# Patient Record
Sex: FEMALE | Race: WHITE | Employment: OTHER | URBAN - METROPOLITAN AREA
[De-identification: names, ages, dates, MRNs, and addresses within clinical notes are randomized per-mention and may not be internally consistent; named-entity substitution may affect disease eponyms.]

---

## 2018-03-02 ENCOUNTER — HOSPITAL ENCOUNTER (OUTPATIENT)
Dept: CT IMAGING | Age: 73
Discharge: HOME | End: 2018-03-02
Attending: INTERNAL MEDICINE
Payer: MEDICARE

## 2018-03-02 DIAGNOSIS — R19.05 PERIUMBILICAL MASS: ICD-10-CM

## 2018-03-02 PROCEDURE — 74177 CT ABD & PELVIS W/CONTRAST: CPT

## 2018-03-02 PROCEDURE — 74011636320 HC RX REV CODE- 636/320

## 2018-03-02 PROCEDURE — 74011000258 HC RX REV CODE- 258

## 2018-03-02 RX ORDER — SODIUM CHLORIDE 0.9 % (FLUSH) 0.9 %
10 SYRINGE (ML) INJECTION
Status: COMPLETED | OUTPATIENT
Start: 2018-03-02 | End: 2018-03-02

## 2018-03-02 RX ADMIN — Medication 10 ML: at 11:25

## 2018-03-02 RX ADMIN — IOPAMIDOL 100 ML: 755 INJECTION, SOLUTION INTRAVENOUS at 11:25

## 2018-03-02 RX ADMIN — DIATRIZOATE MEGLUMINE AND DIATRIZOATE SODIUM 15 ML: 660; 100 LIQUID ORAL; RECTAL at 11:25

## 2018-03-02 RX ADMIN — SODIUM CHLORIDE 100 ML: 900 INJECTION, SOLUTION INTRAVENOUS at 11:25

## 2018-03-08 PROBLEM — E66.01 OBESITY, MORBID (HCC): Status: ACTIVE | Noted: 2018-03-08

## 2018-03-09 ENCOUNTER — HOSPITAL ENCOUNTER (EMERGENCY)
Age: 73
Discharge: HOME | End: 2018-03-09
Attending: EMERGENCY MEDICINE
Payer: MEDICARE

## 2018-03-09 VITALS
RESPIRATION RATE: 18 BRPM | DIASTOLIC BLOOD PRESSURE: 82 MMHG | OXYGEN SATURATION: 98 % | BODY MASS INDEX: 45.64 KG/M2 | SYSTOLIC BLOOD PRESSURE: 148 MMHG | HEART RATE: 91 BPM | HEIGHT: 66 IN | TEMPERATURE: 98.9 F | WEIGHT: 284 LBS

## 2018-03-09 DIAGNOSIS — R10.9 ACUTE ABDOMINAL PAIN: Primary | ICD-10-CM

## 2018-03-09 DIAGNOSIS — K43.2 INCISIONAL HERNIA, WITHOUT OBSTRUCTION OR GANGRENE: ICD-10-CM

## 2018-03-09 DIAGNOSIS — K59.00 CONSTIPATION, UNSPECIFIED CONSTIPATION TYPE: ICD-10-CM

## 2018-03-09 LAB
BACTERIA URNS QL MICRO: NORMAL /HPF
CASTS URNS QL MICRO: 0 /LPF
EPI CELLS #/AREA URNS HPF: NORMAL /HPF
RBC #/AREA URNS HPF: 0 /HPF
WBC URNS QL MICRO: NORMAL /HPF

## 2018-03-09 PROCEDURE — 87086 URINE CULTURE/COLONY COUNT: CPT | Performed by: EMERGENCY MEDICINE

## 2018-03-09 PROCEDURE — 81015 MICROSCOPIC EXAM OF URINE: CPT | Performed by: EMERGENCY MEDICINE

## 2018-03-09 PROCEDURE — 87186 SC STD MICRODIL/AGAR DIL: CPT | Performed by: EMERGENCY MEDICINE

## 2018-03-09 PROCEDURE — 99282 EMERGENCY DEPT VISIT SF MDM: CPT | Performed by: EMERGENCY MEDICINE

## 2018-03-09 PROCEDURE — 87088 URINE BACTERIA CULTURE: CPT | Performed by: EMERGENCY MEDICINE

## 2018-03-09 NOTE — ED TRIAGE NOTES
Pt presents to ER for known ventral hernia that is having mild constipation and poss hemorrhoid stating that she feels like \"something feels like it's gonna fall out back there\".

## 2018-03-09 NOTE — ED NOTES
I have reviewed discharge instructions with the patient. The patient verbalized understanding. Patient left ED via Discharge Method: ambulatory to Home with (daughter). Opportunity for questions and clarification provided. Patient given 0 scripts. To continue your aftercare when you leave the hospital, you may receive an automated call from our care team to check in on how you are doing. This is a free service and part of our promise to provide the best care and service to meet your aftercare needs.  If you have questions, or wish to unsubscribe from this service please call [phone]. Thank you for Choosing our [facility] Emergency Department.

## 2018-03-09 NOTE — ED PROVIDER NOTES
HPI Comments: 43-year-old females has troubles with upper umbilical pain for a while. She had a CT scan showing a umbilical hernia. The series remains sore. Tonight about 9 PM she noticed nausea along with some slight increased soreness in her abdomen. She also has some low back pain  And a feeling of fullness in her rectum. No vomiting or diarrhea. No bleeding no change in urine. Patient is a 71 y.o. female presenting with rectal pain and nausea. The history is provided by the patient. Anal Pain    The current episode started today. The problem occurs rarely. The problem has been unchanged. The pain is mild. The stool is described as hard. Associated symptoms include abdominal pain, nausea and rectal pain. Pertinent negatives include no fever, no diarrhea, no hemorrhoids, no vomiting, no chest pain, no headaches, no coughing and no rash. There were no sick contacts. Nausea    Associated symptoms include abdominal pain. Pertinent negatives include no chills, no fever, no diarrhea, no headaches, no cough and no headaches.         Past Medical History:   Diagnosis Date    Arthritis     osteo    Cataracts, bilateral     Chronic pain     right shoulder x 2 years    Edema 2/26/2013    GERD (gastroesophageal reflux disease)     controlled with medication    Gout 2/26/2013    Hyperglycemia 8/31/2015    Hyperlipidemia 2/26/2013    Hypertension     controlled with meds    IBS (irritable bowel syndrome)     Ill-defined condition     hgba1c was 5.4 on 03/2016    Morbid obesity ([address]     bmi=47.2    Other ill-defined conditions(799.89)     back problems    Prediabetes 6/22/2016    Sciatica     right worse than left    Ulcer of ankle ([address] 2/26/2013    Vitamin D deficiency 2/19/2015       Past Surgical History:   Procedure Laterality Date    HX APPENDECTOMY  1954    HX CATARACT REMOVAL Bilateral     2014 - Dr. Nila Coy Bilateral 2016    Dr. Frank Candelario 1964    right inguinal hernia    HX HERNIA REPAIR  1993, 8183, 7679    umbilical hernia repair    HX HYSTERECTOMY  1986    HX LAP CHOLECYSTECTOMY  1994    HX MOHS PROCEDURES  2011    left    HX ORTHOPAEDIC  1982  2002    right elbow surgery    HX SHOULDER REPLACEMENT Right     HX TONSILLECTOMY      TOTAL KNEE ARTHROPLASTY  2005    right    TOTAL KNEE ARTHROPLASTY  2012    left         Family History:   Problem Relation Age of Onset   [facility] Cancer Mother      colon    Hypertension Mother     Stroke Mother     Heart Disease Mother      CHF and a Pacemaker    Hypertension Father     Cancer Father      prostate and colon    Cancer Maternal Grandmother      colon    Breast Cancer Neg Hx        Social History     Social History    Marital status:      Spouse name: N/A    Number of children: N/A    Years of education: N/A     Occupational History    Not on file. Social History Main Topics    Smoking status: Never Smoker    Smokeless tobacco: Never Used    Alcohol use No    Drug use: No    Sexual activity: Not Currently     Other Topics Concern    Not on file     Social History Narrative         ALLERGIES: Adhesive; Clindamycin; and Keflex [cephalexin]    Review of Systems   Constitutional: Negative for chills and fever. Respiratory: Negative for cough and shortness of breath. Cardiovascular: Negative for chest pain and palpitations. Gastrointestinal: Positive for abdominal pain, nausea and rectal pain. Negative for diarrhea, hemorrhoids and vomiting. Genitourinary: Negative for dysuria and flank pain. Musculoskeletal: Negative for back pain and neck pain. Skin: Negative for color change and rash. Neurological: Negative for syncope and headaches. All other systems reviewed and are negative. There were no vitals filed for this visit. Physical Exam   Constitutional: She is oriented to person, place, and time. She appears well-developed and well-nourished.  No distress. HENT:   Head: Normocephalic and atraumatic. Mouth/Throat: Oropharynx is clear and moist. No oropharyngeal exudate. Eyes: Conjunctivae and EOM are normal. Pupils are equal, round, and reactive to light. Neck: Normal range of motion. Neck supple. Cardiovascular: Normal rate, regular rhythm and intact distal pulses. No murmur heard. Pulmonary/Chest: Breath sounds normal. No respiratory distress. Abdominal: Soft. Bowel sounds are normal. She exhibits no mass. There is no tenderness. There is no rebound and no guarding. A hernia is present. Genitourinary: Rectal exam shows no external hemorrhoid, no fissure, no mass, no tenderness and guaiac negative stool. Genitourinary Comments: No impaction or blood or prolapse   Neurological: She is alert and oriented to person, place, and time. Gait normal.   Nl speech   Skin: Skin is warm and dry. Psychiatric: She has a normal mood and affect. Her speech is normal.   Nursing note and vitals reviewed. MDM  Number of Diagnoses or Management Options        ED Course       Procedures    Results Include:    Recent Results (from the past 24 hour(s))   URINE MICROSCOPIC    Collection Time: 03/09/18  5:08 AM   Result Value Ref Range    WBC 5-10 0 /hpf    RBC 0 0 /hpf    Epithelial cells 0-3 0 /hpf    Bacteria TRACE 0 /hpf    Casts 0 0 /lpf        No obvious UTI. We'll culture urine. Do not think ventral hernia is causing any new symptoms. No evidence for incarceration or strangulation. No obvious rectal prolapse or bleeding hemorrhoids. Patient had 2 small hard bowel movements earlier today.

## 2018-03-09 NOTE — DISCHARGE INSTRUCTIONS
Milk of magnesia to get your bowels to move. Consider MiraLAX. Keep appointment with his surgeon next week. Recheck sooner for worse pain fever or vomiting. Abdominal Pain: Care Instructions  Your Care Instructions    Abdominal pain has many possible causes. Some aren't serious and get better on their own in a few days. Others need more testing and treatment. If your pain continues or gets worse, you need to be rechecked and may need more tests to find out what is wrong. You may need surgery to correct the problem. Don't ignore new symptoms, such as fever, nausea and vomiting, urination problems, pain that gets worse, and dizziness. These may be signs of a more serious problem. Your doctor may have recommended a follow-up visit in the next 8 to 12 hours. If you are not getting better, you may need more tests or treatment. The doctor has checked you carefully, but problems can develop later. If you notice any problems or new symptoms, get medical treatment right away. Follow-up care is a key part of your treatment and safety. Be sure to make and go to all appointments, and call your doctor if you are having problems. It's also a good idea to know your test results and keep a list of the medicines you take. How can you care for yourself at home? · Rest until you feel better. · To prevent dehydration, drink plenty of fluids, enough so that your urine is light yellow or clear like water. Choose water and other caffeine-free clear liquids until you feel better. If you have kidney, heart, or liver disease and have to limit fluids, talk with your doctor before you increase the amount of fluids you drink. · If your stomach is upset, eat mild foods, such as rice, dry toast or crackers, bananas, and applesauce. Try eating several small meals instead of two or three large ones.   · Wait until 48 hours after all symptoms have gone away before you have spicy foods, alcohol, and drinks that contain caffeine. · Do not eat foods that are high in fat. · Avoid anti-inflammatory medicines such as aspirin, ibuprofen (Advil, Motrin), and naproxen (Aleve). These can cause stomach upset. Talk to your doctor if you take daily aspirin for another health problem. When should you call for help? Call 911 anytime you think you may need emergency care. For example, call if:  ? · You passed out (lost consciousness). ? · You pass maroon or very bloody stools. ? · You vomit blood or what looks like coffee grounds. ? · You have new, severe belly pain. ?Call your doctor now or seek immediate medical care if:  ? · Your pain gets worse, especially if it becomes focused in one area of your belly. ? · You have a new or higher fever. ? · Your stools are black and look like tar, or they have streaks of blood. ? · You have unexpected vaginal bleeding. ? · You have symptoms of a urinary tract infection. These may include:  ¨ Pain when you urinate. ¨ Urinating more often than usual.  ¨ Blood in your urine. ? · You are dizzy or lightheaded, or you feel like you may faint. ? Watch closely for changes in your health, and be sure to contact your doctor if:  ? · You are not getting better after 1 day (24 hours). Where can you learn more? Go to http://dana-justo.info/. Enter F664 in the search box to learn more about \"Abdominal Pain: Care Instructions. \"  Current as of: March 20, 2017  Content Version: 11.4  © 8526-0301 [employer]. Care instructions adapted under license by [employer] (which disclaims liability or warranty for this information). If you have questions about a medical condition or this instruction, always ask your healthcare professional. [address] any warranty or liability for your use of this information.

## 2018-03-10 RX ORDER — NITROFURANTOIN 25; 75 MG/1; MG/1
100 CAPSULE ORAL 2 TIMES DAILY
Qty: 20 CAP | Refills: 0 | Status: SHIPPED | OUTPATIENT
Start: 2018-03-10 | End: 2018-03-20

## 2018-03-10 RX ORDER — FLUCONAZOLE 150 MG/1
150 TABLET ORAL
Qty: 2 TAB | Refills: 0 | Status: SHIPPED | OUTPATIENT
Start: 2018-03-10 | End: 2018-03-10

## 2018-03-10 NOTE — PROGRESS NOTES
Patient returned call, she provided  and last four digits of SS# for ID. She uses [facility] at [facility], I will e-scribe macrobid and diflucan as patient states \"I usually get a yeast infection on antibiotics. \" Patient is not having any new symptoms.

## 2018-03-10 NOTE — PROGRESS NOTES
Patient not placed on antibiotics. Called and left a VM for patient to return call. She will need some antibiotics.

## 2018-03-12 LAB
BACTERIA SPEC CULT: ABNORMAL
SERVICE CMNT-IMP: ABNORMAL

## 2018-03-22 ENCOUNTER — HOSPITAL ENCOUNTER (OUTPATIENT)
Dept: SURGERY | Age: 73
Discharge: HOME | End: 2018-03-22

## 2018-03-22 VITALS
HEIGHT: 65 IN | OXYGEN SATURATION: 99 % | DIASTOLIC BLOOD PRESSURE: 73 MMHG | SYSTOLIC BLOOD PRESSURE: 166 MMHG | RESPIRATION RATE: 20 BRPM | TEMPERATURE: 98.1 F | HEART RATE: 66 BPM | WEIGHT: 287 LBS | BODY MASS INDEX: 47.82 KG/M2

## 2018-03-22 RX ORDER — NAPROXEN SODIUM 220 MG
220 TABLET ORAL
COMMUNITY
End: 2018-10-16 | Stop reason: ALTCHOICE

## 2018-03-22 NOTE — PERIOP NOTES
Type 2 surgery,  assessment complete. Labs per surgeon: none  Labs per anesthesia protocol: hgb, potassium and creatinine- results 3/1/18 reviewed and approved  EKG: not required  ECHO- Pt states echo preformed at [facility] yesterday 3/21/18 for feeling SOB while lying flat only. Pt denies SOB on exertion. S1S2, no audible murmur. Spoke with Raysa at [facility] and she states that results should be available tomorrow. Chart marked for charge nurse to obtain and review results. Hibiclens and instructions given per hospital policy. Patient provided with and instructed on educational handouts including Guide to Surgery, Pain Management, Hand Hygiene, Blood Transfusion Education, and [facility] Brochure. Patient answered medical/surgical history questions at their best of ability. All prior to admission medications documented in [facility]. Patient instructed to hold all vitamins 7 days prior to surgery and NSAIDS 5 days prior to surgery, patient verbalized understanding. Medications to be held: none, however Dr Karyle Belton has advised pt to hold aspirin 81 mg for surgery. Pt denies hx of CAD, stents, stroke,arrhythmia, or DVT. Patient instructed to continue previous medications as prescribed prior to surgery and to take the following medications the day of surgery according to anesthesia guidelines with a small sip of water: allopurinol and omeprazole. Patient instructed on the following:  Arrive at MAIN Entrance, time of arrival to be called the day before by 1700  NPO after midnight including gum, mints, and ice chips. Responsible adult must drive patient to the hospital, stay during surgery, and patient will require supervision 24 hours after anesthesia. Use hibiclens in shower the night before surgery and on the morning of surgery. Leave all valuables (money and jewelry) at home but bring insurance card and ID on DOS.   Do not wear make-up, nail polish, lotions, cologne, perfumes, powders, or oil on skin. Patient teach back successful and patient demonstrates knowledge of instruction.

## 2018-03-23 NOTE — PERIOP NOTES
Spoke with Teddy López at [address] [place] [address] [address] [facility] regarding echo results from yesterday. Echo results will be read tonight. Will call Monday to follow-up.

## 2018-03-27 NOTE — PERIOP NOTES
Notes Recorded by Albania Engle MD on 3/23/2018 at 3:15 PM  Your echo looks good!  No sign of reduced heart function or abnormal lung pressures to cause the shortness of breath.  I'm wondering whether weight contributes and would like you to try sleeping propped up routinely, for ease of breathing    Reviewed

## 2018-03-29 ENCOUNTER — ANESTHESIA (OUTPATIENT)
Dept: SURGERY | Age: 73
DRG: 354 | End: 2018-03-29
Payer: MEDICARE

## 2018-03-29 ENCOUNTER — ANESTHESIA EVENT (OUTPATIENT)
Dept: SURGERY | Age: 73
DRG: 354 | End: 2018-03-29
Payer: MEDICARE

## 2018-03-29 ENCOUNTER — HOSPITAL ENCOUNTER (INPATIENT)
Age: 73
LOS: 2 days | Discharge: HOME | DRG: 354 | End: 2018-03-31
Attending: SURGERY | Admitting: SURGERY
Payer: MEDICARE

## 2018-03-29 DIAGNOSIS — K43.2 INCISIONAL HERNIA WITHOUT OBSTRUCTION OR GANGRENE: Primary | ICD-10-CM

## 2018-03-29 PROBLEM — K43.0 RECURRENT INCISIONAL HERNIA WITH INCARCERATION: Status: ACTIVE | Noted: 2018-03-29

## 2018-03-29 LAB
GLUCOSE BLD STRIP.AUTO-MCNC: 127 MG/DL (ref 65–100)
POTASSIUM BLD-SCNC: 4 MMOL/L (ref 3.5–5.1)

## 2018-03-29 PROCEDURE — 0WPF0JZ REMOVAL OF SYNTHETIC SUBSTITUTE FROM ABDOMINAL WALL, OPEN APPROACH: ICD-10-PCS | Performed by: SURGERY

## 2018-03-29 PROCEDURE — 84132 ASSAY OF SERUM POTASSIUM: CPT

## 2018-03-29 PROCEDURE — 77030008703 HC TU ET UNCUF COVD -A: Performed by: ANESTHESIOLOGY

## 2018-03-29 PROCEDURE — 77030012411 HC DRN WND CARD -A: Performed by: SURGERY

## 2018-03-29 PROCEDURE — 74011250636 HC RX REV CODE- 250/636

## 2018-03-29 PROCEDURE — 65270000029 HC RM PRIVATE

## 2018-03-29 PROCEDURE — 76010000162 HC OR TIME 1.5 TO 2 HR INTENSV-TIER 1: Performed by: SURGERY

## 2018-03-29 PROCEDURE — 77030018836 HC SOL IRR NACL ICUM -A: Performed by: SURGERY

## 2018-03-29 PROCEDURE — 77030032490 HC SLV COMPR SCD KNE COVD -B: Performed by: SURGERY

## 2018-03-29 PROCEDURE — 77030008477 HC STYL SATN SLP COVD -A: Performed by: ANESTHESIOLOGY

## 2018-03-29 PROCEDURE — 74011000250 HC RX REV CODE- 250

## 2018-03-29 PROCEDURE — 77030013567 HC DRN WND RESERV BARD -A: Performed by: SURGERY

## 2018-03-29 PROCEDURE — C1713 ANCHOR/SCREW BN/BN,TIS/BN: HCPCS | Performed by: SURGERY

## 2018-03-29 PROCEDURE — 74011250636 HC RX REV CODE- 250/636: Performed by: SURGERY

## 2018-03-29 PROCEDURE — 77030012406 HC DRN WND PENRS BARD -A: Performed by: SURGERY

## 2018-03-29 PROCEDURE — 74011250637 HC RX REV CODE- 250/637: Performed by: SURGERY

## 2018-03-29 PROCEDURE — 77030005326 HC CATH PAIN PMP/Q AVNM -C: Performed by: SURGERY

## 2018-03-29 PROCEDURE — 77030020782 HC GWN BAIR PAWS FLX 3M -B: Performed by: ANESTHESIOLOGY

## 2018-03-29 PROCEDURE — 82962 GLUCOSE BLOOD TEST: CPT

## 2018-03-29 PROCEDURE — 74011250636 HC RX REV CODE- 250/636: Performed by: ANESTHESIOLOGY

## 2018-03-29 PROCEDURE — 76210000006 HC OR PH I REC 0.5 TO 1 HR: Performed by: SURGERY

## 2018-03-29 PROCEDURE — 74011000250 HC RX REV CODE- 250: Performed by: SURGERY

## 2018-03-29 PROCEDURE — 0JH80VZ INSERTION OF INFUSION PUMP INTO ABDOMEN SUBCUTANEOUS TISSUE AND FASCIA, OPEN APPROACH: ICD-10-PCS | Performed by: SURGERY

## 2018-03-29 PROCEDURE — 77030011640 HC PAD GRND REM COVD -A: Performed by: SURGERY

## 2018-03-29 PROCEDURE — 77030008467 HC STPLR SKN COVD -B: Performed by: SURGERY

## 2018-03-29 PROCEDURE — 76060000034 HC ANESTHESIA 1.5 TO 2 HR: Performed by: SURGERY

## 2018-03-29 PROCEDURE — 77030002986 HC SUT PROL J&J -A: Performed by: SURGERY

## 2018-03-29 PROCEDURE — 0WUF0JZ SUPPLEMENT ABDOMINAL WALL WITH SYNTHETIC SUBSTITUTE, OPEN APPROACH: ICD-10-PCS | Performed by: SURGERY

## 2018-03-29 PROCEDURE — C1781 MESH (IMPLANTABLE): HCPCS | Performed by: SURGERY

## 2018-03-29 PROCEDURE — 77030002996 HC SUT SLK J&J -A: Performed by: SURGERY

## 2018-03-29 PROCEDURE — 77030002916 HC SUT ETHLN J&J -A: Performed by: SURGERY

## 2018-03-29 DEVICE — MESH HERN L W5.4XL7IN POLYPR EPTFE OVL SELF EXP PTCH FOR: Type: IMPLANTABLE DEVICE | Site: ABDOMEN | Status: FUNCTIONAL

## 2018-03-29 RX ORDER — DEXAMETHASONE SODIUM PHOSPHATE 4 MG/ML
INJECTION, SOLUTION INTRA-ARTICULAR; INTRALESIONAL; INTRAMUSCULAR; INTRAVENOUS; SOFT TISSUE AS NEEDED
Status: DISCONTINUED | OUTPATIENT
Start: 2018-03-29 | End: 2018-03-29 | Stop reason: HOSPADM

## 2018-03-29 RX ORDER — NALOXONE HYDROCHLORIDE 0.4 MG/ML
0.1 INJECTION, SOLUTION INTRAMUSCULAR; INTRAVENOUS; SUBCUTANEOUS AS NEEDED
Status: DISCONTINUED | OUTPATIENT
Start: 2018-03-29 | End: 2018-03-29 | Stop reason: HOSPADM

## 2018-03-29 RX ORDER — FENTANYL CITRATE 50 UG/ML
100 INJECTION, SOLUTION INTRAMUSCULAR; INTRAVENOUS AS NEEDED
Status: DISCONTINUED | OUTPATIENT
Start: 2018-03-29 | End: 2018-03-29 | Stop reason: HOSPADM

## 2018-03-29 RX ORDER — ENOXAPARIN SODIUM 100 MG/ML
40 INJECTION SUBCUTANEOUS EVERY 24 HOURS
Status: DISCONTINUED | OUTPATIENT
Start: 2018-03-29 | End: 2018-03-31 | Stop reason: HOSPADM

## 2018-03-29 RX ORDER — ACETAMINOPHEN 500 MG
500 TABLET ORAL ONCE
Status: DISCONTINUED | OUTPATIENT
Start: 2018-03-29 | End: 2018-03-29 | Stop reason: HOSPADM

## 2018-03-29 RX ORDER — SODIUM CHLORIDE 0.9 % (FLUSH) 0.9 %
5-10 SYRINGE (ML) INJECTION EVERY 8 HOURS
Status: DISCONTINUED | OUTPATIENT
Start: 2018-03-29 | End: 2018-03-29 | Stop reason: HOSPADM

## 2018-03-29 RX ORDER — ONDANSETRON 2 MG/ML
4 INJECTION INTRAMUSCULAR; INTRAVENOUS
Status: DISCONTINUED | OUTPATIENT
Start: 2018-03-29 | End: 2018-03-31 | Stop reason: HOSPADM

## 2018-03-29 RX ORDER — SODIUM CHLORIDE, SODIUM LACTATE, POTASSIUM CHLORIDE, CALCIUM CHLORIDE 600; 310; 30; 20 MG/100ML; MG/100ML; MG/100ML; MG/100ML
1000 INJECTION, SOLUTION INTRAVENOUS CONTINUOUS
Status: DISCONTINUED | OUTPATIENT
Start: 2018-03-29 | End: 2018-03-29 | Stop reason: HOSPADM

## 2018-03-29 RX ORDER — HYDROMORPHONE HYDROCHLORIDE 2 MG/ML
.5-2 INJECTION, SOLUTION INTRAMUSCULAR; INTRAVENOUS; SUBCUTANEOUS
Status: DISCONTINUED | OUTPATIENT
Start: 2018-03-29 | End: 2018-03-31 | Stop reason: HOSPADM

## 2018-03-29 RX ORDER — DIPHENHYDRAMINE HYDROCHLORIDE 50 MG/ML
12.5 INJECTION, SOLUTION INTRAMUSCULAR; INTRAVENOUS ONCE
Status: DISCONTINUED | OUTPATIENT
Start: 2018-03-29 | End: 2018-03-29 | Stop reason: HOSPADM

## 2018-03-29 RX ORDER — SODIUM CHLORIDE 0.9 % (FLUSH) 0.9 %
5-10 SYRINGE (ML) INJECTION AS NEEDED
Status: DISCONTINUED | OUTPATIENT
Start: 2018-03-29 | End: 2018-03-29 | Stop reason: HOSPADM

## 2018-03-29 RX ORDER — LIDOCAINE HYDROCHLORIDE 20 MG/ML
INJECTION, SOLUTION EPIDURAL; INFILTRATION; INTRACAUDAL; PERINEURAL AS NEEDED
Status: DISCONTINUED | OUTPATIENT
Start: 2018-03-29 | End: 2018-03-29 | Stop reason: HOSPADM

## 2018-03-29 RX ORDER — OXYCODONE HYDROCHLORIDE 5 MG/1
5-15 TABLET ORAL
Status: DISCONTINUED | OUTPATIENT
Start: 2018-03-29 | End: 2018-03-31 | Stop reason: HOSPADM

## 2018-03-29 RX ORDER — LIDOCAINE HYDROCHLORIDE 10 MG/ML
0.1 INJECTION INFILTRATION; PERINEURAL AS NEEDED
Status: DISCONTINUED | OUTPATIENT
Start: 2018-03-29 | End: 2018-03-29 | Stop reason: HOSPADM

## 2018-03-29 RX ORDER — HYDROCHLOROTHIAZIDE 12.5 MG/1
12.5 CAPSULE ORAL DAILY
Status: DISCONTINUED | OUTPATIENT
Start: 2018-03-30 | End: 2018-03-31 | Stop reason: HOSPADM

## 2018-03-29 RX ORDER — NALOXONE HYDROCHLORIDE 0.4 MG/ML
0.1 INJECTION, SOLUTION INTRAMUSCULAR; INTRAVENOUS; SUBCUTANEOUS AS NEEDED
Status: DISCONTINUED | OUTPATIENT
Start: 2018-03-29 | End: 2018-03-31 | Stop reason: HOSPADM

## 2018-03-29 RX ORDER — OXYCODONE HYDROCHLORIDE 5 MG/1
10 TABLET ORAL
Status: DISCONTINUED | OUTPATIENT
Start: 2018-03-29 | End: 2018-03-29 | Stop reason: HOSPADM

## 2018-03-29 RX ORDER — ROCURONIUM BROMIDE 10 MG/ML
INJECTION, SOLUTION INTRAVENOUS AS NEEDED
Status: DISCONTINUED | OUTPATIENT
Start: 2018-03-29 | End: 2018-03-29 | Stop reason: HOSPADM

## 2018-03-29 RX ORDER — ONDANSETRON 2 MG/ML
INJECTION INTRAMUSCULAR; INTRAVENOUS AS NEEDED
Status: DISCONTINUED | OUTPATIENT
Start: 2018-03-29 | End: 2018-03-29 | Stop reason: HOSPADM

## 2018-03-29 RX ORDER — BUPIVACAINE HYDROCHLORIDE 2.5 MG/ML
INJECTION, SOLUTION EPIDURAL; INFILTRATION; INTRACAUDAL AS NEEDED
Status: DISCONTINUED | OUTPATIENT
Start: 2018-03-29 | End: 2018-03-29 | Stop reason: HOSPADM

## 2018-03-29 RX ORDER — LISINOPRIL 20 MG/1
40 TABLET ORAL DAILY
Status: DISCONTINUED | OUTPATIENT
Start: 2018-03-30 | End: 2018-03-31 | Stop reason: HOSPADM

## 2018-03-29 RX ORDER — MIDAZOLAM HYDROCHLORIDE 1 MG/ML
2 INJECTION, SOLUTION INTRAMUSCULAR; INTRAVENOUS
Status: DISCONTINUED | OUTPATIENT
Start: 2018-03-29 | End: 2018-03-29 | Stop reason: HOSPADM

## 2018-03-29 RX ORDER — SODIUM CHLORIDE 0.9 % (FLUSH) 0.9 %
5-10 SYRINGE (ML) INJECTION AS NEEDED
Status: DISCONTINUED | OUTPATIENT
Start: 2018-03-29 | End: 2018-03-31 | Stop reason: HOSPADM

## 2018-03-29 RX ORDER — ONDANSETRON 2 MG/ML
4 INJECTION INTRAMUSCULAR; INTRAVENOUS ONCE
Status: DISCONTINUED | OUTPATIENT
Start: 2018-03-29 | End: 2018-03-29 | Stop reason: HOSPADM

## 2018-03-29 RX ORDER — ASPIRIN 81 MG/1
81 TABLET ORAL DAILY
Status: DISCONTINUED | OUTPATIENT
Start: 2018-03-30 | End: 2018-03-31 | Stop reason: HOSPADM

## 2018-03-29 RX ORDER — NEOSTIGMINE METHYLSULFATE 1 MG/ML
INJECTION INTRAVENOUS AS NEEDED
Status: DISCONTINUED | OUTPATIENT
Start: 2018-03-29 | End: 2018-03-29 | Stop reason: HOSPADM

## 2018-03-29 RX ORDER — HYDROMORPHONE HYDROCHLORIDE 2 MG/ML
0.5 INJECTION, SOLUTION INTRAMUSCULAR; INTRAVENOUS; SUBCUTANEOUS
Status: DISCONTINUED | OUTPATIENT
Start: 2018-03-29 | End: 2018-03-29 | Stop reason: HOSPADM

## 2018-03-29 RX ORDER — METFORMIN HYDROCHLORIDE 500 MG/1
500 TABLET ORAL 2 TIMES DAILY WITH MEALS
Status: DISCONTINUED | OUTPATIENT
Start: 2018-03-29 | End: 2018-03-30

## 2018-03-29 RX ORDER — FENTANYL CITRATE 50 UG/ML
INJECTION, SOLUTION INTRAMUSCULAR; INTRAVENOUS AS NEEDED
Status: DISCONTINUED | OUTPATIENT
Start: 2018-03-29 | End: 2018-03-29 | Stop reason: HOSPADM

## 2018-03-29 RX ORDER — ALLOPURINOL 300 MG/1
300 TABLET ORAL DAILY
Status: DISCONTINUED | OUTPATIENT
Start: 2018-03-30 | End: 2018-03-31 | Stop reason: HOSPADM

## 2018-03-29 RX ORDER — CIPROFLOXACIN 2 MG/ML
400 INJECTION, SOLUTION INTRAVENOUS
Status: COMPLETED | OUTPATIENT
Start: 2018-03-29 | End: 2018-03-29

## 2018-03-29 RX ORDER — PROPOFOL 10 MG/ML
INJECTION, EMULSION INTRAVENOUS AS NEEDED
Status: DISCONTINUED | OUTPATIENT
Start: 2018-03-29 | End: 2018-03-29 | Stop reason: HOSPADM

## 2018-03-29 RX ORDER — SODIUM CHLORIDE 0.9 % (FLUSH) 0.9 %
5-10 SYRINGE (ML) INJECTION EVERY 8 HOURS
Status: DISCONTINUED | OUTPATIENT
Start: 2018-03-29 | End: 2018-03-31 | Stop reason: HOSPADM

## 2018-03-29 RX ORDER — SODIUM CHLORIDE AND POTASSIUM CHLORIDE .9; .15 G/100ML; G/100ML
SOLUTION INTRAVENOUS CONTINUOUS
Status: DISCONTINUED | OUTPATIENT
Start: 2018-03-29 | End: 2018-03-31 | Stop reason: HOSPADM

## 2018-03-29 RX ORDER — OXYCODONE HYDROCHLORIDE 5 MG/1
5 TABLET ORAL
Status: DISCONTINUED | OUTPATIENT
Start: 2018-03-29 | End: 2018-03-29 | Stop reason: HOSPADM

## 2018-03-29 RX ORDER — SODIUM CHLORIDE, SODIUM LACTATE, POTASSIUM CHLORIDE, CALCIUM CHLORIDE 600; 310; 30; 20 MG/100ML; MG/100ML; MG/100ML; MG/100ML
125 INJECTION, SOLUTION INTRAVENOUS CONTINUOUS
Status: DISCONTINUED | OUTPATIENT
Start: 2018-03-29 | End: 2018-03-29

## 2018-03-29 RX ORDER — SODIUM CHLORIDE, SODIUM LACTATE, POTASSIUM CHLORIDE, CALCIUM CHLORIDE 600; 310; 30; 20 MG/100ML; MG/100ML; MG/100ML; MG/100ML
75 INJECTION, SOLUTION INTRAVENOUS CONTINUOUS
Status: DISCONTINUED | OUTPATIENT
Start: 2018-03-29 | End: 2018-03-29 | Stop reason: HOSPADM

## 2018-03-29 RX ORDER — DIPHENHYDRAMINE HYDROCHLORIDE 50 MG/ML
12.5 INJECTION, SOLUTION INTRAMUSCULAR; INTRAVENOUS
Status: DISCONTINUED | OUTPATIENT
Start: 2018-03-29 | End: 2018-03-31 | Stop reason: HOSPADM

## 2018-03-29 RX ORDER — GLYCOPYRROLATE 0.2 MG/ML
INJECTION INTRAMUSCULAR; INTRAVENOUS AS NEEDED
Status: DISCONTINUED | OUTPATIENT
Start: 2018-03-29 | End: 2018-03-29 | Stop reason: HOSPADM

## 2018-03-29 RX ADMIN — ROCURONIUM BROMIDE 10 MG: 10 INJECTION, SOLUTION INTRAVENOUS at 14:31

## 2018-03-29 RX ADMIN — PROPOFOL 200 MG: 10 INJECTION, EMULSION INTRAVENOUS at 13:50

## 2018-03-29 RX ADMIN — ONDANSETRON 4 MG: 2 INJECTION INTRAMUSCULAR; INTRAVENOUS at 14:47

## 2018-03-29 RX ADMIN — FENTANYL CITRATE 25 MCG: 50 INJECTION, SOLUTION INTRAMUSCULAR; INTRAVENOUS at 14:08

## 2018-03-29 RX ADMIN — SODIUM CHLORIDE AND POTASSIUM CHLORIDE: 9; 1.49 INJECTION, SOLUTION INTRAVENOUS at 18:26

## 2018-03-29 RX ADMIN — GLYCOPYRROLATE 0.3 MG: 0.2 INJECTION INTRAMUSCULAR; INTRAVENOUS at 15:05

## 2018-03-29 RX ADMIN — HYDROMORPHONE HYDROCHLORIDE 0.5 MG: 2 INJECTION, SOLUTION INTRAMUSCULAR; INTRAVENOUS; SUBCUTANEOUS at 15:50

## 2018-03-29 RX ADMIN — ENOXAPARIN SODIUM 40 MG: 40 INJECTION SUBCUTANEOUS at 20:34

## 2018-03-29 RX ADMIN — OXYCODONE HYDROCHLORIDE 5 MG: 5 TABLET ORAL at 20:09

## 2018-03-29 RX ADMIN — LIDOCAINE HYDROCHLORIDE 40 MG: 20 INJECTION, SOLUTION EPIDURAL; INFILTRATION; INTRACAUDAL; PERINEURAL at 13:50

## 2018-03-29 RX ADMIN — SODIUM CHLORIDE, SODIUM LACTATE, POTASSIUM CHLORIDE, AND CALCIUM CHLORIDE 1000 ML: 600; 310; 30; 20 INJECTION, SOLUTION INTRAVENOUS at 12:07

## 2018-03-29 RX ADMIN — FENTANYL CITRATE 50 MCG: 50 INJECTION, SOLUTION INTRAMUSCULAR; INTRAVENOUS at 13:50

## 2018-03-29 RX ADMIN — DEXAMETHASONE SODIUM PHOSPHATE 4 MG: 4 INJECTION, SOLUTION INTRA-ARTICULAR; INTRALESIONAL; INTRAMUSCULAR; INTRAVENOUS; SOFT TISSUE at 14:00

## 2018-03-29 RX ADMIN — FENTANYL CITRATE 25 MCG: 50 INJECTION, SOLUTION INTRAMUSCULAR; INTRAVENOUS at 15:07

## 2018-03-29 RX ADMIN — ROCURONIUM BROMIDE 10 MG: 10 INJECTION, SOLUTION INTRAVENOUS at 14:19

## 2018-03-29 RX ADMIN — ROCURONIUM BROMIDE 5 MG: 10 INJECTION, SOLUTION INTRAVENOUS at 14:46

## 2018-03-29 RX ADMIN — ROCURONIUM BROMIDE 40 MG: 10 INJECTION, SOLUTION INTRAVENOUS at 13:50

## 2018-03-29 RX ADMIN — SODIUM CHLORIDE, SODIUM LACTATE, POTASSIUM CHLORIDE, AND CALCIUM CHLORIDE: 600; 310; 30; 20 INJECTION, SOLUTION INTRAVENOUS at 15:16

## 2018-03-29 RX ADMIN — CIPROFLOXACIN 400 MG: 2 INJECTION, SOLUTION INTRAVENOUS at 13:45

## 2018-03-29 RX ADMIN — NEOSTIGMINE METHYLSULFATE 3 MG: 1 INJECTION INTRAVENOUS at 15:05

## 2018-03-29 NOTE — ANESTHESIA PREPROCEDURE EVALUATION
Anesthetic History   No history of anesthetic complications            Review of Systems / Medical History  Patient summary reviewed and pertinent labs reviewed    Pulmonary  Within defined limits              Comments: Denies GAVIN symptoms, admits to snoring   Neuro/Psych   Within defined limits           Cardiovascular    Hypertension              Exercise tolerance: <4 METS     GI/Hepatic/Renal     GERD: well controlled           Endo/Other    Diabetes: well controlled    Morbid obesity     Other Findings              Physical Exam    Airway  Mallampati: I  TM Distance: 4 - 6 cm  Neck ROM: normal range of motion   Mouth opening: Normal     Cardiovascular    Rhythm: regular  Rate: normal         Dental         Pulmonary  Breath sounds clear to auscultation               Abdominal         Other Findings            Anesthetic Plan    ASA: 3  Anesthesia type: general          Induction: Intravenous  Anesthetic plan and risks discussed with: Patient

## 2018-03-29 NOTE — INTERVAL H&P NOTE
H&P Update:  Sarai Kimball was seen and examined. History and physical has been reviewed. The patient has been examined.  There have been no significant clinical changes since the completion of the originally dated History and Physical.    Signed By: Sujatha Flor MD     March 29, 2018 1:34 PM

## 2018-03-29 NOTE — PROGRESS NOTES
- Visited as requested, to offer spiritual support and prayer prior to surgery.     Zena Murry MDiv, [address] [place] [address]

## 2018-03-29 NOTE — ANESTHESIA POSTPROCEDURE EVALUATION
Post-Anesthesia Evaluation and Assessment    Patient: Jessica Coronado MRN: [id number]  SSN: [id number]    YOB: 1945  Age: 71 y.o. Sex: female       Cardiovascular Function/Vital Signs  Visit Vitals    /66    Pulse 74    Temp 36.7 °C (98.1 °F)    Resp 16    Ht 5' 4.5\" (1.638 m)    Wt 129.7 kg (285 lb 14.4 oz)    SpO2 97%    BMI 48.32 kg/m2       Patient is status post general anesthesia for Procedure(s):   REPAIR OF INCARCERATED AND RECURRENT INCISION HERNIA  WITH MESH AND PLACEMENT OF ON-Q  PUMP. Nausea/Vomiting: None    Postoperative hydration reviewed and adequate. Pain:  Pain Scale 1: Numeric (0 - 10) (03/29/18 1550)  Pain Intensity 1: 7 (03/29/18 1550)   Managed    Neurological Status:   Neuro (WDL): Within Defined Limits (03/29/18 1530)   At baseline    Mental Status and Level of Consciousness: Arousable    Pulmonary Status:   O2 Device: Nasal cannula (03/29/18 1548)   Adequate oxygenation and airway patent    Complications related to anesthesia: None    Post-anesthesia assessment completed.  No concerns    Signed By: Samra Odom MD     March 29, 2018

## 2018-03-29 NOTE — OP NOTES
Operative Report    Patient: Lamar Rushing MRN: [id number]     YOB: 1945  Age: 71 y.o. Sex: female       Date of Surgery: 3/29/2018     Preoperative Diagnosis: INCARCERATED AND RECURRENT INCISIONAL HERNIA     Postoperative Diagnosis: INCARCERATED AND RECURRENT INCISIONAL HERNIA     Procedure:  Procedure(s):   REPAIR OF INCARCERATED AND RECURRENT INCISION HERNIA  WITH MESH AND PLACEMENT OF ON-Q  PUMP; removal of mesh    Anesthesia: General     Complications: none    Indications:  As outlined in History and Physical.    Procedure Details   Informed consent was obtained and the patient was brought to the operating room and placed on the table in a supine position. After successful induction of anesthesia, the abdomen was prepped and draped in the standard fashion. A vertical midline incision was made  and was carried down through the subcutaneous tissues with cautery until the hernia sac was identified. Herniated fat was dissected from the surrounding subcutaneous structures with cautery and it was traced to the fascia. The defect was incised to enter the peritoneum. Several additional defects were identified, containing mostly fat- this was both preperitoneal fat as well as omental and epiploic fat. The defects were positioned consistent with the periphery of previously placed onlay mesh, which was identified at the lower aspect of the hernia \"field. \"  A loop of small bowel was herniated at the cephalad margin of the mesh and was densely adherent to the mesh. This was carefully dissected free with scissors. Once free, and additional abdominal wall adhesions were freed, the small bowel was examined and there was a definite area of caliber change at the level of the adhesions consistent with a chronic, low-grade obstruction. Interloop adhesions in this region were cut. Eventually, 7 small fascial defects were identified, and all the fascial bridges were incised to create a single defect. The previous mesh was removed to avoid mesh overlap. This was done with cautery around the periphery of the mesh and all visible prolene sutures were removed. At the inferior aspect of this mesh, ethibond suture was encountered suggesting an even earlier primary hernia repair. The final defect measured 8 by 6 cm. A  12 x 16cm Ventrio mesh was brought to the field and inked along its major axes. It was inserted into the abdomen and unfurled, taking care to assure no entrapped omentum or bowel, and was noted to lay nicely without folding. Several fires of the secure strap were placed at the 12 and 6:00 positions to maintain orientation and centered position. A series of #1 interrupted prolene sutures were then placed in the midline, in a figure-of-eight fashion, after debriding inadequate fascia and scar tissue. After all were placed, the fascial edges were approximated by pulling on the sutures while the secure strap was used to fix the positioning pouches  to the anterior abdominal wall to anchor the mesh. The midline sutures were then tied to close the fascia over the mesh. The area was irrigated and confirmed to be hemostatic. A drain was placed over the fascia in the subQ space. To aid with postoperative pain management, increase mobility, and minimize narcotic usage an on-Q painbuster was placed. The 2cc/hr catheter was tunneled from the drain site into the wound and then the introducer was passed in a retrorectus plane and the catheter inserted. The catheter was primed with 0.25% marcaine, which was also used to fill the reservoir to a volume of approximately 270cc. The catheter was affixed to the drain at the entrance site with steri-strips. The wound was then closed with  3-0 vicryl in the deep space and the skin with subcuticular 4-0 Vicryl. Steri-Strips, telfa, and a tegaderm dressing was applied.   The patient tolerated the procedure well and was awakened from anesthesia and taken to recovery in satisfactory condition. Sponge, needle, and instrument counts were correct as reported to me.       * No specimens in log *    Ca Britt MD  March 29, 2018

## 2018-03-29 NOTE — PERIOP NOTES
TRANSFER - OUT REPORT:    Verbal report given to [facility] RN on Kimberlyjasmeet Fonseca  being transferred to [facility] for routine post - op       Report consisted of patients Situation, Background, Assessment and   Recommendations(SBAR). Information from the following report(s) SBAR, OR Summary, Procedure Summary, Intake/Output and MAR was reviewed with the receiving nurse. Lines:   Peripheral IV 03/29/18 Left Hand (Active)   Site Assessment Clean, dry, & intact 3/29/2018  3:30 PM   Phlebitis Assessment 0 3/29/2018  3:30 PM   Infiltration Assessment 0 3/29/2018  3:30 PM   Dressing Status Clean, dry, & intact; Occlusive 3/29/2018  3:30 PM   Dressing Type Transparent;Tape 3/29/2018  3:30 PM   Hub Color/Line Status Pink; Infusing 3/29/2018  3:30 PM   Alcohol Cap Used No 3/29/2018  3:30 PM        Opportunity for questions and clarification was provided. Patient transported with:   O2 @ 4 liters    VTE prophylaxis orders have been written for Kimberly Fonseca. Patient and family given floor number and nurses name. Family updated re: pt status after security code verified.

## 2018-03-29 NOTE — IP AVS SNAPSHOT
[facility] 
 
 
 [facility] [address] [facility] 
[phone] Patient: Alexandria Del Castillo MRN: [id number] :1945 About your hospitalization You were admitted on:  2018 You last received care in the:  [facility] 2 SURGICAL You were discharged on:  2018 Why you were hospitalized Your primary diagnosis was:  Not on File Your diagnoses also included:  Recurrent Incisional Hernia With Incarceration Follow-up Information Follow up With Details Comments Contact Info Annie Grier MD   [address] [facility] [address] 
[phone] Your Scheduled Appointments Tuesday April 10, 2018  8:30 AM EDT Global Post Op with ALYSE Aguirre  
[place] [facility] ([address] [place] [id number] [phone] Discharge Orders None A check mahendra indicates which time of day the medication should be taken. My Medications START taking these medications Instructions Each Dose to Equal  
 Morning Noon Evening Bedtime  
 oxyCODONE IR 5 mg immediate release tablet Commonly known as:  Nobie Rojelio Your last dose was: Your next dose is: Take 1-3 Tabs by mouth every four (4) hours as needed. Max Daily Amount: 90 mg.  
 5-15 mg CONTINUE taking these medications Instructions Each Dose to Equal  
 Morning Noon Evening Bedtime  
 acetaminophen 325 mg tablet Commonly known as:  TYLENOL Your last dose was: Your next dose is: Take one tab by mouth with breakfast, lunch, dinner, and bedtime ALEVE 220 mg tablet Generic drug:  naproxen sodium Your last dose was: Your next dose is: Take 220 mg by mouth daily as needed (hold for surgery- instructed 3/22/18).   
 220 mg  
 allopurinol 300 mg tablet Commonly known as:  Alfie Montanez Your last dose was: Your next dose is: Take 1 Tab by mouth daily. 300 mg  
    
   
   
   
  
 aspirin 81 mg tablet Your last dose was: Your next dose is: Take 81 mg by mouth daily. Indications: prevention of transient ischemic attack 81 mg  
    
   
   
   
  
 cholecalciferol 1,000 unit Cap Commonly known as:  VITAMIN D3 Your last dose was: Your next dose is: Take 2,000 Units by mouth daily. Indications: hold for 7 days prior to surgery per anesthesia protocol - instructed 3/22/18  
 2000 Units CULTURELLE 10 billion cell capsule Generic drug:  lactobacillus rhamnosus gg 10 billion cell Your last dose was: Your next dose is: Take 1 Cap by mouth two (2) times a day. 1 Cap  
    
   
   
   
  
 fish oil-dha-epa 1,200-144-216 mg Cap Your last dose was: Your next dose is: Take 1 Tab by mouth daily. Indications: hold for 7 days prior to surgery per anesthesia protocol - instructed 3/22/18  
 1 Tab  
    
   
   
   
  
 hydroCHLOROthiazide 12.5 mg tablet Commonly known as:  HYDRODIURIL Your last dose was: Your next dose is: Take 1 Tab by mouth daily. 12.5 mg  
    
   
   
   
  
 lisinopril 40 mg tablet Commonly known as:  Jose Moreno Your last dose was: Your next dose is: Take 1 Tab by mouth daily. 40 mg  
    
   
   
   
  
 multivitamin tablet Commonly known as:  ONE A DAY Your last dose was: Your next dose is: Take 1 Tab by mouth every morning. 1 Tab  
    
   
   
   
  
 omeprazole 20 mg capsule Commonly known as:  PriLOSEC Your last dose was: Your next dose is: Take 1 Cap by mouth daily.   
 20 mg  
    
   
   
   
  
 OSTEO BI-FLEX PO Your last dose was: Your next dose is: Take  by mouth daily. Indications: hold for 7 days prior to surgery per anesthesia protocol - instructed 3/22/18  
     
   
   
   
  
 pravastatin 20 mg tablet Commonly known as:  PRAVACHOL Your last dose was: Your next dose is: Take 1 Tab by mouth nightly. 20 mg  
    
   
   
   
  
 VITAMIN C 500 mg tablet Generic drug:  ascorbic acid (vitamin C) Your last dose was: Your next dose is: Take one tablet daily to increase iron absorption ZEASORB AF 2 % topical powder Generic drug:  miconazole Your last dose was: Your next dose is:    
   
   
 Apply  to affected area two (2) times a day. ASK your doctor about these medications Instructions Each Dose to Equal  
 Morning Noon Evening Bedtime  
 metFORMIN 500 mg tablet Commonly known as:  GLUCOPHAGE Your last dose was: Your next dose is: Take one tablet twice daily with breakfast and dinner Where to Get Your Medications Information on where to get these meds will be given to you by the nurse or doctor. ! Ask your nurse or doctor about these medications  
  oxyCODONE IR 5 mg immediate release tablet Opioid Education Prescription Opioids: What You Need to Know: 
 
 
ACTIVITY: 
? Try to take a few short walks with help around the house later today. It is very important to take short walks to avoid blood clots and pneumonia.  
? You may be light-headed or sleepy from anesthesia, so be careful going up and down stairs. Avoid any activity that involves lifting/pushing more than 30 pounds until your followup appointment DIET: 
? Stay on soft foods, as in the hospital, for another day or two until your GI tract has normalized. ? Later  you may resume a more normal diet, depending on how you feel PAIN: 
? You will be given a prescription for pain medication. ? Try to take the pain medication with food, even a few crackers. ? You may also use Tylenol, Motrin, Advil, or Aleve instead of the prescription pain medication. Do no take Tylenol and the prescription pain medication within 6 hours of each other. ? URINARY RETENTION: If you are unable to empty your bladder within 6 hours after returning home, please go to your nearest Emergency Department or Urgent Care for urinary catheterization. WOUND CARE: 
? You may when you get home, unless instructed otherwise. ? It is not uncommon for the incisions to ooze or drain blood-tinged fluid. You may remove the clear dressing on the fifth postoperative day. ? Incisions will sometimes develop redness around them, up to the size of a quarter, as well as a hard lumpy feel. If this redness continues to get larger, please call the office. FOLLOW UP: 
? Your follow-up appointment is usually made when your surgery is arranged. Please call the office if you are not sure of this appointment. Keep DAQUAN until onQ reservoir is empty then contact Lizzette Benson in office next week to discuss removal; Dr. Jose Angel Nielson will be out of town next week. CALL THE DOCTOR IF: 
? You have a temperature higher than 101.5° Fahrenheit for more than 6 hours. ? You have severe nausea or vomiting. ? You develop increasing redness or infection at the incision. Continue home medications as previously prescribed. Abdominal Hernia Repair: What to Expect at Home Your Recovery After surgery to repair your hernia, you are likely to have pain for a few days. You may also feel like you have the flu, and you may have a low fever and feel tired and nauseated. This is common. You should feel better after a few days and will probably feel much better in 7 days. For several weeks you may feel twinges or pulling in the hernia repair when you move. You may have some bruising around the area of your hernia repair. This is normal. 
This care sheet gives you a general idea about how long it will take for you to recover. But each person recovers at a different pace. Follow the steps below to get better as quickly as possible. How can you care for yourself at home? Activity ? · Rest when you feel tired. Getting enough sleep will help you recover. ? · Try to walk each day. Start by walking a little more than you did the day before. Bit by bit, increase the amount you walk. Walking boosts blood flow and helps prevent pneumonia and constipation. ? · If your doctor gives you an abdominal binder to wear, use it as directed. This is an elastic bandage that wraps around your belly and upper hips. It helps support your belly muscles after surgery. ? · Avoid strenuous activities, such as biking, jogging, weight lifting, or aerobic exercise, until your doctor says it is okay. ? · Avoid lifting anything that would make you strain. This may include heavy grocery bags and milk containers, a heavy briefcase or backpack, cat litter or dog food bags, a vacuum , or a child. ? · Ask your doctor when you can drive again. ? · Most people are able to return to work within 1 to 2 weeks after surgery. But if your job requires that you to do heavy lifting or strenuous activity, you may need to take 4 to 6 weeks off from work. ? · You may shower 24 to 48 hours after surgery, if your doctor okays it. Pat the cut (incision) dry. Do not take a bath for the first 2 weeks, or until your doctor tells you it is okay. ? · Ask your doctor when it is okay for you to have sex. Diet ? · You can eat your normal diet. If your stomach is upset, try bland, low-fat foods like plain rice, broiled chicken, toast, and yogurt. ? · Drink plenty of fluids (unless your doctor tells you not to). ? · You may notice that your bowel movements are not regular right after your surgery. This is common. Avoid constipation and straining with bowel movements. You may want to take a fiber supplement every day. If you have not had a bowel movement after a couple of days, ask your doctor about taking a mild laxative. Medicines ? · Your doctor will tell you if and when you can restart your medicines. He or she will also give you instructions about taking any new medicines. ? · If you take blood thinners, such as warfarin (Coumadin), clopidogrel (Plavix), or aspirin, be sure to talk to your doctor. He or she will tell you if and when to start taking those medicines again. Make sure that you understand exactly what your doctor wants you to do. ? · Be safe with medicines. Take pain medicines exactly as directed. ¨ If the doctor gave you a prescription medicine for pain, take it as prescribed. ¨ If you are not taking a prescription pain medicine, ask your doctor if you can take an over-the-counter medicine. ? · If your doctor prescribed antibiotics, take them as directed. Do not stop taking them just because you feel better. You need to take the full course of antibiotics. ? · If you think your pain medicine is making you sick to your stomach: 
¨ Take your medicine after meals (unless your doctor has told you not to). ¨ Ask your doctor for a different pain medicine. Incision care ? · If you have strips of tape on the cut (incision) the doctor made, leave the tape on for a week or until it falls off. Or follow your doctor's instructions for removing the tape. ? · If you have staples closing the cut, you will need to visit your doctor in 1 to 2 weeks to have them removed. ? · Wash the area daily with warm, soapy water, and pat it dry. Don't use hydrogen peroxide or alcohol, which can slow healing. You may cover the area with a gauze bandage if it weeps or rubs against clothing. Change the bandage every day. Other instructions ? · Hold a pillow over your incision when you cough or take deep breaths. This will support your belly and decrease your pain. ? · Do breathing exercises at home as instructed by your doctor. This will help prevent pneumonia. ? · If you had laparoscopic surgery, you may also have pain in your left shoulder. The pain usually lasts about a day or two. Follow-up care is a key part of your treatment and safety. Be sure to make and go to all appointments, and call your doctor if you are having problems. It's also a good idea to know your test results and keep a list of the medicines you take. When should you call for help? Call 911 anytime you think you may need emergency care. For example, call if: 
? · You passed out (lost consciousness). ? · You are short of breath. ?Call your doctor now or seek immediate medical care if: 
? · You are sick to your stomach and cannot drink fluids. ? · You have signs of a blood clot in your leg (called a deep vein thrombosis), such as: 
¨ Pain in your calf, back of the knee, thigh, or groin. ¨ Redness and swelling in your leg or groin. ? · You have signs of infection, such as: 
¨ Increased pain, swelling, warmth, or redness. ¨ Red streaks leading from the incision. ¨ Pus draining from the incision. ¨ A fever. ? · You cannot pass stools or gas. ? · You have pain that does not get better after you take pain medicine. ? · You have loose stitches, or your incision comes open. ? · Bright red blood has soaked through the bandage over your incision. ? Watch closely for changes in your health, and be sure to contact your doctor if you have any problems. Where can you learn more? Go to http://dana-justo.info/. Enter B577 in the search box to learn more about \"Abdominal Hernia Repair: What to Expect at Home. \" Current as of: May 12, 2017 Content Version: 11.4 © 8892-3163 [employer]. Care instructions adapted under license by [employer] (which disclaims liability or warranty for this information). If you have questions about a medical condition or this instruction, always ask your healthcare professional. [place] [address] any warranty or liability for your use of this information. DISCHARGE SUMMARY from Nurse PATIENT INSTRUCTIONS: 
 
After general anesthesia or intravenous sedation, for 24 hours or while taking prescription Narcotics: · Limit your activities · Do not drive and operate hazardous machinery · Do not make important personal or business decisions · Do  not drink alcoholic beverages · If you have not urinated within 8 hours after discharge, please contact your surgeon on call. Report the following to your surgeon: 
· Excessive pain, swelling, redness or odor of or around the surgical area · Temperature over 100.5 · Nausea and vomiting lasting longer than 4 hours or if unable to take medications · Any signs of decreased circulation or nerve impairment to extremity: change in color, persistent  numbness, tingling, coldness or increase pain · Any questions What to do at Home: 
Recommended activity: Activity as tolerated and No driving while on analgesics. If you experience any of the following symptoms ? · You are sick to your stomach and cannot drink fluids. ? · You have signs of a blood clot in your leg (called a deep vein thrombosis), such as: 
¨ Pain in your calf, back of the knee, thigh, or groin. ¨ Redness and swelling in your leg or groin. ? · You have signs of infection, such as: 
¨ Increased pain, swelling, warmth, or redness. ¨ Red streaks leading from the incision. ¨ Pus draining from the incision. ¨ A fever. ? · You cannot pass stools or gas. ? · You have pain that does not get better after you take pain medicine. ? · You have loose stitches, or your incision comes open. ? · Bright red blood has soaked through the bandage over your incision. ? 
, please follow up with Dr. Jose Bhagat. *  Please give a list of your current medications to your Primary Care Provider. *  Please update this list whenever your medications are discontinued, doses are 
    changed, or new medications (including over-the-counter products) are added. *  Please carry medication information at all times in case of emergency situations. These are general instructions for a healthy lifestyle: No smoking/ No tobacco products/ Avoid exposure to second hand smoke Surgeon General's Warning:  Quitting smoking now greatly reduces serious risk to your health. Obesity, smoking, and sedentary lifestyle greatly increases your risk for illness A healthy diet, regular physical exercise & weight monitoring are important for maintaining a healthy lifestyle You may be retaining fluid if you have a history of heart failure or if you experience any of the following symptoms:  Weight gain of 3 pounds or more overnight or 5 pounds in a week, increased swelling in our hands or feet or shortness of breath while lying flat in bed. Please call your doctor as soon as you notice any of these symptoms; do not wait until your next office visit. Recognize signs and symptoms of STROKE: 
 
F-face looks uneven A-arms unable to move or move unevenly S-speech slurred or non-existent T-time-call 911 as soon as signs and symptoms begin-DO NOT go Back to bed or wait to see if you get better-TIME IS BRAIN. Warning Signs of HEART ATTACK Call 911 if you have these symptoms: 
? Chest discomfort.  Most heart attacks involve discomfort in the center of the chest that lasts more than a few minutes, or that goes away and comes back. It can feel like uncomfortable pressure, squeezing, fullness, or pain. ? Discomfort in other areas of the upper body. Symptoms can include pain or discomfort in one or both arms, the back, neck, jaw, or stomach. ? Shortness of breath with or without chest discomfort. ? Other signs may include breaking out in a cold sweat, nausea, or lightheadedness. Don't wait more than five minutes to call [address]! Fast action can save your life. Calling 911 is almost always the fastest way to get lifesaving treatment. Emergency Medical Services staff can begin treatment when they arrive  up to an hour sooner than if someone gets to the hospital by car. The discharge information has been reviewed with the patient. The patient verbalized understanding. Discharge medications reviewed with the patient and appropriate educational materials and side effects teaching were provided. ___________________________________________________________________________________________________________________________________ ACO Transitions of Care Introducing [facility] offers a voluntary care coordination program to provide high quality service and care to [facility] fee-for-service beneficiaries. [facility] was designed to help you enhance your health and well-being through the following services: ? Transitions of Care  support for individuals who are transitioning from one care setting to another (example: Hospital to home). ? Chronic and Complex Care Coordination  support for individuals and caregivers of those with serious or chronic illnesses or with more than one chronic (ongoing) condition and those who take a number of different medications.   
 
 
If you meet specific medical criteria, a [facility] [employer] Coordinator may call you directly to coordinate your care with your primary care physician and your other care providers. For questions about the [facility] programs, please, contact your physicians office. For general questions or additional information about Accountable Care Organizations: 
Please visit www.medicare.gov/acos. html or call 1-800-MEDICARE (0-936.148.6565) TTY users should call 0-282.656.2649. Mompery Announcement We are excited to announce that we are making your provider's discharge notes available to you in Mompery. You will see these notes when they are completed and signed by the physician that discharged you from your recent hospital stay. If you have any questions or concerns about any information you see in Mompery, please call the Health Information Department where you were seen or reach out to your Primary Care Provider for more information about your plan of care. Introducing [address]! Dear Jim Mess: Thank you for requesting a Mompery account. Our records indicate that you already have an active Mompery account. You can access your account anytime at [email or web address] Did you know that you can access your hospital and ER discharge instructions at any time in Mompery? You can also review all of your test results from your hospital stay or ER visit. Additional Information If you have questions, please visit the Frequently Asked Questions section of the Mompery website at [email or web address] Remember, Mompery is NOT to be used for urgent needs. For medical emergencies, dial 911. Now available from your iPhone and Android! Introducing Az Duke As a [employer] patient, I wanted to make you aware of our electronic visit tool called Az Duke. [employer] 24/7 allows you to connect within minutes with a medical provider 24 hours a day, seven days a week via a mobile device or tablet or logging into a secure website from your computer. You can access [employer] from anywhere in the [place]. A virtual visit might be right for you when you have a simple condition and feel like you just dont want to get out of bed, or cant get away from work for an appointment, when your regular [facility] provider is not available (evenings, weekends or holidays), or when youre out of town and need minor care. Electronic visits cost only $49 and if the [facility] weeSPIN 24/7 provider determines a prescription is needed to treat your condition, one can be electronically transmitted to a nearby pharmacy*. Please take a moment to enroll today if you have not already done so. The enrollment process is free and takes just a few minutes. To enroll, please download the [employer] jonny to your tablet or phone, or visit [email or web address] to enroll on your computer. And, as an [address] [place] [address] patient with a [employer] account, the results of your visits will be scanned into your electronic medical record and your primary care provider will be able to view the scanned results. We urge you to continue to see your regular [facility] provider for your ongoing medical care. And while your primary care provider may not be the one available when you seek a [employer] virtual visit, the peace of mind you get from getting a real diagnosis real time can be priceless. For more information on [employer], view our Frequently Asked Questions (FAQs) at [email or web address]. Sincerely, 
 
Khanh Navarrete MD 
Chief Medical Officer [employer] *:  certain medications cannot be prescribed via [employer] Providers Seen During Your Hospitalization Provider Specialty Primary office phone Daniela Hurtado MD General Surgery [phone] Your Primary Care Physician (PCP) Primary Care Physician Office Phone Office Fax Anushka Jenkins [address] [phone] You are allergic to the following Allergen Reactions Adhesive Rash Clindamycin Rash Keflex (Cephalexin) Rash Recent Documentation Height Weight BMI OB Status Smoking Status 1.638 m 129.7 kg 48.32 kg/m2 Hysterectomy Never Smoker Emergency Contacts Name Discharge Info Relation Home Work Mobile Yoselyn Redman DISCHARGE CAREGIVER [3] Daughter [21] [phone] Patient Belongings The following personal items are in your possession at time of discharge: 
  Dental Appliances: None  Visual Aid: Glasses      Home Medications: None   Jewelry: None  Clothing: Footwear, Pants, Shirt, Undergarments    Other Valuables: Eyeglasses Please provide this summary of care documentation to your next provider. Signatures-by signing, you are acknowledging that this After Visit Summary has been reviewed with you and you have received a copy. Patient Signature:  ____________________________________________________________ Date:  ____________________________________________________________  
  
Geardarek Españaulds Provider Signature:  ____________________________________________________________ Date:  ____________________________________________________________

## 2018-03-29 NOTE — H&P (VIEW-ONLY)
Samm Reddy MD, [facility], [facility]  Hao Burrows  Phone [phone]   Fax [phone]      Date of visit: 3/13/2018     Primary/Requesting provider: Cynthia Nova MD    Chief Complaint   Patient presents with    Ventral Hernia            HISTORY OF PRESENT ILLNESS  Tiffany Beltran is a 71 y.o. female. HPI  Patient is a 71 y.o. female who presents for discussion of a ventral hernia. She has known of the hernia for at least 10 years, but this has only recently become symptomatic. She notes mild discomfort- the area has just \"not felt good\" but she denies tomasa pain. She does note that it has become sore if the area is bumped, which happens fairly frequently. She has constipation-predominant IBS, which has been stable for years. Otherwise, she denies nausea, vomiting, fever, chills. The hernia itself has not dramatically enlarged recently. Her surgical history is significant for umbilical herniaplasty by Dr C[facility] >10 years ago. She reports this was complicated by post-op infection, which was drained in the office, with no plan or attempt at mesh removal and the area subsequently healed spontaneously. She began to notice recurrence not too long after. Medications:   Current Outpatient Prescriptions   Medication Sig    nitrofurantoin, macrocrystal-monohydrate, (MACROBID) 100 mg capsule Take 1 Cap by mouth two (2) times a day for 10 days.  allopurinol (ZYLOPRIM) 300 mg tablet Take 1 Tab by mouth daily.  pravastatin (PRAVACHOL) 20 mg tablet Take 1 Tab by mouth nightly.  lisinopril (PRINIVIL, ZESTRIL) 40 mg tablet Take 1 Tab by mouth daily.  hydroCHLOROthiazide (HYDRODIURIL) 12.5 mg tablet Take 1 Tab by mouth daily.  lactobacillus rhamnosus gg 10 billion cell (CULTURELLE) 10 billion cell capsule Take 1 Cap by mouth two (2) times a day.     acetaminophen (TYLENOL) 325 mg tablet Take one tab by mouth with breakfast, lunch, dinner, and bedtime    omeprazole (PRILOSEC) 20 mg capsule Take 1 Cap by mouth daily.  metFORMIN (GLUCOPHAGE) 500 mg tablet Take one tablet twice daily with breakfast and dinner    miconazole (ZEASORB AF) 2 % topical powder Apply  to affected area two (2) times a day.  ascorbic acid (VITAMIN C) 500 mg tablet Take one tablet daily to increase iron absorption    Cholecalciferol, Vitamin D3, 1,000 unit cap Take 2,000 Units by mouth daily.  fish oil-dha-epa 1,200-144-216 mg cap Take  by mouth daily.  GLUCOSAMINE HCL/CHONDR GRANADOS A NA (OSTEO BI-FLEX PO) Take  by mouth daily.  aspirin 81 mg tablet Take 81 mg by mouth daily. Take DOS per anesthesia protocol. Indications: PREVENTION OF TRANSIENT ISCHEMIC ATTACKS    multivitamin (ONE A DAY) tablet Take 1 Tab by mouth every morning. No current facility-administered medications for this visit. Allergies:    Allergies   Allergen Reactions    Adhesive Rash    Clindamycin Rash    Keflex [Cephalexin] Rash        Past History:  Past Medical History:   Diagnosis Date    Arthritis     osteo    Cataracts, bilateral     Chronic pain     right shoulder x 2 years    Edema 2/26/2013    GERD (gastroesophageal reflux disease)     controlled with medication    Gout 2/26/2013    Hyperglycemia 8/31/2015    Hyperlipidemia 2/26/2013    Hypertension     controlled with meds    IBS (irritable bowel syndrome)     Ill-defined condition     hgba1c was 5.4 on 03/2016    Morbid obesity ([address]     bmi=47.2    Other ill-defined conditions(799.89)     back problems    Prediabetes 6/22/2016    Sciatica     right worse than left    Ulcer of ankle ([address] 2/26/2013    Vitamin D deficiency 2/19/2015      Past Surgical History:   Procedure Laterality Date    HX APPENDECTOMY  1954    HX CATARACT REMOVAL Bilateral     2014 - Dr. Kennedi Hyatt Bilateral 2016    Dr. Keila Lemons    right inguinal hernia   Fran umbilical hernia repair    HX HYSTERECTOMY  1986    HX LAP CHOLECYSTECTOMY  1994    HX MOHS PROCEDURES  2011    left    HX ORTHOPAEDIC  1982  2002    right elbow surgery    HX SHOULDER REPLACEMENT Right     HX TONSILLECTOMY      TOTAL KNEE ARTHROPLASTY  2005    right    TOTAL KNEE ARTHROPLASTY  2012    left        Family and Social History:  Family History   Problem Relation Age of Onset    Cancer Mother      colon    Hypertension Mother     Stroke Mother     Heart Disease Mother      CHF and a Pacemaker    Hypertension Father     Cancer Father      prostate and colon    Cancer Maternal Grandmother      colon    Breast Cancer Neg Hx      Social History     Social History    Marital status:      Spouse name: N/A    Number of children: N/A    Years of education: N/A     Occupational History    Not on file. Social History Main Topics    Smoking status: Never Smoker    Smokeless tobacco: Never Used    Alcohol use No    Drug use: No    Sexual activity: Not Currently     Other Topics Concern    Not on file     Social History Narrative       Review of Systems   Constitutional: Positive for chills and fever. HENT: Negative. Eyes: Negative. Respiratory: Positive for shortness of breath. Negative for cough, hemoptysis and sputum production. Cardiovascular: Negative. Negative for chest pain, palpitations, orthopnea, claudication, leg swelling and PND. Gastrointestinal: Positive for abdominal pain, constipation and diarrhea. Negative for blood in stool, melena, nausea and vomiting. Genitourinary: Positive for urgency. Musculoskeletal: Negative. Skin: Negative. Neurological: Negative. Negative for headaches. Endo/Heme/Allergies: Negative. Psychiatric/Behavioral: Negative. Negative for depression, hallucinations, substance abuse and suicidal ideas. The patient is not nervous/anxious.         Physical Exam   Constitutional: She is oriented to person, place, and time. She appears well-developed and well-nourished. She is cooperative. Non-toxic appearance. Generalized super-morbid obesity   HENT:   Head: Normocephalic and atraumatic. Mouth/Throat: Oropharynx is clear and moist.   Eyes: Conjunctivae and EOM are normal. No scleral icterus. Neck: Normal range of motion. No JVD present. No tracheal deviation present. No thyromegaly present. Cardiovascular: Normal rate and regular rhythm. Exam reveals no gallop and no friction rub. No murmur heard. Pulmonary/Chest: Effort normal and breath sounds normal. No respiratory distress. She has no wheezes. She has no rales. Abdominal: Soft. Bowel sounds are normal. There is tenderness (at upper aspect of central midline incision where hernia is readily palpable. this appears incarcerated and exact margin of fascial defect not readily discernable). There is no rebound and no guarding. Musculoskeletal: She exhibits edema. No gross deformities   Neurological: She is alert and oriented to person, place, and time. No cranial nerve deficit. Skin: Skin is warm. She is not diaphoretic. Psychiatric: She has a normal mood and affect. Her behavior is normal.   Vitals reviewed. CT Results (most recent):    Results from Hospital Encounter encounter on 03/02/18   CT ABD PELV W CONT   Narrative CT of the Abdomen and Pelvis    INDICATION:  Abdominal wall mass, hernia    Multiple axial images were obtained through the abdomen and pelvis after  intravenous injection of 100mL of Isovue 370. Radiation dose reduction  techniques were used for this study: All CT scans performed at this facility  use one or all of the following: Automated exposure control, adjustment of the  mA and/or kVp according to patient's size, iterative reconstruction. FINDINGS:  Abdomen CT:  The lung bases are clear. There are no lesions in the liver or  spleen. The gallbladder is absent. There is no bile duct dilatation.   The  adrenal glands and pancreas appear normal.  There is normal enhancement of the  kidneys. There is no hydronephrosis. There is no adenopathy. There are no  inflammatory changes or fluid collections in the abdomen. Pelvis CT: There is a ventral hernia near the umbilicus. There is small bowel  involvement. The bowel proximal to hernia is slightly dilated. There is  minimal edema in the involved fat. No abscess or fluid collection is seen. There are hysterectomy changes. There is sigmoid diverticulosis. There is no  significant adenopathy. There is moderate degenerative change in the spine. There are no bony lesions. Impression IMPRESSION:   1. Midline ventral hernia with small bowel involvement, mild associated bowel  distention. ASSESSMENT and PLAN  Encounter Diagnoses   Name Primary?  Incisional hernia, without obstruction or gangrene Yes     By my review, there is no CT evidence of obstruction. Her chronic IBS-C symptoms are stable and as such are nearly certainly not hernia related. It would not be unheard of, however, for any post-prandial diarrheal symptoms to improve after abdominal surgery IF extensive adhesiolysis is required as there can occasionally be clinically significant but low grade obstructive adhesions. Unfortunately, she seems to be developing increasing discomfort at the hernia site. Given her overall physical health, I think it is wise to proceed with repair now, prior to any significant overall health decline and she is in agreement, although not excited about having surgery. Will proceed with recurrent, incarcerated,  incisional hernia repair with mesh. Technical details of the procedure are reviewed. Risks reviewed include risks of anesthesia, bleeding, infection, visceral injury, persistent post-operative pain, and hernia recurrence. All questions are answered.

## 2018-03-30 PROCEDURE — 65270000029 HC RM PRIVATE

## 2018-03-30 PROCEDURE — 74011250637 HC RX REV CODE- 250/637: Performed by: SURGERY

## 2018-03-30 PROCEDURE — 77010033678 HC OXYGEN DAILY

## 2018-03-30 PROCEDURE — 74011250636 HC RX REV CODE- 250/636: Performed by: SURGERY

## 2018-03-30 RX ORDER — OXYCODONE HYDROCHLORIDE 5 MG/1
5-15 TABLET ORAL
Qty: 40 TAB | Refills: 0 | Status: SHIPPED | OUTPATIENT
Start: 2018-03-30 | End: 2018-06-05

## 2018-03-30 RX ADMIN — LISINOPRIL 40 MG: 20 TABLET ORAL at 08:38

## 2018-03-30 RX ADMIN — SODIUM CHLORIDE AND POTASSIUM CHLORIDE: 9; 1.49 INJECTION, SOLUTION INTRAVENOUS at 16:43

## 2018-03-30 RX ADMIN — OXYCODONE HYDROCHLORIDE 5 MG: 5 TABLET ORAL at 11:46

## 2018-03-30 RX ADMIN — ENOXAPARIN SODIUM 40 MG: 40 INJECTION SUBCUTANEOUS at 21:13

## 2018-03-30 RX ADMIN — OXYCODONE HYDROCHLORIDE 10 MG: 5 TABLET ORAL at 16:57

## 2018-03-30 RX ADMIN — SODIUM CHLORIDE AND POTASSIUM CHLORIDE: 9; 1.49 INJECTION, SOLUTION INTRAVENOUS at 06:47

## 2018-03-30 RX ADMIN — OXYCODONE HYDROCHLORIDE 5 MG: 5 TABLET ORAL at 22:56

## 2018-03-30 RX ADMIN — HYDROCHLOROTHIAZIDE 12.5 MG: 12.5 CAPSULE ORAL at 08:38

## 2018-03-30 RX ADMIN — OXYCODONE HYDROCHLORIDE 5 MG: 5 TABLET ORAL at 06:10

## 2018-03-30 RX ADMIN — ALLOPURINOL 300 MG: 300 TABLET ORAL at 08:38

## 2018-03-30 RX ADMIN — OXYCODONE HYDROCHLORIDE 5 MG: 5 TABLET ORAL at 01:42

## 2018-03-30 RX ADMIN — ASPIRIN 81 MG: 81 TABLET, COATED ORAL at 08:38

## 2018-03-30 NOTE — PROGRESS NOTES
Sore.  (+)flatus and BM. Has ambulated in room. Tolerated clears. abd-central tenderness.  (+)BS. DAQUAN scant, bloody. onQ in place    Plan-  Has been advanced to full liquids  Need to ambulate in hallway  If tolerates above, home tomorrow. She should keep DAQUAN until onQ reservoir is empty then contact Nidhi Panda in office next week to discuss removal as I will be out of town.

## 2018-03-30 NOTE — PROGRESS NOTES
Problem: Falls - Risk of  Goal: *Absence of Falls  Document Sarika Fall Risk and appropriate interventions in the flowsheet.    Outcome: Progressing Towards Goal  Fall Risk Interventions:  Mobility Interventions: Communicate number of staff needed for ambulation/transfer, OT consult for ADLs, Patient to call before getting OOB, PT Consult for mobility concerns         Medication Interventions: Patient to call before getting OOB, Teach patient to arise slowly    Elimination Interventions: Call light in reach, Patient to call for help with toileting needs, Toilet paper/wipes in reach

## 2018-03-31 VITALS
HEART RATE: 83 BPM | WEIGHT: 285.9 LBS | TEMPERATURE: 98.7 F | BODY MASS INDEX: 47.63 KG/M2 | RESPIRATION RATE: 18 BRPM | HEIGHT: 65 IN | SYSTOLIC BLOOD PRESSURE: 108 MMHG | OXYGEN SATURATION: 94 % | DIASTOLIC BLOOD PRESSURE: 66 MMHG

## 2018-03-31 PROCEDURE — 74011250637 HC RX REV CODE- 250/637: Performed by: SURGERY

## 2018-03-31 PROCEDURE — 74011250636 HC RX REV CODE- 250/636: Performed by: SURGERY

## 2018-03-31 RX ADMIN — HYDROCHLOROTHIAZIDE 12.5 MG: 12.5 CAPSULE ORAL at 08:52

## 2018-03-31 RX ADMIN — ALLOPURINOL 300 MG: 300 TABLET ORAL at 08:52

## 2018-03-31 RX ADMIN — OXYCODONE HYDROCHLORIDE 5 MG: 5 TABLET ORAL at 06:13

## 2018-03-31 RX ADMIN — OXYCODONE HYDROCHLORIDE 5 MG: 5 TABLET ORAL at 10:18

## 2018-03-31 RX ADMIN — ASPIRIN 81 MG: 81 TABLET, COATED ORAL at 08:50

## 2018-03-31 RX ADMIN — SODIUM CHLORIDE AND POTASSIUM CHLORIDE: 9; 1.49 INJECTION, SOLUTION INTRAVENOUS at 04:00

## 2018-03-31 RX ADMIN — LISINOPRIL 40 MG: 20 TABLET ORAL at 08:52

## 2018-03-31 NOTE — DISCHARGE SUMMARY
[facility]  [place], [address] [facility]  [phone]   Discharge Summary     Alfa Garcia  MRN: [id number]     : 1945     Age: 71 y.o. Admit date: 3/29/2018     Discharge date: 2018  Attending Physician: Jemal Bautista MD  Primary Discharge Diagnosis:   Active Problems:    Recurrent incisional hernia with incarceration (3/29/2018)      Primary Operations or Procedures Performed :  Procedure(s):   REPAIR OF INCARCERATED AND RECURRENT INCISION HERNIA  WITH MESH AND PLACEMENT OF ON-Q  PUMP     Brief History and Reason for Admission: Alfa Garcia was admitted with the following history of present illness. Patient is a 71 y.o. female who presents for discussion of a ventral hernia. She has known of the hernia for at least 10 years, but this has only recently become symptomatic. She notes mild discomfort- the area has just \"not felt good\" but she denies tomasa pain. She does note that it has become sore if the area is bumped, which happens fairly frequently. She has constipation-predominant IBS, which has been stable for years. Otherwise, she denies nausea, vomiting, fever, chills. The hernia itself has not dramatically enlarged recently.     Her surgical history is significant for umbilical herniaplasty by  [facility] >10 years ago. She reports this was complicated by post-op infection, which was drained in the office, with no plan or attempt at mesh removal and the area subsequently healed spontaneously. She began to notice recurrence not too long after. Hospital Course: The patient underwent an umbilical hernia repair on 3/29/18. Bowel function was evident by discharge. The post operative course was as expected.        Condition at Discharge: good    Discharge Medications:   Current Discharge Medication List      START taking these medications    Details   oxyCODONE IR (ROXICODONE) 5 mg immediate release tablet Take 1-3 Tabs by mouth every four (4) hours as needed. Max Daily Amount: 90 mg.  Qty: 40 Tab, Refills: 0    Associated Diagnoses: Incisional hernia without obstruction or gangrene         CONTINUE these medications which have NOT CHANGED    Details   naproxen sodium (ALEVE) 220 mg tablet Take 220 mg by mouth daily as needed (hold for surgery- instructed 3/22/18). allopurinol (ZYLOPRIM) 300 mg tablet Take 1 Tab by mouth daily. Qty: 90 Tab, Refills: 3      pravastatin (PRAVACHOL) 20 mg tablet Take 1 Tab by mouth nightly. Qty: 90 Tab, Refills: 3      lisinopril (PRINIVIL, ZESTRIL) 40 mg tablet Take 1 Tab by mouth daily. Qty: 90 Tab, Refills: 3      hydroCHLOROthiazide (HYDRODIURIL) 12.5 mg tablet Take 1 Tab by mouth daily. Qty: 90 Tab, Refills: 3      lactobacillus rhamnosus gg 10 billion cell (CULTURELLE) 10 billion cell capsule Take 1 Cap by mouth two (2) times a day. acetaminophen (TYLENOL) 325 mg tablet Take one tab by mouth with breakfast, lunch, dinner, and bedtime      omeprazole (PRILOSEC) 20 mg capsule Take 1 Cap by mouth daily. Qty: 90 Cap, Refills: 3      ascorbic acid (VITAMIN C) 500 mg tablet Take one tablet daily to increase iron absorption      Cholecalciferol, Vitamin D3, 1,000 unit cap Take 2,000 Units by mouth daily. Indications: hold for 7 days prior to surgery per anesthesia protocol - instructed 3/22/18      fish oil-dha-epa 1,200-144-216 mg cap Take 1 Tab by mouth daily. Indications: hold for 7 days prior to surgery per anesthesia protocol - instructed 3/22/18      GLUCOSAMINE HCL/CHONDR GRANADOS A NA (OSTEO BI-FLEX PO) Take  by mouth daily. Indications: hold for 7 days prior to surgery per anesthesia protocol - instructed 3/22/18      aspirin 81 mg tablet Take 81 mg by mouth daily. Indications: prevention of transient ischemic attack      multivitamin (ONE A DAY) tablet Take 1 Tab by mouth every morning.       miconazole (ZEASORB AF) 2 % topical powder Apply  to affected area two (2) times a day.         STOP taking these medications       metFORMIN (GLUCOPHAGE) 500 mg tablet Comments:   Reason for Stopping:                 Disposition/Discharge Instructions/Follow-up Care: Instructions following Hernia Repair:    ACTIVITY:   Try to take a few short walks with help around the house later today. It is very important to take short walks to avoid blood clots and pneumonia.  You may be light-headed or sleepy from anesthesia, so be careful going up and down stairs. Avoid any activity that involves lifting/pushing more than 30 pounds until your followup appointment  DIET:   Stay on soft foods, as in the hospital, for another day or two until your GI tract has normalized.  Later  you may resume a more normal diet, depending on how you feel    PAIN:   You will be given a prescription for pain medication.  Try to take the pain medication with food, even a few crackers.  You may also use Tylenol, Motrin, Advil, or Aleve instead of the prescription pain medication. Do no take Tylenol and the prescription pain medication within 6 hours of each other.  URINARY RETENTION: If you are unable to empty your bladder within 6 hours after returning home, please go to your nearest Emergency Department or Urgent Care for urinary catheterization. WOUND CARE:   You may when you get home, unless instructed otherwise.  It is not uncommon for the incisions to ooze or drain blood-tinged fluid. You may remove the clear dressing on the fifth postoperative day.  Incisions will sometimes develop redness around them, up to the size of a quarter, as well as a hard lumpy feel. If this redness continues to get larger, please call the office. FOLLOW UP:   Your follow-up appointment is usually made when your surgery is arranged. Please call the office if you are not sure of this appointment.  Keep DAQUAN until onQ reservoir is empty then contact Mitzy Cortes in office next week to discuss removal; Dr. Michael Torres will be out [facility] next week. CALL THE DOCTOR IF:   You have a temperature higher than 101.5° Fahrenheit for more than 6 hours.  You have severe nausea or vomiting.  You develop increasing redness or infection at the incision. Continue home medications as previously prescribed.     Signed:  Yary Muniz NP   3/31/2018  10:31 AM

## 2018-03-31 NOTE — PROGRESS NOTES
END OF SHIFT NOTE:    INTAKE/OUTPUT  03/30 0701 - 03/31 0700  In: 1978 [P. O.:580; I.V.:1398]  Out: 300 [Urine:300]  Voiding: YES  Catheter: NO  Drain:   [place] #1 03/29/18 Left Abdomen (Active)   Site Assessment Clean, dry, & intact 3/30/2018  7:14 AM   Dressing Status Clean, dry, & intact 3/30/2018  7:14 AM   Drainage Description Serosanguinous 3/30/2018  7:14 AM   [place] Drain Airleak No 3/30/2018  7:14 AM   Status Patent; Charged;Draining 3/30/2018  7:14 AM   Y Connector Used No 3/30/2018  7:14 AM   Drainage Chamber Level (ml) 0 ml 3/29/2018  3:30 PM   Output (ml) 0 ml 3/30/2018 12:03 AM               Flatus: Patient does have flatus present. Stool:  0 occurrences. Characteristics:       Emesis: 0 occurrences. Characteristics:        VITAL SIGNS  Patient Vitals for the past 12 hrs:   Temp Pulse Resp BP SpO2   03/31/18 0326 98.3 °F (36.8 °C) 73 16 104/60 92 %   03/30/18 2300 99.4 °F (37.4 °C) 72 17 109/61 93 %   03/30/18 1900 98.9 °F (37.2 °C) 78 17 91/55 93 %       Pain Assessment  Pain Intensity 1: 4 (03/30/18 2258)  Pain Location 1: Abdomen  Pain Intervention(s) 1: Medication (see MAR)  Patient Stated Pain Goal: 4    Ambulating  Yes to BR. Dry drsg with [place] intact. Shift report given to oncoming nurse at the bedside.     Aby Hunt RN

## 2018-03-31 NOTE — PROGRESS NOTES
Discharge instructions reviewed with pt. Pt verbalized/ signed understanding/ agreement. Pt taken by wheelchair to discharge area where family was waiting to take pt home.

## 2018-04-04 ENCOUNTER — PATIENT OUTREACH (OUTPATIENT)
Dept: CASE MANAGEMENT | Age: 73
End: 2018-04-04

## 2018-04-05 NOTE — PROGRESS NOTES
Transition of Care Discharge Follow-up Questionnaire   Date/Time of REMBERTO Outreach: 4/2/2018 12:22 PM   What was the patient hospitalized for? Patient was hospitalized for Hernia Repair   Does the patient understand his/her diagnosis and/or treatment and what happened during the hospitalization? Spoke to Patient who consented to [facility] outreach, and verbalized understanding of treatment and diagnosis. Did the patient receive discharge instructions? Patient states d/c instructions received. Review any discharge instructions (see notes in [facility]). Ask patient if they understand these. Do they have any questions? Patient discussed discharge plan and instruction as Patient understood it to be with Care Coordinator. Which I have documented in the pertinent areas throughout this progress note. Were home services ordered (nursing, PT, OT, ST, etc.)? No HH ordered. If so, has the first visit occurred? If not, why? (Assist with coordination of services if necessary.) N/A   Was any DME ordered? No DME ordered at d/c. If so, has it been received? If not, why?  (Assist with coordination of arranging DME orders if necessary.) N/A   Complete a review of all medications (new, continued and discontinued meds per the D/C instructions and medication tab in [address] [place] [address]). Review of all meds completed with Patient and Care Coordinator. Oxycodone prescribed at d/c. Were all new prescriptions filled? If not, why?  (Assist with obtainment of medications if necessary.) Yes. Does the patient understand the purpose and dosing instructions for all medications? (If patient has questions, provide explanation and education.) Indicated by Patient to Care Coordinator, the purpose and dosing instructions for all medications were understood. Does the patient have any problems in performing ADLs? (If patient is unable to perform ADLs  what is the limiting factor(s)?   Do they have a support system that can assist? If no support system is present, discuss possible assistance that they may be able to obtain.) Patient states she is independent with all ADLs. Does the patient have all follow-up appointments scheduled? 7 day f/up with PCP?    7-14 day f/up with specialist?    If f/up has not been made  what actions has the care coordinator made to accomplish this? Has transportation been arranged? [facility] Pulmonary follow-up should be within 7 days of discharge; all others should have PCP follow-up within 7 days of discharge; follow-ups with other specialists as appropriate or ordered.) Yes. Surgical f/u 4/4. PCP f/u for next week per patient, who states she does not have date in front of her right now. Patient also states she has transportation. Any other questions or concerns expressed by the patient? Gratitude stated and no further questions asked or needs identified. REMBERTO Call Completed By:   Bridgett Cuadra LPN/ Care Coordinator  [address]  [address] [place] [address]  [email or web address]         This note will not be viewable in [email or web address].

## 2018-04-10 PROBLEM — Z98.890 S/P HERNIA REPAIR: Status: ACTIVE | Noted: 2018-04-10

## 2018-04-10 PROBLEM — Z87.19 S/P HERNIA REPAIR: Status: ACTIVE | Noted: 2018-04-10

## 2018-09-08 ENCOUNTER — HOSPITAL ENCOUNTER (EMERGENCY)
Age: 73
Discharge: HOME | End: 2018-09-08
Attending: STUDENT IN AN ORGANIZED HEALTH CARE EDUCATION/TRAINING PROGRAM
Payer: MEDICARE

## 2018-09-08 VITALS
OXYGEN SATURATION: 99 % | DIASTOLIC BLOOD PRESSURE: 67 MMHG | TEMPERATURE: 98.2 F | HEIGHT: 66 IN | RESPIRATION RATE: 16 BRPM | HEART RATE: 80 BPM | SYSTOLIC BLOOD PRESSURE: 140 MMHG | BODY MASS INDEX: 45.48 KG/M2 | WEIGHT: 283 LBS

## 2018-09-08 DIAGNOSIS — M54.32 SCIATICA OF LEFT SIDE: Primary | ICD-10-CM

## 2018-09-08 PROCEDURE — 99283 EMERGENCY DEPT VISIT LOW MDM: CPT | Performed by: STUDENT IN AN ORGANIZED HEALTH CARE EDUCATION/TRAINING PROGRAM

## 2018-09-08 PROCEDURE — 74011250636 HC RX REV CODE- 250/636: Performed by: STUDENT IN AN ORGANIZED HEALTH CARE EDUCATION/TRAINING PROGRAM

## 2018-09-08 PROCEDURE — 74011250637 HC RX REV CODE- 250/637: Performed by: STUDENT IN AN ORGANIZED HEALTH CARE EDUCATION/TRAINING PROGRAM

## 2018-09-08 PROCEDURE — 96372 THER/PROPH/DIAG INJ SC/IM: CPT | Performed by: STUDENT IN AN ORGANIZED HEALTH CARE EDUCATION/TRAINING PROGRAM

## 2018-09-08 RX ORDER — DEXAMETHASONE SODIUM PHOSPHATE 100 MG/10ML
10 INJECTION INTRAMUSCULAR; INTRAVENOUS
Status: COMPLETED | OUTPATIENT
Start: 2018-09-08 | End: 2018-09-08

## 2018-09-08 RX ORDER — DICLOFENAC SODIUM 10 MG/G
2 GEL TOPICAL 4 TIMES DAILY
Qty: 100 G | Refills: 0 | Status: SHIPPED | OUTPATIENT
Start: 2018-09-08 | End: 2018-09-08

## 2018-09-08 RX ORDER — KETOROLAC TROMETHAMINE 30 MG/ML
30 INJECTION, SOLUTION INTRAMUSCULAR; INTRAVENOUS
Status: COMPLETED | OUTPATIENT
Start: 2018-09-08 | End: 2018-09-08

## 2018-09-08 RX ORDER — TRAMADOL HYDROCHLORIDE 50 MG/1
50 TABLET ORAL
Status: COMPLETED | OUTPATIENT
Start: 2018-09-08 | End: 2018-09-08

## 2018-09-08 RX ORDER — IBUPROFEN 800 MG/1
800 TABLET ORAL
Qty: 20 TAB | Refills: 0 | Status: SHIPPED | OUTPATIENT
Start: 2018-09-08 | End: 2018-09-15

## 2018-09-08 RX ORDER — TRAMADOL HYDROCHLORIDE 50 MG/1
50 TABLET ORAL
Qty: 10 TAB | Refills: 0 | Status: SHIPPED | OUTPATIENT
Start: 2018-09-08 | End: 2019-02-04

## 2018-09-08 RX ORDER — DICLOFENAC SODIUM 10 MG/G
2 GEL TOPICAL 4 TIMES DAILY
Qty: 100 G | Refills: 0 | Status: SHIPPED | OUTPATIENT
Start: 2018-09-08 | End: 2018-09-15

## 2018-09-08 RX ADMIN — KETOROLAC TROMETHAMINE 30 MG: 30 INJECTION, SOLUTION INTRAMUSCULAR at 16:02

## 2018-09-08 RX ADMIN — DEXAMETHASONE SODIUM PHOSPHATE 10 MG: 10 INJECTION INTRAMUSCULAR; INTRAVENOUS at 16:02

## 2018-09-08 RX ADMIN — TRAMADOL HYDROCHLORIDE 50 MG: 50 TABLET, FILM COATED ORAL at 16:02

## 2018-09-08 NOTE — ED NOTES
I have reviewed discharge instructions with the patient. The patient verbalized understanding. Patient left ED via Discharge Method: ambulatory to Home with family. Opportunity for questions and clarification provided. Patient given 3 scripts. To continue your aftercare when you leave the hospital, you may receive an automated call from our care team to check in on how you are doing. This is a free service and part of our promise to provide the best care and service to meet your aftercare needs.  If you have questions, or wish to unsubscribe from this service please call [phone]. Thank you for Choosing our [facility] Emergency Department.

## 2018-09-08 NOTE — Clinical Note
Take the medication for pain as directed. Arrange follow up care with your primary doctor. Return for worsened symptoms, concerns or questions.

## 2018-09-08 NOTE — DISCHARGE INSTRUCTIONS
Acute Low Back Pain: Exercises  Your Care Instructions  Here are some examples of typical rehabilitation exercises for your condition. Start each exercise slowly. Ease off the exercise if you start to have pain. Your doctor or physical therapist will tell you when you can start these exercises and which ones will work best for you. When you are not being active, find a comfortable position for rest. Some people are comfortable on the floor or a medium-firm bed with a small pillow under their head and another under their knees. Some people prefer to lie on their side with a pillow between their knees. Don't stay in one position for too long. Take short walks (10 to 20 minutes) every 2 to 3 hours. Avoid slopes, hills, and stairs until you feel better. Walk only distances you can manage without pain, especially leg pain. How to do the exercises  Back stretches    1. Get down on your hands and knees on the floor. 2. Relax your head and allow it to droop. Round your back up toward the ceiling until you feel a nice stretch in your upper, middle, and lower back. Hold this stretch for as long as it feels comfortable, or about 15 to 30 seconds. 3. Return to the starting position with a flat back while you are on your hands and knees. 4. Let your back sway by pressing your stomach toward the floor. Lift your buttocks toward the ceiling. 5. Hold this position for 15 to 30 seconds. 6. Repeat 2 to 4 times. Follow-up care is a key part of your treatment and safety. Be sure to make and go to all appointments, and call your doctor if you are having problems. It's also a good idea to know your test results and keep a list of the medicines you take. Where can you learn more? Go to http://dana-justo.info/. Enter N473 in the search box to learn more about \"Acute Low Back Pain: Exercises. \"  Current as of: November 29, 2017  Content Version: 11.7  © 4130-2199 [employer], [facility].  Care instructions adapted under license by [employer] (which disclaims liability or warranty for this information). If you have questions about a medical condition or this instruction, always ask your healthcare professional. [address] any warranty or liability for your use of this information. Sciatica: Care Instructions  Your Care Instructions    Sciatica (say \"bba-IA-mk-kuh\") is an irritation of one of the sciatic nerves, which come from the spinal cord in the lower back. The sciatic nerves and their branches extend down through the buttock to the foot. Sciatica can develop when an injured disc in the back presses against a spinal nerve root. Its main symptom is pain, numbness, or weakness that is often worse in the leg or foot than in the back. Sciatica often will improve and go away with time. Early treatment usually includes medicines and exercises to relieve pain. Follow-up care is a key part of your treatment and safety. Be sure to make and go to all appointments, and call your doctor if you are having problems. It's also a good idea to know your test results and keep a list of the medicines you take. How can you care for yourself at home? · Take pain medicines exactly as directed. ¨ If the doctor gave you a prescription medicine for pain, take it as prescribed. ¨ If you are not taking a prescription pain medicine, ask your doctor if you can take an over-the-counter medicine. · Use heat or ice to relieve pain. ¨ To apply heat, put a warm water bottle, heating pad set on low, or warm cloth on your back. Do not go to sleep with a heating pad on your skin. ¨ To use ice, put ice or a cold pack on the area for 10 to 20 minutes at a time. Put a thin cloth between the ice and your skin. · Avoid sitting if possible, unless it feels better than standing. · Alternate lying down with short walks.  Increase your walking distance as you are able to without making your symptoms worse. · Do not do anything that makes your symptoms worse. When should you call for help? Call 911 anytime you think you may need emergency care. For example, call if:    · You are unable to move a leg at all.   [facility] your doctor now or seek immediate medical care if:    · You have new or worse symptoms in your legs or buttocks. Symptoms may include:  ¨ Numbness or tingling. ¨ Weakness. ¨ Pain.     · You lose bladder or bowel control.    Watch closely for changes in your health, and be sure to contact your doctor if:    · You are not getting better as expected. Where can you learn more? Go to http://danaDiamond Mindjusto.info/. Enter [phone] in the search box to learn more about \"Sciatica: Care Instructions. \"  Current as of: November 29, 2017  Content Version: 11.7  © 2424-3879 [employer]. Care instructions adapted under license by [employer] (which disclaims liability or warranty for this information). If you have questions about a medical condition or this instruction, always ask your healthcare professional. [address] any warranty or liability for your use of this information. Sciatica: Exercises  Your Care Instructions  Here are some examples of typical rehabilitation exercises for your condition. Start each exercise slowly. Ease off the exercise if you start to have pain. Your doctor or physical therapist will tell you when you can start these exercises and which ones will work best for you. When you are not being active, find a comfortable position for rest. Some people are comfortable on the floor or a medium-firm bed with a small pillow under their head and another under their knees. Some people prefer to lie on their side with a pillow between their knees. Don't stay in one position for too long. Take short walks (10 to 20 minutes) every 2 to 3 hours. Avoid slopes, hills, and stairs until you feel better.  Walk only distances you can manage without pain, especially leg pain. How to do the exercises  Back stretches    7. Get down on your hands and knees on the floor. 8. Relax your head and allow it to droop. Round your back up toward the ceiling until you feel a nice stretch in your upper, middle, and lower back. Hold this stretch for as long as it feels comfortable, or about 15 to 30 seconds. 9. Return to the starting position with a flat back while you are on your hands and knees. 10. Let your back sway by pressing your stomach toward the floor. Lift your buttocks toward the ceiling. 11. Hold this position for 15 to 30 seconds. 12. Repeat 2 to 4 times. Follow-up care is a key part of your treatment and safety. Be sure to make and go to all appointments, and call your doctor if you are having problems. It's also a good idea to know your test results and keep a list of the medicines you take. Where can you learn more? Go to http://dana-justo.info/. Enter G840 in the search box to learn more about \"Sciatica: Exercises. \"  Current as of: November 29, 2017  Content Version: 11.7  © 9600-4259 [employer]. Care instructions adapted under license by [employer] (which disclaims liability or warranty for this information). If you have questions about a medical condition or this instruction, always ask your healthcare professional. [address] any warranty or liability for your use of this information.

## 2018-09-09 NOTE — ED PROVIDER NOTES
HPI Comments: 56-year-old female patient presents with reports of suspected sciatic nerve dysfunction. Patient reports a history of similar symptoms. She arrives describing pain in the lower lumbar spine with radiation to the left hip and thigh. This started when she was ambulating into the hospital to visit a family member. She states she stepped awkwardly and noticed pain suddenly. Eyes any fall or known injury. Denies any change in activity as of late. Reports several episodes of sciatic nerve dysfunction in the past with similar symptoms. There is no associated fever, chills, loss of bowel or bladder control. Patient denies numbness or tingling. Patient is a 71 y.o. female presenting with back pain. The history is provided by the patient and a relative. Back Pain This is a recurrent problem. The current episode started 1 to 2 hours ago. The problem has not changed since onset. The problem occurs constantly. Patient reports not work related injury. The pain is associated with no known injury. The pain is present in the lumbar spine and left side. The quality of the pain is described as stabbing. The pain is moderate. The symptoms are aggravated by twisting, bending and certain positions. Pertinent negatives include no chest pain, no fever, no numbness, no headaches, no abdominal pain, no dysuria and no weakness. She has tried nothing for the symptoms. Past Medical History:  
Diagnosis Date  Arthritis   
 osteo  Back pain   
 back problems  Cataracts, bilateral   
 Chronic pain   
 right shoulder x 2 years  Edema 2/26/2013  GERD (gastroesophageal reflux disease)   
 controlled with medication (denies hiatal hernia)  Gout 2/26/2013  Hyperglycemia 8/31/2015  Hyperlipidemia 2/26/2013  Hypertension   
 managed with meds  IBS (irritable bowel syndrome)  Ill-defined condition   
 hgba1c was 5.4 on 03/2016  Morbid obesity ([facility] [address] 03/22/2018  
 bmi=48  PONV (postoperative nausea and vomiting) PORTIA  
 Prediabetes 6/22/2016  Sciatica   
 right worse than left  Ulcer of ankle ([facility] [address] 2/26/2013  Vitamin D deficiency 2/19/2015 Past Surgical History:  
Procedure Laterality Date [address]  HX CATARACT REMOVAL Bilateral   
 2014 - Dr. Kera Leonard  HX CATARACT REMOVAL Bilateral 2016 Dr. Kera Leonard [address]  
 right inguinal hernia  HX HERNIA REPAIR  [id number]  
 umbilical hernia repair [address] ovaries remain [place]  HX ORTHOPAEDIC Right 1982  
 right elbow surgery replacement x 2  
 HX ROTATOR CUFF REPAIR Left 2011  HX SHOULDER REPLACEMENT Right 2013 [address]  TOTAL KNEE ARTHROPLASTY Left 2005 [place] ARTHROPLASTY Right 2012 Family History:  
Problem Relation Age of Onset  Cancer Mother   
  colon  Hypertension Mother  Stroke Mother  Heart Disease Mother CHF and a Pacemaker  Hypertension Father  Cancer Father   
  prostate and colon  Cancer Maternal Grandmother   
  colon  Breast Cancer Neg Hx Social History Social History  Marital status:  Spouse name: N/A  
 Number of children: N/A  
 Years of education: N/A Occupational History  Not on file. Social History Main Topics  Smoking status: Never Smoker  Smokeless tobacco: Never Used  Alcohol use No  
 Drug use: No  
 Sexual activity: Not Currently Other Topics Concern  Not on file Social History Narrative ALLERGIES: Adhesive; Clindamycin; Dilaudid [hydromorphone]; and Keflex [cephalexin] Review of Systems Constitutional: Negative for chills, diaphoresis and fever. HENT: Negative for congestion, sneezing and sore throat. Eyes: Negative for visual disturbance. Respiratory: Negative for cough, chest tightness, shortness of breath and wheezing. Cardiovascular: Negative for chest pain and leg swelling. Gastrointestinal: Negative for abdominal pain, blood in stool, diarrhea, nausea and vomiting. Endocrine: Negative for polyuria. Genitourinary: Negative for difficulty urinating, dysuria, flank pain, hematuria and urgency. Musculoskeletal: Positive for back pain. Negative for myalgias, neck pain and neck stiffness. Skin: Negative for color change and rash. Neurological: Negative for dizziness, syncope, speech difficulty, weakness, light-headedness, numbness and headaches. Psychiatric/Behavioral: Negative for behavioral problems. All other systems reviewed and are negative. Vitals:  
 09/08/18 1438 09/08/18 1642 BP: 144/66 140/67 Pulse: 82 80 Resp: 18 16 Temp: 98.2 °F (36.8 °C) SpO2: 97% 99% Weight: 128.4 kg (283 lb) Height: 5' 6\" (1.676 m) Physical Exam  
Constitutional: She is oriented to person, place, and time. She appears well-developed and well-nourished. No distress. Alert and oriented to person, place and time. No acute distress. Speaks in clear, fluent sentences. HENT:  
Head: Normocephalic and atraumatic. Right Ear: External ear normal.  
Nose: Nose normal.  
Eyes: Conjunctivae and EOM are normal. Pupils are equal, round, and reactive to light. Neck: Normal range of motion. Neck supple. No JVD present. No tracheal deviation present. Cardiovascular: Normal rate, regular rhythm, S1 normal, S2 normal, normal heart sounds and intact distal pulses. Exam reveals no gallop, no distant heart sounds and no friction rub. No murmur heard. Pulmonary/Chest: Effort normal and breath sounds normal. No accessory muscle usage or stridor. No tachypnea and no bradypnea. No respiratory distress. She has no decreased breath sounds. She has no wheezes. She has no rhonchi. She has no rales. She exhibits no tenderness. Abdominal: Soft. Normal appearance.  She exhibits no distension and no mass. There is no hepatosplenomegaly, splenomegaly or hepatomegaly. There is no tenderness. There is no rigidity, no rebound, no guarding, no CVA tenderness, no tenderness at McBurney's point and negative Birmingham's sign. Musculoskeletal: Normal range of motion. She exhibits no edema, tenderness or deformity. There is pain with palpation of the lower lumbar spine and buttocks on the left side. Weakly positive straight leg raise on the left. Neurological: She is alert and oriented to person, place, and time. She has normal strength. No cranial nerve deficit or sensory deficit. GCS eye subscore is 4. GCS verbal subscore is 5. GCS motor subscore is 6. Reflex Scores: 
     Patellar reflexes are 1+ on the right side and 1+ on the left side. DTRs reduced bilaterally but present and equal. 
 
No evidence of foot drop Skin: Skin is warm and dry. No rash noted. She is not diaphoretic. Psychiatric: She has a normal mood and affect. Her behavior is normal.  
  
 
MDM Number of Diagnoses or Management Options Sciatica of left side: new and requires workup Diagnosis management comments: Symptoms consistent with patient's known history of sciatic nerve dysfunction. She will be treated symptomatically and referred to her primary care provider. Patient reports no injury at this time, I do not feel that x-ray imaging is required. Amount and/or Complexity of Data Reviewed Tests in the medicine section of CPT®: ordered and reviewed Risk of Complications, Morbidity, and/or Mortality Presenting problems: moderate Diagnostic procedures: low Management options: moderate Patient Progress Patient progress: stable ED Course Procedures

## 2018-10-16 PROBLEM — E78.2 MIXED HYPERLIPIDEMIA: Status: ACTIVE | Noted: 2018-10-16

## 2018-11-07 ENCOUNTER — HOSPITAL ENCOUNTER (OUTPATIENT)
Dept: MAMMOGRAPHY | Age: 73
Discharge: HOME | End: 2018-11-07
Attending: INTERNAL MEDICINE
Payer: MEDICARE

## 2018-11-07 DIAGNOSIS — Z12.39 SCREENING FOR BREAST CANCER: ICD-10-CM

## 2018-11-07 PROCEDURE — 77067 SCR MAMMO BI INCL CAD: CPT

## 2019-05-02 ENCOUNTER — HOSPITAL ENCOUNTER (OUTPATIENT)
Dept: PHYSICAL THERAPY | Age: 74
Discharge: HOME | End: 2019-05-02
Payer: MEDICARE

## 2019-05-02 PROCEDURE — 97162 PT EVAL MOD COMPLEX 30 MIN: CPT

## 2019-05-02 NOTE — THERAPY EVALUATION
Rigo Cruz  : 1945  [place]  Secondary: [employer] Generic [address] Fabrizio Ferreira at [place] [id number] [facility], [address] [place] [address],[address], [place] [address].  [phone]   [phone]       OUTPATIENT PHYSICAL THERAPY:Initial Assessment 2019     ICD-10: Treatment Diagnosis: M48.062 spinal stenosis of lumbar region with neurogenic claudication, M43.10- degenerative spondylolisthesis  Precautions/Allergies:   Adhesive; Clindamycin; Dilaudid [hydromorphone]; and Keflex [cephalexin]   MD Orders: Evaluate and treat, modalities, core strengthening and stretching MEDICAL/REFERRING DIAGNOSIS:  Low back pain   DATE OF ONSET:   REFERRING PHYSICIAN: Ash LOZA*  RETURN PHYSICIAN APPOINTMENT: prn     INITIAL ASSESSMENT:  Ms. Berlin Robertson presents with low back pain and L hip pain. She is also complaining of L hip weakness. She is a good candidate for PT for the problems listed below. Aquatic therapy for down graded exercises is appropriate chose to accommodate for obesity, decreased general mobility and generalized debility. PROBLEM LIST (Impacting functional limitations):  1. Decreased Strength  2. Decreased Ambulation Ability/Technique  3. Decreased Balance  4. Increased Pain  5. Decreased Activity Tolerance  6. Decreased Flexibility/Joint Mobility  7. Decreased [place] with Home Exercise Program INTERVENTIONS PLANNED: (Treatment may consist of any combination of the following)  1. Home Exercise Program (HEP)  2. Manual Therapy  3. Therapeutic Exercise/Strengthening  4. aquatic therapy   TREATMENT PLAN:  Effective Dates: 2019 TO 2019 (90 days). Frequency/Duration: 2 times a week for 90 Day(s)  GOALS: (Goals have been discussed and agreed upon with patient.)  Short-Term Functional Goals: Time Frame: 4 weeks  1. Pt independent with HEP to address deficits. 2. Pt to perform 45 minutes aquatic therapy 2/week consistently.   3. Pt to report a decrease in her symptoms. 4. Assess balance and timed up and go. 5. Administer LEFS  Discharge Goals: Time Frame: 12 weeks  1. L hip flexor strength 4/5 to support gait activities. 2.  Pt to ascend and descend 3 steps with one railing. 3.  Pt able to lift L LE onto mat table or bed without use of UE's. 4.  Pt indpendent with HEP for maintenance of gains made in PT.  5. Improve LEFS by 10 points. OUTCOME MEASURE:   Tool Used: Modified Oswestry Low Back Pain Questionnaire  Score:  Initial: 7/50  Most Recent: X/50 (Date: -- )   Interpretation of Score: Each section is scored on a 0-5 scale, 5 representing the greatest disability. The scores of each section are added together for a total score of 50. MEDICAL NECESSITY:   · Patient is expected to demonstrate progress in strength, range of motion and balance to improve safety during ambulation and daily activities. .  REASON FOR SERVICES/OTHER COMMENTS:  · Patient continues to require skilled intervention due to as her decrease strength, pain and decrease ROM is affectin her ability to walk and be mobile. .  Total Duration:       Rehabilitation Potential For Stated Goals: Good  Regarding Williams Chaparrita Beltran's therapy, I certify that the treatment plan above will be carried out by a therapist or under their direction. Thank you for this referral,  Charles Hammonds, PT     Referring Physician Signature: Kassandra LOZA* _______________________________ Date _____________     PAIN/SUBJECTIVE:   Initial:   1/10 Post Session: 1/10   HISTORY:   History of Injury/Illness (Reason for Referral):  Ms Friddie Lanes presents with low back and L hip pain. She was having pain down her leg to her knee. She could not lift her L LE to get into the bed or car. That has improved but still having trouble lifting leg to get into car. Pt states that her leg is not hurting but her back is sore and feels tired.   She reports she has difficulty with stairs particular going up the stairs. Today rates pain 1/10. Pt states her pain is decreased by sitting in a recliner. Past Medical History/Comorbidities:   Ms. Fawn Lema  has a past medical history of Arthritis, Back pain, Cataracts, bilateral, Chronic pain, Edema (2/26/2013), GERD (gastroesophageal reflux disease), Gout (2/26/2013), Hyperglycemia (8/31/2015), Hyperlipidemia (2/26/2013), Hypertension, IBS (irritable bowel syndrome), Ill-defined condition, Mixed hyperlipidemia (10/16/2018), Morbid obesity ([facility] [address] (03/22/2018), PONV (postoperative nausea and vomiting), Prediabetes (6/22/2016), Sciatica, Ulcer of ankle ([facility] [address] (2/26/2013), and Vitamin D deficiency (2/19/2015). She also has no past medical history of Unspecified adverse effect of anesthesia. Ms. Fawn Lema  has a past surgical history that includes hx appendectomy (1954); hx tonsillectomy (1955); hx cataract removal (Bilateral); hx cataract removal (Bilateral, 2016); hx lap cholecystectomy (1993); hx hernia repair (1963); hx hernia repair ([id number]); hx hysterectomy (1986); hx orthopaedic (Right, 1982); pr total knee arthroplasty (Left, 2005); pr total knee arthroplasty (Right, 2012); hx shoulder replacement (Right, 2013); and hx rotator cuff repair (Left, 2011).'  Social History/Living Environment:     lives alone with no stairs. Prior Level of Function/Work/Activity:  Pt was independent but complains of being slow. Ambulatory/Rehab Services H2 Model Falls Risk Assessment   Risk Factors:       (5)  History of Recent Falls [w/in 3 months] Ability to Rise from Chair:       (1)  Pushes up, successful in one attempt   Falls Prevention Plan:       No modifications necessary   Total: (5 or greater = High Risk): 6   ©2010 [facility] [employer] [place]. All Rights Reserved. [facility] States Patent #[id number].  Federal Law prohibits the replication, distribution or use without written permission from [employer]   Current Medications:       Current Outpatient Medications:     lisinopril (PRINIVIL, ZESTRIL) 40 mg tablet, Take 0.5 Tabs by mouth two (2) times a day., Disp: , Rfl:     clobetasol (TEMOVATE) 0.05 % topical cream, Apply  to affected area two (2) times a day., Disp: 15 g, Rfl: 1    pravastatin (PRAVACHOL) 20 mg tablet, Take 1 Tab by mouth nightly., Disp: 90 Tab, Rfl: 3    omeprazole (PRILOSEC) 20 mg capsule, Take 1 Cap by mouth daily. , Disp: 90 Cap, Rfl: 3    allopurinol (ZYLOPRIM) 300 mg tablet, Take 1 Tab by mouth daily. (Patient taking differently: Take 300 mg by mouth daily. Indications: prevention of acute gout attack, morning), Disp: 90 Tab, Rfl: 3    lactobacillus rhamnosus gg 10 billion cell (CULTURELLE) 10 billion cell capsule, Take 1 Cap by mouth two (2) times a day., Disp: , Rfl:     acetaminophen (TYLENOL) 325 mg tablet, Take one tab by mouth with breakfast, lunch, dinner, and bedtime, Disp: , Rfl:     ascorbic acid (VITAMIN C) 500 mg tablet, Take one tablet daily to increase iron absorption, Disp: , Rfl:     Cholecalciferol, Vitamin D3, 1,000 unit cap, Take 2,000 Units by mouth daily. Indications: hold for 7 days prior to surgery per anesthesia protocol - instructed 3/22/18, Disp: , Rfl:     fish oil-dha-epa 1,200-144-216 mg cap, Take 1 Tab by mouth daily. Indications: hold for 7 days prior to surgery per anesthesia protocol - instructed 3/22/18, Disp: , Rfl:     GLUCOSAMINE HCL/CHONDR GRANADOS A NA (OSTEO BI-FLEX PO), Take  by mouth daily. Indications: hold for 7 days prior to surgery per anesthesia protocol - instructed 3/22/18, Disp: , Rfl:     aspirin 81 mg tablet, Take 81 mg by mouth daily.  Indications: prevention of transient ischemic attack, Disp: , Rfl:     multivitamin (ONE A DAY) tablet, Take 1 Tab by mouth every morning., Disp: , Rfl:    Date Last Reviewed:  5/2/2019   Number of Personal Factors/Comorbidities that affect the Plan of Care: 1-2: MODERATE COMPLEXITY   EXAMINATION:     Observation/Orthostatic Postural Assessment: Pt with lumbering broad based gait, increased base, slow khalida. Palpation: not today. ROM: Trunk     Flex- finger tips to shin     Ext - to neutral                       Strength:    L hip flex- 3-/5                   Functional Mobility:  Sit to/from Stand: use of hands with compensation. Step up/stairs- unable to step up without railing and use of UE. Difficulty getting L LE onto step. Sit- supine- uses UE to lift L LE onto table. Body Structures Involved:  1. Nerves  2. Joints  3. Muscles Body Functions Affected:  1. Neuromusculoskeletal  2. Movement Related Activities and Participation Affected:  1. General Tasks and Demands  2. Mobility  3.  Self Care   Number of elements (examined above) that affect the Plan of Care: 4+: HIGH COMPLEXITY   CLINICAL PRESENTATION:   Presentation: Evolving clinical presentation with unstable and unpredictable characteristics: HIGH COMPLEXITY   CLINICAL DECISION MAKING:   Use of outcome tool(s) and clinical judgement create a POC that gives a: Questionable prediction of patient's progress: MODERATE COMPLEXITY

## 2019-05-07 ENCOUNTER — HOSPITAL ENCOUNTER (OUTPATIENT)
Dept: PHYSICAL THERAPY | Age: 74
Discharge: HOME | End: 2019-05-07
Payer: MEDICARE

## 2019-05-07 PROCEDURE — 97113 AQUATIC THERAPY/EXERCISES: CPT

## 2019-05-07 NOTE — PROGRESS NOTES
Ailyn Leon  : 1945  [place] / Product Type: Medicare /  [address] at [facility] RICHARD MITCHELL  [address] [facility], [address] [place] [address],[address], [facility] [address]  [phone]   [phone]       OUTPATIENT PHYSICAL THERAPY: Daily Treatment Note 2019     ICD-10: Treatment Diagnosis: M48.062 spinal stenosis of lumbar region with neurogenic claudication, M43.10- degenerative spondylolisthesis  Precautions/Allergies:   Adhesive; Clindamycin; Dilaudid [hydromorphone]; and Keflex [cephalexin]   MD Orders: Evaluate and treat, modalities, core strengthening and stretching MEDICAL/REFERRING DIAGNOSIS:  Low back pain   DATE OF ONSET:   REFERRING PHYSICIAN: Timbo Street  RETURN PHYSICIAN APPOINTMENT: prn       Pre-treatment Symptoms/Complaints:  Pt states she is having some pain in her back today. Pain: Initial:   not rated Post Session:  Not rated    Medications Last Reviewed:  2019    Updated Objective Findings:   None Today     TREATMENT:     Aquatic Therapy (45 minutes): Aquatic treatment performed per flow grid for Decreased muscle strength, Decreased endurance, Decreased range of motion, Ease of movement and Low impact and reduced weight bearing activity. Cues provided for posture and balance. Aquatic Exercise Log       Date  19 Date   Date   Date   Date     Activity/ Exercise Parameters Parameters Parameters Parameters Parameters   Walking forward 4       Walking backward 4       Walking sideways 4         Marching 4         Goose Step 4         Tip toes          Heels          Lunges        Side step squats        LE Exercises 3. 5' holding rail         Hip Flex/Ext 10         Hip Abd/Add 10         Hip IR/ER          Calf raises          Knee Flex 10         Squats          Leg Circles 10/10         Step Ups 10       UE Exercises          Squeeze In          Push Down          Pull Down          Bicep/Tricep Rows/Press outs         Chi Positions          Trunk Rotation        Deep H2O/ Noodles 7' with assist and blue rings         Stabilization          Arms only          Legs only Bicycle 2 min       Cross   Country 1 min x2         Scissors 1 min x2         Crab walk        Lower abdominal   work           Cardio          Jogging        Lap   Swimming          Stretches          Hamstrings          Heelcords          Piriformis          Neck          [facility] Portal    Treatment/Session Summary:    · Response to Treatment:  Pt did well with aquatic therapy. She was a little apprehensive about the deeper water but did well. .  · Communication/Consultation:  None today  · Equipment provided today:  None today  · Recommendations/Intent for next treatment session: Next visit will focus on progressing exercises and gait. Effective Dates: 5/2/2019 TO 8/4/2019 (90 days). Frequency/Duration: 2 times a week for 90 Day(s)    Visit Count:  Visit count could not be calculated. Make sure you are using a visit which is associated with an episode.     Total Treatment Billable Duration:  45 minutes  PT Patient Time In/Time Out  Time In: 1130  Time Out: [address] [facility], [facility]    Future Appointments   Date Time Provider Tai Fisher   5/9/2019 12:15 PM Fredi Bullard, PTA [facility]   5/14/2019 10:45 AM Donge Juan Miguel, PTA [facility]   5/17/2019 11:00 AM [place] Sample, PT [facility]   5/21/2019 10:45 AM Margarie Junction, PTA [facility]   5/23/2019 10:45 AM Trisharie Juan Miguel, PTA [facility]   5/28/2019 10:45 AM Margarie [place], PTA [facility]   5/30/2019 10:45 AM Margarie Juan Miguel, PTA [facility]   6/4/2019 10:45 AM Trisharie Juan Miguel, PTA [facility]   6/7/2019 10:15 AM [place] Sample, PT [facility]   6/11/2019 10:45 AM Donge Juan Miguel, PTA [facility]   6/12/2019  9:10 AM [facility] LAB [facility]   6/13/2019 10:45 AM Aaliyah Oleary, PTA [facility]   6/19/2019 10:00 AM Karma Casillas MD [place] [facility]

## 2019-05-09 ENCOUNTER — HOSPITAL ENCOUNTER (OUTPATIENT)
Dept: PHYSICAL THERAPY | Age: 74
Discharge: HOME | End: 2019-05-09
Payer: MEDICARE

## 2019-05-09 PROCEDURE — 97113 AQUATIC THERAPY/EXERCISES: CPT

## 2019-05-09 NOTE — PROGRESS NOTES
Ector Smith  : 1945  [place] / Product Type: Medicare /  [address] at [facility] RICHARD MITCHELL  [address] [place] [address], [address], [place] [address].  [phone]   [phone]       OUTPATIENT PHYSICAL THERAPY: Daily Treatment Note 2019     ICD-10: Treatment Diagnosis: M48.062 spinal stenosis of lumbar region with neurogenic claudication, M43.10- degenerative spondylolisthesis  Precautions/Allergies:   Adhesive; Clindamycin; Dilaudid [hydromorphone]; and Keflex [cephalexin]   MD Orders: Evaluate and treat, modalities, core strengthening and stretching MEDICAL/REFERRING DIAGNOSIS:  Low back pain   DATE OF ONSET:   REFERRING PHYSICIAN: Geronimo Watkins  RETURN PHYSICIAN APPOINTMENT: prn       Pre-treatment Symptoms/Complaints:  Pt states she was slightly sore from last PT session. She felt better once she was up and moving the next day. Pain: Initial:   not rated Post Session:  Not rated    Medications Last Reviewed:  2019    Updated Objective Findings:   None Today     TREATMENT:     Aquatic Therapy (45 minutes): Aquatic treatment performed per flow grid for Decreased muscle strength, Decreased endurance, Decreased range of motion, Ease of movement and Low impact and reduced weight bearing activity. Cues provided for posture and balance. Aquatic Exercise Log       Date  19 Date  19 Date   Date   Date     Activity/ Exercise Parameters Parameters Parameters Parameters Parameters   Walking forward 4 6      Walking backward 4 6      Walking sideways 4 6        Marching 4 6        Goose Step 4 6        Tip toes  3        Heels  3        Lunges  Long step 6      Side step squats        LE Exercises 3. 5' holding rail 3. 5' holding rail        Hip Flex/Ext 10 10        Hip Abd/Add 10 10        Hip IR/ER          Calf raises          Knee Flex 10 10        Squats          Leg Circles 10/10 10/10        Step Ups 10 10      UE Exercises          Squeeze In          Push Down          Pull Down          Bicep/Tricep        Rows/Press outs         Chi Positions          Trunk Rotation        Deep H2O/ Noodles 7' with assist and blue rings 7' with assist and blue rings        Stabilization          Arms only          Legs only Bicycle 2 min Bicycle 2 min      Cross   Country 1 min x2 2 min        Scissors 1 min x2 2 min        Crab walk        Lower abdominal   work   DKTC 2 min        Cardio          Jogging        Lap   Swimming          Stretches          Hamstrings          Heelcords          Piriformis          Neck          [employer] Portal    Treatment/Session Summary:    · Response to Treatment:  Pt did much better in the water today. .  · Communication/Consultation:  None today  · Equipment provided today:  None today  · Recommendations/Intent for next treatment session: Next visit will focus on progressing exercises and gait. Effective Dates: 5/2/2019 TO 8/4/2019 (90 days). Frequency/Duration: 2 times a week for 90 Day(s)    Visit Count:  Visit count could not be calculated. Make sure you are using a visit which is associated with an episode.     Total Treatment Billable Duration:  45 minutes  PT Patient Time In/Time Out  Time In: 1200  Time Out: [address], PTA    Future Appointments   Date Time Provider Tai Fisher   5/14/2019 10:45 AM Fredi Bullard, PTA [facility]   5/17/2019 11:00 AM Stan Sample, PT [facility]   5/21/2019 10:45 AM Fredi Niota, PTA [facility]   5/23/2019 10:45 AM Fredi Juan Miguel, PTA [facility]   5/28/2019 10:45 AM Fredi Juan Miguel, PTA [facility]   5/30/2019 10:45 AM Donge Juan Miguel, PTA [facility]   6/4/2019 10:45 AM Fredi Niota, PTA [facility]   6/7/2019 10:15 AM Stan Sample, PT [facility]   6/11/2019 10:45 AM Fredi Juan Miguel, PTA [facility]   6/12/2019  9:10 AM [facility]   6/13/2019 10:45 AM Nigel Valles PTA [facility]   6/19/2019 10:00 AM Leonard Barth MD [place] [facility]

## 2019-05-14 ENCOUNTER — HOSPITAL ENCOUNTER (OUTPATIENT)
Dept: PHYSICAL THERAPY | Age: 74
Discharge: HOME | End: 2019-05-14
Payer: MEDICARE

## 2019-05-14 PROCEDURE — 97113 AQUATIC THERAPY/EXERCISES: CPT

## 2019-05-14 NOTE — PROGRESS NOTES
Lowell Clemens  : 1945  [place] / Product Type: Medicare /  [address] at [facility] RICHARD MITCHELL  1101 [facility], [address], [address] [facility]  [phone]   [phone]       OUTPATIENT PHYSICAL THERAPY: Daily Treatment Note 2019     ICD-10: Treatment Diagnosis: M48.062 spinal stenosis of lumbar region with neurogenic claudication, M43.10- degenerative spondylolisthesis  Precautions/Allergies:   Adhesive; Clindamycin; Dilaudid [hydromorphone]; and Keflex [cephalexin]   MD Orders: Evaluate and treat, modalities, core strengthening and stretching MEDICAL/REFERRING DIAGNOSIS:  Low back pain   DATE OF ONSET:   REFERRING PHYSICIAN: Archana Seymour*  RETURN PHYSICIAN APPOINTMENT: prn       Pre-treatment Symptoms/Complaints:  Pt with no new complaints today. .    Pain: Initial:   not rated Post Session:  Not rated    Medications Last Reviewed:  2019    Updated Objective Findings:   None Today     TREATMENT:     Aquatic Therapy (45 minutes): Aquatic treatment performed per flow grid for Decreased muscle strength, Decreased endurance, Decreased range of motion, Ease of movement and Low impact and reduced weight bearing activity. Cues provided for posture and balance. Aquatic Exercise Log       Date  19 Date  19 Date  19 Date   Date     Activity/ Exercise Parameters Parameters Parameters Parameters Parameters   Walking forward 4 6 6     Walking backward 4 6 6     Walking sideways 4 6 6       Marching 4 6 6       Goose Step 4 6 6       Tip toes  3 3       Heels  3 3       Lunges  Long step 6 Long step 6     Side step squats        LE Exercises 3. 5' holding rail 3. 5' holding rail 3. 5' holding rail        Hip Flex/Ext 10 10 15       Hip Abd/Add 10 10 15       Hip IR/ER          Calf raises          Knee Flex 10 10 15       Squats          Leg Circles 10/10 10/10 15/15       Step Ups 10 10 15     UE Exercises          Squeeze In          Push Down          Pull Down          Bicep/Tricep        Rows/Press outs         Chi Positions          Trunk Rotation        Deep H2O/ Noodles 7' with assist and blue rings 7' with assist and blue rings 7' with assist and blue rings       Stabilization          Arms only          Legs only Bicycle 2 min Bicycle 2 min Bicycle 2 min     Cross   Country 1 min x2 2 min 2 min       Scissors 1 min x2 2 min 2 min       Crab walk        Lower abdominal   work   DKTC 2 min DTKC 2 min       Cardio          Jogging        Lap   Swimming          Stretches          Hamstrings          Heelcords          Piriformis          Neck          [facility] Portal    Treatment/Session Summary:    · Response to Treatment:  Continiues to do well in the water. she is more comfortable with deeper water but still requires assist. .  · Communication/Consultation:  None today  · Equipment provided today:  None today  · Recommendations/Intent for next treatment session: Next visit will focus on progressing exercises and gait. Effective Dates: 5/2/2019 TO 8/4/2019 (90 days). Frequency/Duration: 2 times a week for 90 Day(s)    Visit Count:  Visit count could not be calculated. Make sure you are using a visit which is associated with an episode.     Total Treatment Billable Duration:  45 minutes  PT Patient Time In/Time Out  Time In: 1030  Time Out: [address], PTA    Future Appointments   Date Time Provider Tai Fishre   5/17/2019 11:00 AM [facility]undrea Jay, [facility]   5/21/2019 10:45 AM Arlyce Crimes, PTA [facility]   5/23/2019 10:45 AM Arlyce Crimes, PTA [facility]   5/28/2019 10:45 AM Arlyce Crimes, PTA [facility]   5/30/2019 10:45 AM Arlyce Crimes, PTA [facility]   6/4/2019 10:45 AM Arlyce Crimes, PTA [facility]   6/7/2019 10:15 AM Aundrea Jay, PT [facility]   6/11/2019 10:45 AM [facility]   6/12/2019  9:10 AM 10 [facility] LAB [facility] 10 [facility]   6/13/2019 10:45 AM Marcellus Gutierrez PTA [facility]   6/19/2019 10:00 AM Keyon Lee MD [place] [facility] 10 [facility]

## 2019-05-17 ENCOUNTER — HOSPITAL ENCOUNTER (OUTPATIENT)
Dept: PHYSICAL THERAPY | Age: 74
Discharge: HOME | End: 2019-05-17
Payer: MEDICARE

## 2019-05-17 PROCEDURE — 97113 AQUATIC THERAPY/EXERCISES: CPT

## 2019-05-17 NOTE — PROGRESS NOTES
Prince Rajput  : 1945  [place] / Product Type: Medicare /  [address] at [facility] RICHARD MITCHELL  1101 [facility], [address], [address] [facility]  [phone]   [phone]       OUTPATIENT PHYSICAL THERAPY: Daily Treatment Note 2019     ICD-10: Treatment Diagnosis: M48.062 spinal stenosis of lumbar region with neurogenic claudication, M43.10- degenerative spondylolisthesis  Precautions/Allergies:   Adhesive; Clindamycin; Dilaudid [hydromorphone]; and Keflex [cephalexin]   MD Orders: Evaluate and treat, modalities, core strengthening and stretching MEDICAL/REFERRING DIAGNOSIS:  Low back pain   DATE OF ONSET:   REFERRING PHYSICIAN: Ramo Rendon*  RETURN PHYSICIAN APPOINTMENT: prn       Pre-treatment Symptoms/Complaints:  Pt with no new complaints today. .    Pain: Initial:   not rated Post Session:  Not rated    Medications Last Reviewed:  2019    Updated Objective Findings:   None Today     TREATMENT:     Aquatic Therapy (45 minutes): Aquatic treatment performed per flow grid for Decreased muscle strength, Decreased endurance, Decreased range of motion, Ease of movement and Low impact and reduced weight bearing activity. Cues provided for posture and balance. Aquatic Exercise Log       Date  19 Date  19 Date  19 Date  5/17/19 Date     Activity/ Exercise Parameters Parameters Parameters Parameters Parameters   Walking forward 4 6 6 6    Walking backward 4 6 6 6    Walking sideways 4 6 6 6      Marching 4 6 6 6      Goose Step 4 6 6 6      Tip toes  3 3 3      Heels  3 3 3      Lunges  Long step 6 Long step 6 Long step 6    Side step squats        LE Exercises 3. 5' holding rail 3. 5' holding rail 3. 5' holding rail  3.5 holding to rail      Hip Flex/Ext 10 10 15 15      Hip Abd/Add 10 10 15 15      Hip IR/ER          Calf raises          Knee Flex 10 10 15 15 with overstretch      Squats Leg Circles 10/10 10/10 15/15 15/15      Step Ups 10 10 15 15    UE Exercises          Squeeze In          Push Down          Pull Down          Bicep/Tricep        Rows/Press outs         Chi Positions          Trunk Rotation        Deep H2O/ Noodles 7' with assist and blue rings 7' with assist and blue rings 7' with assist and blue rings 7' with assist and noodle      Stabilization          Arms only          Legs only Bicycle 2 min Bicycle 2 min Bicycle 2 min Jog 2 min    Cross   Country 1 min x2 2 min 2 min 2 min      Scissors 1 min x2 2 min 2 min 2 min      Crab walk        Lower abdominal   work   DKTC 2 min DTKC 2 min       Cardio          Jogging        Lap   Swimming          Stretches          Hamstrings          Heelcords          Piriformis          Neck          [employer] Portal    Treatment/Session Summary:    · Response to Treatment:  worked on letting go of railing in shallow with walking and hip flex and quad stretching. Also, focus on keeping feet straight. .  · Communication/Consultation:  None today  · Equipment provided today:  None today  · Recommendations/Intent for next treatment session: Next visit will focus on progressing exercises and gait. Effective Dates: 5/2/2019 TO 8/4/2019 (90 days).   Frequency/Duration: 2 times a week for 90 Day(s)    Visit Count:  5  Total Treatment Billable Duration:  45 minutes  PT Patient Time In/Time Out  Time In: 1115  Time Out: [address] [facility], PT    Future Appointments   Date Time Provider Tai Fisher   5/21/2019 10:45 AM Fredi Bullard PTA [facility]   5/23/2019 10:45 AM Fredi Bullard PTA [facility]   5/28/2019 10:45 AM Fredi Bullard PTA [facility]   5/30/2019 10:45 AM Fredi Bullard PTA [facility]   6/4/2019 10:45 AM Fredi Bullard PTA [facility]   6/7/2019 10:15 AM Stan Suazo PT [facility]   6/11/2019 10:45 AM Fredi Bullard PTA [facility]   6/12/2019 9:10 AM [facility] LAB [facility]   6/13/2019 10:45 AM Erna Gallardo PTA [facility]   6/19/2019 10:00 AM Belle Barrera MD [place] [facility]

## 2019-05-21 ENCOUNTER — HOSPITAL ENCOUNTER (OUTPATIENT)
Dept: PHYSICAL THERAPY | Age: 74
Discharge: HOME | End: 2019-05-21
Payer: MEDICARE

## 2019-05-21 PROCEDURE — 97113 AQUATIC THERAPY/EXERCISES: CPT

## 2019-05-21 NOTE — PROGRESS NOTES
Alec Rueda  : 1945  [place] / Product Type: Medicare /  [address] at [facility] RICHARD MITCHELL  [address] [place] [address], [address], [place] [address].  [phone]   [phone]       OUTPATIENT PHYSICAL THERAPY: Daily Treatment Note 2019     ICD-10: Treatment Diagnosis: M48.062 spinal stenosis of lumbar region with neurogenic claudication, M43.10- degenerative spondylolisthesis  Precautions/Allergies:   Adhesive; Clindamycin; Dilaudid [hydromorphone]; and Keflex [cephalexin]   MD Orders: Evaluate and treat, modalities, core strengthening and stretching MEDICAL/REFERRING DIAGNOSIS:  Low back pain   DATE OF ONSET:   REFERRING PHYSICIAN: Lane Tafoya  RETURN PHYSICIAN APPOINTMENT: prn       Pre-treatment Symptoms/Complaints:  Pt states she was a little sore from the last PT session. Pain: Initial:   not rated Post Session:  Not rated    Medications Last Reviewed:  2019    Updated Objective Findings:   None Today     TREATMENT:     Aquatic Therapy (45 minutes): Aquatic treatment performed per flow grid for Decreased muscle strength, Decreased endurance, Decreased range of motion, Ease of movement and Low impact and reduced weight bearing activity. Cues provided for posture and balance. Aquatic Exercise Log       Date  19 Date  19 Date  19 Date  19 Date  19   Activity/ Exercise Parameters Parameters Parameters Parameters Parameters   Walking forward 4 6 6 6 6   Walking backward 4 6 6 6 6   Walking sideways 4 6 6 6 6     Marching 4 6 6 6 6     Goose Step 4 6 6 6 6     Tip toes  3 3 3 3     Heels  3 3 3 3     Lunges  Long step 6 Long step 6 Long step 6 Long step 6   Side step squats        LE Exercises 3. 5' holding rail 3. 5' holding rail 3. 5' holding rail  3.5 holding to rail 3. 5' holding rail      Hip Flex/Ext 10 10 15 15 15     Hip Abd/Add 10 10 15 15 15     Hip IR/ER          Calf raises Knee Flex 10 10 15 15 with overstretch 15 with overstretch      Squats          Leg Circles 10/10 10/10 15/15 15/15 15/15     Step Ups 10 10 15 15 15   UE Exercises          Squeeze In          Push Down          Pull Down          Bicep/Tricep        Rows/Press outs         Chi Positions          Trunk Rotation        Deep H2O/ Noodles 7' with assist and blue rings 7' with assist and blue rings 7' with assist and blue rings 7' with assist and noodle 7' with assist and noodle      Stabilization          Arms only          Legs only Bicycle 2 min Bicycle 2 min Bicycle 2 min Jog 2 min Bicycle 2 min   Cross   Country 1 min x2 2 min 2 min 2 min 2 min     Scissors 1 min x2 2 min 2 min 2 min 2 min     Crab walk        Lower abdominal   work   DKTC 2 min DTKC 2 min  DTKC 2 min     Cardio          Jogging        Lap   Swimming          Stretches          Hamstrings          Heelcords          Piriformis          Neck          [facility] Portal    Treatment/Session Summary:    · Response to Treatment:  Worked with pt on not holding onto rail while walking. Stretch of hip flexors and quads. · Communication/Consultation:  None today  · Equipment provided today:  None today  · Recommendations/Intent for next treatment session: Next visit will focus on progressing exercises and gait. Effective Dates: 5/2/2019 TO 8/4/2019 (90 days).   Frequency/Duration: 2 times a week for 90 Day(s)    Visit Count:  5  Total Treatment Billable Duration:  45 minutes  PT Patient Time In/Time Out  Time In: 3874  Time Out: [address] [place], PTA    Future Appointments   Date Time Provider Tai Fisher   5/28/2019 10:45 AM Nigel Valles PTA [facility]   5/30/2019 10:45 AM Nigel Valles PTA [facility]   6/4/2019 10:45 AM Nigel Valles PTA [facility]   6/7/2019 10:15 AM Bon Urban PT [facility]   6/11/2019 10:45 AM Nigel Valles PTA [facility]   6/12/2019  9:10 AM [facility] LAB [facility]   6/13/2019 10:45 AM Marcellus Gutierrez PTA [facility]   6/19/2019 10:00 AM Keyon Lee MD [place] [facility]

## 2019-05-23 ENCOUNTER — HOSPITAL ENCOUNTER (OUTPATIENT)
Dept: PHYSICAL THERAPY | Age: 74
Discharge: HOME | End: 2019-05-23
Payer: MEDICARE

## 2019-05-23 NOTE — PROGRESS NOTES
Aime Arlene  : 1945  [place] / Product Type: Medicare /  [address]  at [facility] RICHARD MITCHELL  [address] [place] [address], [address], [place] [address].  [phone]   [phone]       OUTPATIENT PHYSICAL THERAPY: Cancellation 2019     ICD-10: Treatment Diagnosis: M48.062 spinal stenosis of lumbar region with neurogenic claudication, M43.10- degenerative spondylolisthesis  Precautions/Allergies:   Adhesive; Clindamycin; Dilaudid [hydromorphone]; and Keflex [cephalexin]   MD Orders: Evaluate and treat, modalities, core strengthening and stretching MEDICAL/REFERRING DIAGNOSIS:  Low back pain   DATE OF ONSET:   REFERRING PHYSICIAN: Navid Barrientos*  RETURN PHYSICIAN APPOINTMENT: prn     Pt came to PT this date and stated that she did not feel well. She attempted to change clothes but became more nauseous. Pt cancelled appointment today. Will follow up with her on next visit.      Annabelle Jo, PTA

## 2019-05-28 ENCOUNTER — HOSPITAL ENCOUNTER (OUTPATIENT)
Dept: PHYSICAL THERAPY | Age: 74
Discharge: HOME | End: 2019-05-28
Payer: MEDICARE

## 2019-05-28 PROCEDURE — 97113 AQUATIC THERAPY/EXERCISES: CPT

## 2019-05-28 NOTE — PROGRESS NOTES
Gonsalo De Paz  : 1945  [place] / Product Type: Medicare /  [address]  at [facility] RICHARD MITCHELL  [address] [facility], [address], [place] [address].  [phone]   [phone]       OUTPATIENT PHYSICAL THERAPY: Daily Treatment Note 2019     ICD-10: Treatment Diagnosis: M48.062 spinal stenosis of lumbar region with neurogenic claudication, M43.10- degenerative spondylolisthesis  Precautions/Allergies:   Adhesive; Clindamycin; Dilaudid [hydromorphone]; and Keflex [cephalexin]   MD Orders: Evaluate and treat, modalities, core strengthening and stretching MEDICAL/REFERRING DIAGNOSIS:  Low back pain   DATE OF ONSET:   REFERRING PHYSICIAN: Slim Freed  RETURN PHYSICIAN APPOINTMENT: prn       Pre-treatment Symptoms/Complaints:  Pt states she feels much better today. She is still having some indigestion issues. Pain: Initial:   not rated Post Session:  Not rated    Medications Last Reviewed:  2019    Updated Objective Findings:   None Today     TREATMENT:     Aquatic Therapy (45 minutes): Aquatic treatment performed per flow grid for Decreased muscle strength, Decreased endurance, Decreased range of motion, Ease of movement and Low impact and reduced weight bearing activity. Cues provided for posture and balance. Aquatic Exercise Log       Date  19 Date  19 Date  19 Date  19 Date  19 Date  19   Activity/ Exercise Parameters Parameters Parameters Parameters Parameters    Walking forward 4 6 6 6 6 6   Walking backward 4 6 6 6 6 6   Walking sideways 4 6 6 6 6 6     Marching 4 6 6 6 6 6     Goose Step 4 6 6 6 6 6     Tip toes  3 3 3 3 3     Heels  3 3 3 3 3     Lunges  Long step 6 Long step 6 Long step 6 Long step 6 Long step 6   Side step squats         LE Exercises 3. 5' holding rail 3. 5' holding rail 3. 5' holding rail  3.5 holding to rail 3. 5' holding rail  3. 5' holding rail     Hip Flex/Ext 10 10 15 15 15 15 with purple noodle     Hip Abd/Add 10 10 15 15 15 15 with purple noodle     Hip IR/ER           Calf raises           Knee Flex 10 10 15 15 with overstretch 15 with overstretch  15 with 3 overstretch      Squats           Leg Circles 10/10 10/10 15/15 15/15 15/15 15/15     Step Ups 10 10 15 15 15 15   UE Exercises           Squeeze In           Push Down           Pull Down           Bicep/Tricep         Rows/Press outs          Chi Positions           Trunk Rotation         Deep H2O/ Noodles 7' with assist and blue rings 7' with assist and blue rings 7' with assist and blue rings 7' with assist and noodle 7' with assist and noodle  7' with assist and blue rings     Stabilization           Arms only           Legs only Bicycle 2 min Bicycle 2 min Bicycle 2 min Jog 2 min Bicycle 2 min Bicycle 2 min   Cross   Country 1 min x2 2 min 2 min 2 min 2 min 2 min     Scissors 1 min x2 2 min 2 min 2 min 2 min 2 min     Crab walk      Reverse clam 1 min  Clam 1 min   Lower abdominal   work   DKTC 2 min DTKC 2 min  DTKC 2 min DKTC 2 min     Cardio           Jogging         Lap   Swimming           Stretches           Hamstrings           Heelcords           Piriformis           Neck           [facility] Portal    Treatment/Session Summary:    · Response to Treatment:  Pt continues to do better with balance in the 3.5' area. .  · Communication/Consultation:  None today  · Equipment provided today:  None today  · Recommendations/Intent for next treatment session: Next visit will focus on progressing exercises and gait. Effective Dates: 5/2/2019 TO 8/4/2019 (90 days).   Frequency/Duration: 2 times a week for 90 Day(s)    Visit Count:  5  Total Treatment Billable Duration:  45 minutes  PT Patient Time In/Time Out  Time In: 0721  Time Out: [address], PTA    Future Appointments   Date Time Provider Tai Fisher   5/30/2019 10:45 AM Simona Grande PTA [facility]   6/4/2019 10:45 AM MAKSIM Bell [facility]   6/7/2019 10:15 AM BRIDGET Núñez [facility]   6/11/2019 10:45 [facility]   6/12/2019  9:10 AM [facility] LAB [facility]   6/13/2019 10:45 AM MAKSIM Bell [facility]   6/19/2019 10:00 AM Noreen Colunga MD [place] [facility]

## 2019-05-30 ENCOUNTER — HOSPITAL ENCOUNTER (OUTPATIENT)
Dept: PHYSICAL THERAPY | Age: 74
Discharge: HOME | End: 2019-05-30
Payer: MEDICARE

## 2019-05-30 PROCEDURE — 97113 AQUATIC THERAPY/EXERCISES: CPT

## 2019-05-30 NOTE — PROGRESS NOTES
Stephanie Colton  : 1945  [place] / Product Type: Medicare /  [address] at [facility] RICHARD MITCHELL  [address] [place] [address], [address], [place] [address].  [phone]   [phone]       OUTPATIENT PHYSICAL THERAPY: Daily Treatment Note 2019     ICD-10: Treatment Diagnosis: M48.062 spinal stenosis of lumbar region with neurogenic claudication, M43.10- degenerative spondylolisthesis  Precautions/Allergies:   Adhesive; Clindamycin; Dilaudid [hydromorphone]; and Keflex [cephalexin]   MD Orders: Evaluate and treat, modalities, core strengthening and stretching MEDICAL/REFERRING DIAGNOSIS:  Low back pain   DATE OF ONSET:   REFERRING PHYSICIAN: Meredith Iqbal*  RETURN PHYSICIAN APPOINTMENT: prn       Pre-treatment Symptoms/Complaints:  Pt with no new complaints today. Pain: Initial:   not rated Post Session:  Not rated    Medications Last Reviewed:  2019    Updated Objective Findings:   None Today     TREATMENT:     Aquatic Therapy (45 minutes): Aquatic treatment performed per flow grid for Decreased muscle strength, Decreased endurance, Decreased range of motion, Ease of movement and Low impact and reduced weight bearing activity. Cues provided for posture and balance. Aquatic Exercise Log       Date  19 Date  19   Activity/ Exercise     Walking forward 6 6   Walking backward 6 6   Walking sideways 6 6     Marching 6 6     Goose Step 6 6     Tip toes 3 3     Heels 3 3     Lunges Long step 6 Long step 6   Side step squats     LE Exercises 3. 5' holding rail 3. 5' holding rail     Hip Flex/Ext 15 with purple noodle 20 with purple noodle     Hip Abd/Add 15 with purple noodle 20 with purple noodle     Hip IR/ER       Calf raises       Knee Flex 15 with 3 overstretch  15     Squats  15     Leg Circles 15/15 15/15     Step Ups 15 15   UE Exercises       Squeeze In       Push Down       Pull Down       Bicep/Tricep Rows/Press outs      Chi Positions       Trunk Rotation     Deep H2O/ Noodles 7' with assist and blue rings 7' with assist and blue rings     Stabilization       Arms only       Legs only Bicycle 2 min Bicycle 3 min   Cross   Country 2 min 3 min     Scissors 2 min 3 min     Crab walk Reverse clam 1 min  Clam 1 min    Lower abdominal   work  DKTC 2 min DKTC 3 min     Cardio       Jogging     Lap   Swimming       Stretches       Hamstrings       Heelcords       Piriformis       Neck       [facility] Portal    Treatment/Session Summary:    · Response to Treatment:  Pt continues to do well mobility and exercises. · Communication/Consultation:  None today  · Equipment provided today:  None today  · Recommendations/Intent for next treatment session: Next visit will focus on progressing exercises and gait. Effective Dates: 5/2/2019 TO 8/4/2019 (90 days).   Frequency/Duration: 2 times a week for 90 Day(s)    Visit Count:  5  Total Treatment Billable Duration:  45 minutes  PT Patient Time In/Time Out  Time In: 4696  Time Out: [address], PTA    Future Appointments   Date Time Provider Tai Fisher   6/4/2019 10:45 AM MAKSIM Colon [facility]   6/7/2019 10:15 AM Michelle Viveros, PT [facility]   6/11/2019 10:45 AM MAKSIM Colon [facility]   6/12/2019  9:10 AM [facility]   6/13/2019 10:45 AM MAKSIM Colon [facility]   6/19/2019 10:00 AM Sandra Diego MD [place] [facility]

## 2019-06-04 ENCOUNTER — HOSPITAL ENCOUNTER (OUTPATIENT)
Dept: PHYSICAL THERAPY | Age: 74
Discharge: HOME | End: 2019-06-04
Payer: MEDICARE

## 2019-06-04 PROCEDURE — 97113 AQUATIC THERAPY/EXERCISES: CPT

## 2019-06-04 NOTE — PROGRESS NOTES
Felicita Carrascocassy  : 1945  [place] / Product Type: Medicare /  [address] at [facility] RICHARD MITCHELL  [facility], [address], [address] [place] [address]  [phone]   [phone]       OUTPATIENT PHYSICAL THERAPY: Daily Treatment Note 2019     ICD-10: Treatment Diagnosis: M48.062 spinal stenosis of lumbar region with neurogenic claudication, M43.10- degenerative spondylolisthesis  Precautions/Allergies:   Adhesive; Clindamycin; Dilaudid [hydromorphone]; and Keflex [cephalexin]   MD Orders: Evaluate and treat, modalities, core strengthening and stretching MEDICAL/REFERRING DIAGNOSIS:  Low back pain   DATE OF ONSET:   REFERRING PHYSICIAN: Jeaneth Evans*  RETURN PHYSICIAN APPOINTMENT: prn       Pre-treatment Symptoms/Complaints:  Pt with no new complaints today. Pain: Initial:   not rated Post Session:  Not rated    Medications Last Reviewed:  2019    Updated Objective Findings:   None Today     TREATMENT:     Aquatic Therapy (45 minutes): Aquatic treatment performed per flow grid for Decreased muscle strength, Decreased endurance, Decreased range of motion, Ease of movement and Low impact and reduced weight bearing activity. Cues provided for posture and balance. Aquatic Exercise Log       Date  19 Date  19 Date  19   Activity/ Exercise      Walking forward 6 6 6   Walking backward 6 6 6   Walking sideways 6 6 6     Marching 6 6 6     Goose Step 6 6 6     Tip toes 3 3 3     Heels 3 3 3     Lunges Long step 6 Long step 6 Long step 6   Side step squats      LE Exercises 3. 5' holding rail 3. 5' holding rail 3. 5' holding rail     Hip Flex/Ext 15 with purple noodle 20 with purple noodle 20 with purple noodle     Hip Abd/Add 15 with purple noodle 20 with purple noodle 20 with purple noodle     Hip IR/ER        Calf raises        Knee Flex 15 with 3 overstretch  15 15 with 3 overstretch     Squats  15 Leg Circles 15/15 15/15 15/15     Step Ups 15 15 15   UE Exercises        Squeeze In        Push Down        Pull Down        Bicep/Tricep      Rows/Press outs       Chi Positions        Trunk Rotation      Deep H2O/ Noodles 7' with assist and blue rings 7' with assist and blue rings 7' with assist and blue rings     Stabilization   Combo 3 min     Arms only        Legs only Bicycle 2 min Bicycle 3 min Bicycle 3 min   Cross   Country 2 min 3 min 3 min     Scissors 2 min 3 min 3 min     Crab walk Reverse clam 1 min  Clam 1 min     Lower abdominal   work  DKTC 2 min DKTC 3 min DKTC 3 min     Cardio        Jogging      Lap   Swimming        Stretches        Hamstrings        Heelcords        Piriformis        Neck        [facility] Portal    Treatment/Session Summary:    · Response to Treatment:   Pt continues to do well with mobility and exercises. · Communication/Consultation:  None today  · Equipment provided today:  None today  · Recommendations/Intent for next treatment session: Next visit will focus on progressing exercises and gait. Effective Dates: 5/2/2019 TO 8/4/2019 (90 days).   Frequency/Duration: 2 times a week for 90 Day(s)    Visit Count:  5  Total Treatment Billable Duration:  45 minutes  PT Patient Time In/Time Out  Time In: 8189  Time Out: [address], PTA    Future Appointments   Date Time Provider Tai Fisher   6/7/2019 10:15 AM BRIDGET [facility]   6/11/2019 10:45 AM Jenelle Corcoran PTA [facility]   6/12/2019  9:10 AM [facility] LAB [facility]   6/13/2019 10:45 AM Jenelle Corcoran, PTA [facility]   6/18/2019  8:30 AM Jenelle Corcoran, PTA [facility]   6/19/2019 10:00 AM Chavo Motley MD [facility]   6/20/2019 10:00 AM Jenelle Corcoran, PTA [facility]   6/25/2019 11:30 AM Jenelle Corcoran, PTA [facility]   6/27/2019 10:00 AM Jenelle Corcoran PTA [facility]   7/3/2019 11:15 AM Cammy Rush PT [facility]   7/5/2019 10:45 AM Doris Bardales, PT [facility]   7/8/2019 10:15 AM Doris Bardales, PT [facility]   7/11/2019 10:45 AM Gerrianne Ham, PTA [facility]   7/16/2019 10:45 AM Gerrianne Ham, PTA [facility]   7/18/2019 10:45 AM Gerrianne Ham, PTA [facility]   7/23/2019 10:45 AM Gerrianne Ham, PTA [facility]   7/25/2019 10:45 AM Gerrianne Ham, PTA [facility]   7/30/2019 10:45 AM Gerrianne Ham, PTA [facility]   8/1/2019 10:45 AM Gloria Nicholas, PTA [facility]

## 2019-06-07 ENCOUNTER — HOSPITAL ENCOUNTER (OUTPATIENT)
Dept: PHYSICAL THERAPY | Age: 74
Discharge: HOME | End: 2019-06-07
Payer: MEDICARE

## 2019-06-07 PROCEDURE — 97113 AQUATIC THERAPY/EXERCISES: CPT

## 2019-06-07 NOTE — PROGRESS NOTES
Lisa Ohs  : 1945  [place] / Product Type: Medicare /  [address] at [facility] RICHARD MITCHELL  [address] [place] [address], [address], [place] [address].  [phone]   [phone]       OUTPATIENT PHYSICAL THERAPY: Daily Treatment Note and progress note 2019     ICD-10: Treatment Diagnosis: M48.062 spinal stenosis of lumbar region with neurogenic claudication, M43.10- degenerative spondylolisthesis  Precautions/Allergies:   Adhesive; Clindamycin; Dilaudid [hydromorphone]; and Keflex [cephalexin]   MD Orders: Evaluate and treat, modalities, core strengthening and stretching MEDICAL/REFERRING DIAGNOSIS:  Low back pain   DATE OF ONSET:   REFERRING PHYSICIAN: Cristobal Santacruz  RETURN PHYSICIAN APPOINTMENT: prn       Pre-treatment Symptoms/Complaints:  Pt states she thinks she is walking a little better. Pain: Initial:   not rated Post Session:  Not rated    Medications Last Reviewed:  2019    Updated Objective Findings:   None Today  Ms. Joan Bangura has attended 10 visits of PT and is improving with her endurance and mobility. It is appropriate to continue with current POC and goals. TREATMENT:     Aquatic Therapy (45 minutes): Aquatic treatment performed per flow grid for Decreased muscle strength, Decreased endurance, Decreased range of motion, Ease of movement and Low impact and reduced weight bearing activity. Cues provided for posture and balance. Aquatic Exercise Log       Date  19 Date  19 Date  19 Date  19   Activity/ Exercise       Walking forward 6 6 6 6   Walking backward 6 6 6 6   Walking sideways 6 6 6 6     Marching 6 6 6 6     Goose Step 6 6 6 6     Tip toes 3 3 3 3     Heels 3 3 3 3     Lunges Long step 6 Long step 6 Long step 6 Long step 6   Side step squats       LE Exercises 3. 5' holding rail 3. 5' holding rail 3. 5' holding rail 3.5 holding to rail     Hip Flex/Ext 15 with purple noodle 20 with purple noodle 20 with purple noodle 15 focus on hip rotation neutral     Hip Abd/Add 15 with purple noodle 20 with purple noodle 20 with purple noodle 15 focus on hip rotation neutral     Hip IR/ER         Calf raises         Knee Flex 15 with 3 overstretch  15 15 with 3 overstretch 15 3 overstretch     Squats  15       Leg Circles 15/15 15/15 15/15 15/15     Step Ups 15 15 15 15   UE Exercises         Squeeze In         Push Down         Pull Down         Bicep/Tricep       Rows/Press outs        Chi Positions         Trunk Rotation       Deep H2O/ Noodles 7' with assist and blue rings 7' with assist and blue rings 7' with assist and blue rings 7' with noodle and assist     Stabilization   Combo 3 min      Arms only         Legs only Bicycle 2 min Bicycle 3 min Bicycle 3 min 3 min   Cross   Country 2 min 3 min 3 min 3 min     Scissors 2 min 3 min 3 min 3 min     Crab walk Reverse clam 1 min  Clam 1 min      Lower abdominal   work  DKTC 2 min DKTC 3 min DKTC 3 min      Cardio         Jogging       Lap   Swimming         Stretches         Hamstrings         Heelcords         Hip flex/ rotators Standing stride step 5 x 30 sec        Neck         [facility] Portal    Treatment/Session Summary:    · Response to Treatment:   More stability in the water. Still extreme external rotation of both LE.  · Communication/Consultation:  None today  · Equipment provided today:  None today  · Recommendations/Intent for next treatment session: Next visit will focus on progressing exercises and gait. Effective Dates: 5/2/2019 TO 8/4/2019 (90 days).   Frequency/Duration: 2 times a week for 90 Day(s)    Visit Count:  5  Total Treatment Billable Duration:  45 minutes  PT Patient Time In/Time Out  Time In: 1000  Time Out: [address], PT    Future Appointments   Date Time Provider Tai Fisher   6/11/2019 10:45 AM July Tapia PTA [facility]   6/12/2019  9:10 AM [employer] LAB [facility] 6/13/2019 10:45 AM Madison Shorten, PTA [facility]   6/18/2019  8:30 AM Madison Shorten, PTA [facility]   6/19/2019 10:00 AM Can Mata MD [place] [facility]   6/20/2019 10:00 AM Madison Shorten, PTA [facility]   6/25/2019 11:30 AM Madison Shorten, PTA [facility]   6/27/2019 10:00 AM Madison Shorten, PTA [facility]   7/3/2019 11:15 AM Paulo Mason, PT [facility]   7/5/2019 10:45 AM Paulo Mason, PT [facility]   7/8/2019 10:15 AM Paulo Abbieing, PT [facility]   7/11/2019 10:45 AM Madison Shorten, PTA [facility]   7/16/2019 10:45 AM Madison Shorten, PTA [facility]   7/18/2019 10:45 AM Madison Shorten, PTA [facility]   7/23/2019 10:45 AM Madison Shorten, PTA [facility]   7/25/2019 10:45 AM Madison Shorten, PTA [facility]   7/30/2019 10:45 AM Madison Shorten, PTA [facility]   8/1/2019 10:45 AM Wilhemenia Cancer, Christ Bautista, PTA [facility]

## 2019-06-11 ENCOUNTER — HOSPITAL ENCOUNTER (OUTPATIENT)
Dept: PHYSICAL THERAPY | Age: 74
Discharge: HOME | End: 2019-06-11
Payer: MEDICARE

## 2019-06-11 PROCEDURE — 97113 AQUATIC THERAPY/EXERCISES: CPT

## 2019-06-11 NOTE — PROGRESS NOTES
Dominguez Castillo  : 1945  [place] / Product Type: Medicare /  [address]  at [facility] RICHARD MITCHELL  [address] [facility], [address] [place] [address],[address], [facility] [address].  [phone]   [phone]       OUTPATIENT PHYSICAL THERAPY: Daily Treatment Note 2019     ICD-10: Treatment Diagnosis: M48.062 spinal stenosis of lumbar region with neurogenic claudication, M43.10- degenerative spondylolisthesis  Precautions/Allergies:   Adhesive; Clindamycin; Dilaudid [hydromorphone]; and Keflex [cephalexin]   MD Orders: Evaluate and treat, modalities, core strengthening and stretching MEDICAL/REFERRING DIAGNOSIS:  Low back pain   DATE OF ONSET:   REFERRING PHYSICIAN: Alexander Hameed  RETURN PHYSICIAN APPOINTMENT: prn       Pre-treatment Symptoms/Complaints:  Pt with no new complaints today. She feels that her balance is improving. Pain: Initial:   not rated Post Session:  Not rated    Medications Last Reviewed:  2019    Updated Objective Findings:   None Today     TREATMENT:     Aquatic Therapy (45 minutes): Aquatic treatment performed per flow grid for Decreased muscle strength, Decreased endurance, Decreased range of motion, Ease of movement and Low impact and reduced weight bearing activity. Cues provided for posture and balance. Aquatic Exercise Log       Date  19 Date  19 Date  19 Date  19 Date  19   Activity/ Exercise        Walking forward 6 6 6 6 6   Walking backward 6 6 6 6 6   Walking sideways 6 6 6 6 6     Marching 6 6 6 6 6     Goose Step 6 6 6 6 6     Tip toes 3 3 3 3 3     Heels 3 3 3 3 3     Lunges Long step 6 Long step 6 Long step 6 Long step 6 Long step 6   Side step squats        LE Exercises 3. 5' holding rail 3. 5' holding rail 3. 5' holding rail 3.5 holding to rail 3. 5' holding rail      Hip Flex/Ext 15 with purple noodle 20 with purple noodle 20 with purple noodle 15 focus on hip rotation neutral 20 with purple noodle     Hip Abd/Add 15 with purple noodle 20 with purple noodle 20 with purple noodle 15 focus on hip rotation neutral 20 with purple noodle     Hip IR/ER          Calf raises          Knee Flex 15 with 3 overstretch  15 15 with 3 overstretch 15 3 overstretch 15 with 3 overstretch     Squats  15        Leg Circles 15/15 15/15 15/15 15/15 15/15     Step Ups 15 15 15 15 15   UE Exercises          Squeeze In          Push Down          Pull Down          Bicep/Tricep        Rows/Press outs         Chi Positions          Trunk Rotation        Deep H2O/ Noodles 7' with assist and blue rings 7' with assist and blue rings 7' with assist and blue rings 7' with noodle and assist 7' with noodle and asssist     Stabilization   Combo 3 min  Combo 3 min     Arms only          Legs only Bicycle 2 min Bicycle 3 min Bicycle 3 min 3 min 3 min   Cross   Country 2 min 3 min 3 min 3 min 3 min     Scissors 2 min 3 min 3 min 3 min 3 min     Crab walk Reverse clam 1 min  Clam 1 min       Lower abdominal   work  DKTC 2 min DKTC 3 min DKTC 3 min  DKTC 3 min     Cardio          Jogging        Lap   Swimming          Stretches          Hamstrings          Heelcords          Hip flex/ rotators Standing stride step 5 x 30 sec         Neck          [facility] Portal    Treatment/Session Summary:    · Response to Treatment:   Pt doing better in the deeper water with less assistance. · Communication/Consultation:  None today  · Equipment provided today:  None today  · Recommendations/Intent for next treatment session: Next visit will focus on progressing exercises and gait. Effective Dates: 5/2/2019 TO 8/4/2019 (90 days).   Frequency/Duration: 2 times a week for 90 Day(s)    Visit Count:  5  Total Treatment Billable Duration:  45 minutes  PT Patient Time In/Time Out  Time In: 1030  Time Out: [address], PTA    Future Appointments   Date Time Provider Tai Fisher   6/12/2019  9:10 AM [facility] LAB [facility] 6/13/2019 10:45 AM Karthik Hernandez, PTA [facility]   6/18/2019  8:30 AM Karthik Bemidji, PTA [facility]   6/19/2019 10:00 AM Lavern Rudolph MD [place] [facility]   6/20/2019 10:00 AM Karthik Bemidji, PTA [facility]   6/25/2019 11:30 AM Karthik Hernandez, PTA [facility]   6/27/2019 10:00 AM Karthik Bemidji, PTA [facility]   7/3/2019 11:15 AM Cherie Lis, PT [facility]   7/5/2019 10:45 AM Cherie Lis, PT [facility]   7/8/2019 10:15 AM Cherie Lis, PT [facility]   7/11/2019 10:45 AM Karthik Bemidji, PTA [facility]   7/16/2019 10:45 AM Karthik Hernandez, PTA [facility]   7/18/2019 10:45 AM Karthik Bemidji, PTA [facility]   7/23/2019 10:45 AM Karthik Bemidji, PTA [facility]   7/25/2019 10:45 AM Karthik Bemidji, PTA [facility]   7/30/2019 10:45 AM Karthik Hernandez, PTA [facility]   8/1/2019 10:45 AM Rosanna Tamayo, Celeste Chapman, PTA [facility]

## 2019-06-13 ENCOUNTER — HOSPITAL ENCOUNTER (OUTPATIENT)
Dept: PHYSICAL THERAPY | Age: 74
Discharge: HOME | End: 2019-06-13
Payer: MEDICARE

## 2019-06-13 PROCEDURE — 97113 AQUATIC THERAPY/EXERCISES: CPT

## 2019-06-13 NOTE — PROGRESS NOTES
Marquez Sandra  : 1945  [place] / Product Type: Medicare /  [address]  at [facility] RICHARD MITCHELL  [address] [facility], [address] [place] [address],[address], [facility] [address].  [phone]   [phone]       OUTPATIENT PHYSICAL THERAPY: Daily Treatment Note 2019     ICD-10: Treatment Diagnosis: M48.062 spinal stenosis of lumbar region with neurogenic claudication, M43.10- degenerative spondylolisthesis  Precautions/Allergies:   Adhesive; Clindamycin; Dilaudid [hydromorphone]; and Keflex [cephalexin]   MD Orders: Evaluate and treat, modalities, core strengthening and stretching MEDICAL/REFERRING DIAGNOSIS:  Low back pain   DATE OF ONSET:   REFERRING PHYSICIAN: Erlin Michaud*  RETURN PHYSICIAN APPOINTMENT: prn       Pre-treatment Symptoms/Complaints:  Pt states she feels tired today. Pain: Initial:   not rated Post Session:  Not rated    Medications Last Reviewed:  2019    Updated Objective Findings:   None Today     TREATMENT:     Aquatic Therapy (45 minutes): Aquatic treatment performed per flow grid for Decreased muscle strength, Decreased endurance, Decreased range of motion, Ease of movement and Low impact and reduced weight bearing activity. Cues provided for posture and balance. Aquatic Exercise Log       Date  19 Date  19 Date  19 Date  19 Date  19 Date  19   Activity/ Exercise         Walking forward 6 6 6 6 6 6   Walking backward 6 6 6 6 6 6   Walking sideways 6 6 6 6 6 6     Marching 6 6 6 6 6 6     Goose Step 6 6 6 6 6 6     Tip toes 3 3 3 3 3 3     Heels 3 3 3 3 3 3     Lunges Long step 6 Long step 6 Long step 6 Long step 6 Long step 6 Long step 6   Side step squats         LE Exercises 3. 5' holding rail 3. 5' holding rail 3. 5' holding rail 3.5 holding to rail 3. 5' holding rail  3. 5' holding rail     Hip Flex/Ext 15 with purple noodle 20 with purple noodle 20 with purple noodle 15 focus on hip rotation neutral 20 with purple noodle 20 with purple noodle     Hip Abd/Add 15 with purple noodle 20 with purple noodle 20 with purple noodle 15 focus on hip rotation neutral 20 with purple noodle 20 with purple noodle     Hip IR/ER           Calf raises           Knee Flex 15 with 3 overstretch  15 15 with 3 overstretch 15 3 overstretch 15 with 3 overstretch 15 with 3 overstretch      Squats  15         Leg Circles 15/15 15/15 15/15 15/15 15/15 15/15     Step Ups 15 15 15 15 15 15   UE Exercises           Squeeze In           Push Down           Pull Down           Bicep/Tricep         Rows/Press outs          Chi Positions           Trunk Rotation         Deep H2O/ Noodles 7' with assist and blue rings 7' with assist and blue rings 7' with assist and blue rings 7' with noodle and assist 7' with noodle and asssist 7' with noodle and asssist     Stabilization   Combo 3 min  Combo 3 min Combo 3 min     Arms only           Legs only Bicycle 2 min Bicycle 3 min Bicycle 3 min 3 min 3 min 3 min   Cross   Country 2 min 3 min 3 min 3 min 3 min 3 min     Scissors 2 min 3 min 3 min 3 min 3 min 3 min     Crab walk Reverse clam 1 min  Clam 1 min        Lower abdominal   work  DKTC 2 min DKTC 3 min DKTC 3 min  DKTC 3 min DKTC 3 min     Cardio           Jogging         Lap   Swimming           Stretches           Hamstrings           Heelcords           Hip flex/ rotators Standing stride step 5 x 30 sec          Neck           [facility] Portal    Treatment/Session Summary:    · Response to Treatment:   Continues to do well with deeper water. · Communication/Consultation:  None today  · Equipment provided today:  None today  · Recommendations/Intent for next treatment session: Next visit will focus on progressing exercises and gait. Effective Dates: 5/2/2019 TO 8/4/2019 (90 days).   Frequency/Duration: 2 times a week for 90 Day(s)    Visit Count:  5  Total Treatment Billable Duration:  45 minutes  PT Patient Time In/Time Out  Time In: 1030  Time Out: [address] [facility],[address] [place], [place]    Future Appointments   Date Time Provider Tai Fisher   6/18/2019  8:30 AM Jeannette Ree, PTA [facility]   6/19/2019 10:00 AM Vonda García MD [place] [facility]   6/20/2019 10:00 AM Jeannette Ree, PTA [facility]   6/25/2019 11:30 AM Jeannette Ree, PTA [facility]   6/27/2019 10:00 AM Jeannette Ree, PTA [facility]   7/3/2019 11:15 AM Ricardo Patterson, PT [facility]   7/5/2019 10:45 AM Zaida Cruz, PT [facility]   7/8/2019 10:15 AM Zaida Cruz, PT [facility]   7/11/2019 10:45 AM Jeannette Ree, PTA [facility]   7/16/2019 10:45 AM Jeannette Ree, PTA [facility]   7/18/2019 10:45 AM Jeannette Ree, PTA [facility]   7/23/2019 10:45 AM Jeannette Ree, PTA [facility]   7/25/2019 10:45 AM Jeannette Ree, PTA [facility]   7/30/2019 10:45 AM Jeannette Ree, PTA [facility]   8/1/2019 10:45 AM Ulla Conception, Lilton Meckel, PTA [facility]

## 2019-06-18 ENCOUNTER — HOSPITAL ENCOUNTER (OUTPATIENT)
Dept: PHYSICAL THERAPY | Age: 74
Discharge: HOME | End: 2019-06-18
Payer: MEDICARE

## 2019-06-18 PROCEDURE — 97113 AQUATIC THERAPY/EXERCISES: CPT

## 2019-06-18 NOTE — PROGRESS NOTES
Sienna Richards  : 1945  [place] / Product Type: Medicare /  [address] at [facility] RICHARD MITCHELL  1101 [facility], [address], [address] [place] [address]  [phone]   [phone]       OUTPATIENT PHYSICAL THERAPY: Daily Treatment Note 2019     ICD-10: Treatment Diagnosis: M48.062 spinal stenosis of lumbar region with neurogenic claudication, M43.10- degenerative spondylolisthesis  Precautions/Allergies:   Adhesive; Clindamycin; Dilaudid [hydromorphone]; and Keflex [cephalexin]   MD Orders: Evaluate and treat, modalities, core strengthening and stretching MEDICAL/REFERRING DIAGNOSIS:  Low back pain   DATE OF ONSET:   REFERRING PHYSICIAN: Kareem Richards*  RETURN PHYSICIAN APPOINTMENT: prn       Pre-treatment Symptoms/Complaints:   Pt with no new complaints today. Pain: Initial:   not rated Post Session:  Not rated    Medications Last Reviewed:  2019    Updated Objective Findings:   None Today     TREATMENT:     Aquatic Therapy (45 minutes): Aquatic treatment performed per flow grid for Decreased muscle strength, Decreased endurance, Decreased range of motion, Ease of movement and Low impact and reduced weight bearing activity. Cues provided for posture and balance. Aquatic Exercise Log       Date  19 Date  19   Activity/ Exercise     Walking forward 6 6   Walking backward 6 6   Walking sideways 6 6     Marching 6 6     Goose Step 6 6     Tip toes 3 3     Heels 3 3     Lunges Long step 6 Long step 6   Side step squats     LE Exercises 3. 5' holding rail 3. 5' holding rail      Hip Flex/Ext 20 with purple noodle 20 with large noodle     Hip Abd/Add 20 with purple noodle 20 with large noodle      Hip IR/ER       Calf raises       Knee Flex 15 with 3 overstretch  20 with 3 over stretch     Squats       Leg Circles 15/15 20/20     Step Ups 15 20 B   UE Exercises       Squeeze In       Push Down       Pull Down       Bicep/Tricep     Rows/Press outs      Chi Positions       Trunk Rotation     Deep H2O/ Noodles 7' with noodle and asssist 7' with noodle and assist      Stabilization Combo 3 min Combo 3 min     Arms only       Legs only 3 min 3 min   Cross   Country 3 min 3 min     Scissors 3 min 3 min     Crab walk     Lower abdominal   work  DKTC 3 min DKTC 3 min     Cardio       Jogging     Lap   Swimming       Stretches       Hamstrings       Heelcords       Hip flex/ rotators       Neck       [facility] Portal    Treatment/Session Summary:    · Response to Treatment:   Pt continues to do well with aquatic therapy. · Communication/Consultation:  None today  · Equipment provided today:  None today  · Recommendations/Intent for next treatment session: Next visit will focus on progressing exercises and gait. Effective Dates: 5/2/2019 TO 8/4/2019 (90 days).   Frequency/Duration: 2 times a week for 90 Day(s)    Visit Count:  5  Total Treatment Billable Duration:  45 minutes  PT Patient Time In/Time Out  Time In: 0830  Time Out: 0915    Karen Shah PTA    Future Appointments   Date Time Provider Tai Fisher   6/19/2019 10:00 AM Kendall Marino MD [place] [facility]   6/20/2019 10:00 AM Mikki EliazarMAKSIM rubi [facility]   6/25/2019 11:30 AM Mikki Perea PTA [facility]   6/27/2019 10:00 AM Mikki Perea PTA [facility]   7/3/2019 11:15 AM Barak James, PT [facility]   7/5/2019 10:45 AM Barak James, PT [facility]   7/8/2019 10:15 AM Barak James, PT [facility]   7/11/2019 10:45 AM Mikki EliazarMAKSIM rubi [facility]   7/16/2019 10:45 AM Mikki EliazarMAKSIM rubi [facility]   7/18/2019 10:45 AM Mikki EliazarMAKSIM rubi [facility]   7/23/2019 10:45 AM Mikki EliazarMAKSIM rubi [facility]   7/25/2019 10:45 AM Mikki EliazarMAKSIM rubi [facility]   7/30/2019 10:45 AM [facility]   8/1/2019 10:45 AM Columba P[place], [place] [facility]

## 2019-06-20 ENCOUNTER — HOSPITAL ENCOUNTER (OUTPATIENT)
Dept: PHYSICAL THERAPY | Age: 74
Discharge: HOME | End: 2019-06-20
Payer: MEDICARE

## 2019-06-20 PROCEDURE — 97113 AQUATIC THERAPY/EXERCISES: CPT

## 2019-06-20 NOTE — PROGRESS NOTES
Alec Rueda  : 1945  [place] / Product Type: Medicare /  [address] at [facility] RICHARD MITCHELL  1101 [facility], [address], [address] [place] [address]  [phone]   [phone]       OUTPATIENT PHYSICAL THERAPY: Daily Treatment Note 2019     ICD-10: Treatment Diagnosis: M48.062 spinal stenosis of lumbar region with neurogenic claudication, M43.10- degenerative spondylolisthesis  Precautions/Allergies:   Adhesive; Clindamycin; Dilaudid [hydromorphone]; and Keflex [cephalexin]   MD Orders: Evaluate and treat, modalities, core strengthening and stretching MEDICAL/REFERRING DIAGNOSIS:  Low back pain   DATE OF ONSET:   REFERRING PHYSICIAN: Lane Tafoya  RETURN PHYSICIAN APPOINTMENT: prn       Pre-treatment Symptoms/Complaints:   Pt states she is feeling better but still has fear of going up and down steps without holding on. Pain: Initial:   not rated Post Session:  Not rated    Medications Last Reviewed:  2019    Updated Objective Findings:   None Today     TREATMENT:     Aquatic Therapy (45 minutes): Aquatic treatment performed per flow grid for Decreased muscle strength, Decreased endurance, Decreased range of motion, Ease of movement and Low impact and reduced weight bearing activity. Cues provided for posture and balance. Aquatic Exercise Log       Date  19 Date  19 Date  19   Activity/ Exercise      Walking forward 6 6 6   Walking backward 6 6 6   Walking sideways 6 6 6     Marching 6 6 6     Goose Step 6 6 6     Tip toes 3 3 3     Heels 3 3 3     Lunges Long step 6 Long step 6 Long step 6   Side step squats      LE Exercises 3. 5' holding rail 3. 5' holding rail  3. 5' holding rail     Hip Flex/Ext 20 with purple noodle 20 with large noodle 20 with large noodle      Hip Abd/Add 20 with purple noodle 20 with large noodle  20 with large noodle     Hip IR/ER        Calf raises        Knee Flex 15 with 3 overstretch  20 with 3 over stretch 20 with 3 overstretch     Squats        Leg Circles 15/15 20/20 20/20     Step Ups 15 20 B 20 B   UE Exercises        Squeeze In        Push Down        Pull Down        Bicep/Tricep      Rows/Press outs       Chi Positions        Trunk Rotation      Deep H2O/ Noodles 7' with noodle and asssist 7' with noodle and assist  7' with noodle and assist     Stabilization Combo 3 min Combo 3 min Combo 3 min     Arms only        Legs only 3 min 3 min 3 min   Cross   Country 3 min 3 min 3 min     Scissors 3 min 3 min 3 min     Crab walk      Lower abdominal   work  DKTC 3 min DKTC 3 min DKTC 3 min     Cardio        Jogging      Lap   Swimming        Stretches        Hamstrings        Heelcords        Hip flex/ rotators        Neck        [facility] Portal    Treatment/Session Summary:    · Response to Treatment:   Pt doing better with balance. Plan to use step next visit without using rail. · Communication/Consultation:  None today  · Equipment provided today:  None today  · Recommendations/Intent for next treatment session: Next visit will focus on progressing exercises and gait. Effective Dates: 5/2/2019 TO 8/4/2019 (90 days).   Frequency/Duration: 2 times a week for 90 Day(s)    Visit Count:  5  Total Treatment Billable Duration:  45 minutes  PT Patient Time In/Time Out  Time In: 0945  Time Out: [address] MA[place] [place]    Future Appointments   Date Time Provider Tai Fisher   6/25/2019 11:30 AM [facility]   6/27/2019 10:00 AM Roslyn Ewing PTA [facility]   7/3/2019 11:15 AM Viviane Marc, PT [facility]   7/5/2019 10:45 AM Viviane Marc, PT [facility]   7/8/2019 10:15 AM Viviane Marc, PT [facility]   7/11/2019 10:45 AM Roslyn Ewing PTA [facility]   7/16/2019 10:45 AM Roslyn Ewing PTA [facility]   7/18/2019 10:45 AM Misael Rm PTA [facility] 7/23/2019 10:45 AM Elizabeth Isaac, MAKSIM [facility]   7/25/2019 10:45 AM Elizabeth Isaac, [facility]   7/30/2019 10:45 AM MAKSIM Madrigal [facility]   8/1/2019 10:45 AM Elizabeth Isaac, MAKSIM [facility]   10/22/2019  9:30 AM [facility]   10/29/2019  9:45 AM Wanda Garza MD [place] [facility]

## 2019-06-25 ENCOUNTER — HOSPITAL ENCOUNTER (OUTPATIENT)
Dept: PHYSICAL THERAPY | Age: 74
Discharge: HOME | End: 2019-06-25
Payer: MEDICARE

## 2019-06-25 PROCEDURE — 97113 AQUATIC THERAPY/EXERCISES: CPT

## 2019-06-25 NOTE — PROGRESS NOTES
Josr Alex  : 1945  [place] / Product Type: Medicare /  [address] at [facility] RICHARD MITCHELL  [address] [place] [address], [address], [place] [address].  [phone]   [phone]       OUTPATIENT PHYSICAL THERAPY: Daily Treatment Note 2019     ICD-10: Treatment Diagnosis: M48.062 spinal stenosis of lumbar region with neurogenic claudication, M43.10- degenerative spondylolisthesis  Precautions/Allergies:   Adhesive; Clindamycin; Dilaudid [hydromorphone]; and Keflex [cephalexin]   MD Orders: Evaluate and treat, modalities, core strengthening and stretching MEDICAL/REFERRING DIAGNOSIS:  Low back pain   DATE OF ONSET:   REFERRING PHYSICIAN: Jennet Bakes, Woody Frankel D*  RETURN PHYSICIAN APPOINTMENT: prn       Pre-treatment Symptoms/Complaints:   Pt doing better with not holding rail. She states she is tired today. Pain: Initial:   not rated Post Session:  Not rated    Medications Last Reviewed:  2019    Updated Objective Findings:   None Today     TREATMENT:     Aquatic Therapy (45 minutes): Aquatic treatment performed per flow grid for Decreased muscle strength, Decreased endurance, Decreased range of motion, Ease of movement and Low impact and reduced weight bearing activity. Cues provided for posture and balance. Aquatic Exercise Log       Date  19 Date  19 Date  19 Date  19   Activity/ Exercise       Walking forward 6 6 6 6   Walking backward 6 6 6 6   Walking sideways 6 6 6 6     Marching 6 6 6 6     Goose Step 6 6 6 6     Tip toes 3 3 3 3     Heels 3 3 3 3     Lunges Long step 6 Long step 6 Long step 6 Long step 6   Side step squats       LE Exercises 3. 5' holding rail 3. 5' holding rail  3. 5' holding rail 3. 5' holding rail      Hip Flex/Ext 20 with purple noodle 20 with large noodle 20 with large noodle  20 with large noodle     Hip Abd/Add 20 with purple noodle 20 with large noodle  20 with large noodle 20 with large noodle     Hip IR/ER         Calf raises         Knee Flex 15 with 3 overstretch  20 with 3 over stretch 20 with 3 overstretch 20 with 3 overstretch      Squats         Leg Circles 15/15 20/20 20/20 20/20     Step Ups 15 20 B 20 B 20 B   UE Exercises         Squeeze In         Push Down         Pull Down         Bicep/Tricep       Rows/Press outs        Chi Positions         Trunk Rotation       Deep H2O/ Noodles 7' with noodle and asssist 7' with noodle and assist  7' with noodle and assist 7' with noodle and assist     Stabilization Combo 3 min Combo 3 min Combo 3 min Combo 3 min     Arms only         Legs only 3 min 3 min 3 min 3 min   [address] 3 min 3 min 3 min 3 min     Scissors 3 min 3 min 3 min 3 min     Crab walk       Lower abdominal   work  DKTC 3 min DKTC 3 min DKTC 3 min DKTC 3 min     Cardio         Jogging       Lap   Swimming         Stretches         Hamstrings         Heelcords         Hip flex/ rotators         Neck         [facility] Portal    Treatment/Session Summary:    · Response to Treatment:   Pt continues to do well with stretching and exercises. · Communication/Consultation:  None today  · Equipment provided today:  None today  · Recommendations/Intent for next treatment session: Next visit will focus on progressing exercises and gait. Effective Dates: 5/2/2019 TO 8/4/2019 (90 days).   Frequency/Duration: 2 times a week for 90 Day(s)    Visit Count:  5  Total Treatment Billable Duration:  45 minutes  PT Patient Time In/Time Out  Time In: 1110  Time Out: MAKSIM [place]    Future Appointments   Date Time Provider Tai Fisher   6/27/2019 10:00 AM Elizabeth Isaac PTA [facility]   7/3/2019 11:15 AM Dea Coronel, PT [facility]   7/5/2019 10:45 AM Dea Coronel PT [facility]   7/8/2019 10:15 AM Dea Coronel, PT [facility]   7/11/2019 10:45 AM [facility]   7/16/2019 10:45 AM J[facility] Williamson [facility]   7/18/2019 10:45 AM [facility]   7/23/2019 10:45 AM MAKSIM Dowling [facility]   7/25/2019 10:45 AM [facility]   7/30/2019 10:45 AM [facility]   8/1/2019 10:45 AM Jaja Williamson PTA [facility]   10/22/2019  9:30 AM [facility]   10/29/2019  9:45 AM Chuck Matthews MD [place] [facility]

## 2019-06-27 ENCOUNTER — HOSPITAL ENCOUNTER (OUTPATIENT)
Dept: PHYSICAL THERAPY | Age: 74
Discharge: HOME | End: 2019-06-27
Payer: MEDICARE

## 2019-06-27 PROCEDURE — 97113 AQUATIC THERAPY/EXERCISES: CPT

## 2019-06-27 NOTE — PROGRESS NOTES
Do Stern  : 1945  [place] / Product Type: Medicare /  [address] at [facility] RICHARD MITCHELL  [address] [place] [address], [address], [place] [address].  [phone]   [phone]       OUTPATIENT PHYSICAL THERAPY: Daily Treatment Note 2019     ICD-10: Treatment Diagnosis: M48.062 spinal stenosis of lumbar region with neurogenic claudication, M43.10- degenerative spondylolisthesis  Precautions/Allergies:   Adhesive; Clindamycin; Dilaudid [hydromorphone]; and Keflex [cephalexin]   MD Orders: Evaluate and treat, modalities, core strengthening and stretching MEDICAL/REFERRING DIAGNOSIS:  Low back pain   DATE OF ONSET:   REFERRING PHYSICIAN: Baldev Foster*  RETURN PHYSICIAN APPOINTMENT: prn       Pre-treatment Symptoms/Complaints:   Pt states she is tired today. Pain: Initial:   not rated Post Session:  Not rated    Medications Last Reviewed:  2019    Updated Objective Findings:   None Today     TREATMENT:     Aquatic Therapy (45 minutes): Aquatic treatment performed per flow grid for Decreased muscle strength, Decreased endurance, Decreased range of motion, Ease of movement and Low impact and reduced weight bearing activity. Cues provided for posture and balance. Aquatic Exercise Log       Date  19 Date  19 Date  19 Date  19 Date  19   Activity/ Exercise        Walking forward 6 6 6 6 6   Walking backward 6 6 6 6 6   Walking sideways 6 6 6 6 6     Marching 6 6 6 6 6     Goose Step 6 6 6 6 6     Tip toes 3 3 3 3 3     Heels 3 3 3 3 3     Lunges Long step 6 Long step 6 Long step 6 Long step 6 Long step 6   Side step squats        LE Exercises 3. 5' holding rail 3. 5' holding rail  3. 5' holding rail 3. 5' holding rail  3. 5' holding rail     Hip Flex/Ext 20 with purple noodle 20 with large noodle 20 with large noodle  20 with large noodle 20 with large noodle     Hip Abd/Add 20 with purple noodle 20 with large noodle  20 with large noodle 20 with large noodle 20 with large noodle     Hip IR/ER          Calf raises          Knee Flex 15 with 3 overstretch  20 with 3 over stretch 20 with 3 overstretch 20 with 3 overstretch  20 with 3 overstretch     Squats          Leg Circles 15/15 20/20 20/20 20/20 20/20     Step Ups 15 20 B 20 B 20 B 20 B top step into 4.5'  5 B first step going out of pool without holding on   UE Exercises          Squeeze In          Push Down          Pull Down          Bicep/Tricep        Rows/Press outs         Chi Positions          Trunk Rotation        Deep H2O/ Noodles 7' with noodle and asssist 7' with noodle and assist  7' with noodle and assist 7' with noodle and assist 7' with noodle and assist     Stabilization Combo 3 min Combo 3 min Combo 3 min Combo 3 min Combo 3 min     Arms only          Legs only 3 min 3 min 3 min 3 min 3 min   [address] 93 3 min 3 min 3 min 3 min 3 min     Scissors 3 min 3 min 3 min 3 min 3 min     Crab walk        Lower abdominal   work  DKTC 3 min DKTC 3 min DKTC 3 min DKTC 3 min      Cardio          Jogging        Lap   Swimming          Stretches          Hamstrings          Heelcords          Hip flex/ rotators          Neck          [facility] Portal    Treatment/Session Summary:    · Response to Treatment:   Pt doing well with mobility and exercises. She has a fear of going up and down steps without holding on. · Communication/Consultation:  None today  · Equipment provided today:  None today  · Recommendations/Intent for next treatment session: Next visit will focus on progressing exercises and gait. Effective Dates: 5/2/2019 TO 8/4/2019 (90 days).   Frequency/Duration: 2 times a week for 90 Day(s)    Visit Count:  5  Total Treatment Billable Duration:  45 minutes  PT Patient Time In/Time Out  Time In: 0945  Time Out: 110 MAKSIM [place]    Future Appointments   Date Time Provider Tai Fisher   7/3/2019 11:15 AM Anastasia Escobedo, PT [facility]   7/5/2019 10:45 AM Anastasia Escobedo, PT [facility]   7/8/2019 10:15 AM Anastasia Escobedo, PT [facility]   7/11/2019 10:45 AM Anirudh Solid, PTA [facility]   7/16/2019 10:45 AM Anirudh Solid, PTA [facility]   7/18/2019 10:45 AM Anirudh Solid, PTA [facility]   7/23/2019 10:45 AM Anirudh Solid, PTA [facility]   7/25/2019 10:45 AM Anirudh Solid, PTA [facility]   7/30/2019 10:45 AM Anirudh Solid, PTA [facility]   8/1/2019 10:45 AM Anirudh Solid, PTA [facility]   10/22/2019  9:30 AM [facility]   10/29/2019  9:45 AM Gal Lawson MD [place] [facility]

## 2019-07-03 ENCOUNTER — HOSPITAL ENCOUNTER (OUTPATIENT)
Dept: PHYSICAL THERAPY | Age: 74
Discharge: HOME | End: 2019-07-03
Payer: MEDICARE

## 2019-07-03 PROCEDURE — 97113 AQUATIC THERAPY/EXERCISES: CPT

## 2019-07-03 NOTE — PROGRESS NOTES
Xena Iqbal  : 1945  [place] / Product Type: Medicare /  [address] at [facility] RICHARD MITCHELL  [facility], [address], [address]  [phone]   [phone]       OUTPATIENT PHYSICAL THERAPY: Daily Treatment Note 7/3/2019     ICD-10: Treatment Diagnosis: M48.062 spinal stenosis of lumbar region with neurogenic claudication, M43.10- degenerative spondylolisthesis  Precautions/Allergies:   Adhesive; Clindamycin; Dilaudid [hydromorphone]; and Keflex [cephalexin]   MD Orders: Evaluate and treat, modalities, core strengthening and stretching MEDICAL/REFERRING DIAGNOSIS:  Low back pain   DATE OF ONSET:   REFERRING PHYSICIAN: Jozef Delgadillo  RETURN PHYSICIAN APPOINTMENT: prn       Pre-treatment Symptoms/Complaints:   Pt states she was sore in her hips    Pain: Initial:   not rated Post Session:  Not rated    Medications Last Reviewed:  7/3/2019    Updated Objective Findings:   None Today     TREATMENT:     Aquatic Therapy (45 minutes): Aquatic treatment performed per flow grid for Decreased muscle strength, Decreased endurance, Decreased range of motion, Ease of movement and Low impact and reduced weight bearing activity. Cues provided for posture and balance. Aquatic Exercise Log       Date  19 Date  19 Date  19 Date  19 Date  19 Date  7/3/19   Activity/ Exercise         Walking forward 6 6 6 6 6 6   Walking backward 6 6 6 6 6 6   Walking sideways 6 6 6 6 6 6     Marching 6 6 6 6 6 6     Goose Step 6 6 6 6 6 6     Tip toes 3 3 3 3 3 3     Heels 3 3 3 3 3 3     Lunges Long step 6 Long step 6 Long step 6 Long step 6 Long step 6 Long step 6   Side step squats         LE Exercises 3. 5' holding rail 3. 5' holding rail  3. 5' holding rail 3. 5' holding rail  3. 5' holding rail 4.5 with small noodle     Hip Flex/Ext 20 with purple noodle 20 with large noodle 20 with large noodle  20 with large noodle 20 with large noodle 10     Hip Abd/Add 20 with purple noodle 20 with large noodle  20 with large noodle 20 with large noodle 20 with large noodle 10     Hip IR/ER           Calf raises           Knee Flex 15 with 3 overstretch  20 with 3 over stretch 20 with 3 overstretch 20 with 3 overstretch  20 with 3 overstretch 10 with overstretch     Squats           Leg Circles 15/15 20/20 20/20 20/20 20/20 20/20     Step Ups 15 20 B 20 B 20 B 20 B top step into 4.5'  5 B first step going out of pool without holding on 20 B top step  5 B first step exiting   UE Exercises           Squeeze In           Push Down           Pull Down           Bicep/Tricep         Rows/Press outs          Chi Positions           Trunk Rotation         Deep H2O/ Noodles 7' with noodle and asssist 7' with noodle and assist  7' with noodle and assist 7' with noodle and assist 7' with noodle and assist 7' with noodle and assist     Stabilization Combo 3 min Combo 3 min Combo 3 min Combo 3 min Combo 3 min      Arms only           Legs only 3 min 3 min 3 min 3 min 3 min 3 min   [address] 3 min 3 min 3 min 3 min 3 min 3 min     Scissors 3 min 3 min 3 min 3 min 3 min 3 min     Crab walk         Lower abdominal   work  DKTC 3 min DKTC 3 min DKTC 3 min DKTC 3 min  DKTC x 3 min     Cardio           Jogging         Lap   Swimming           Stretches           Hamstrings           Heelcords           Hip flex/ rotators           Neck           [facility] Portal    Treatment/Session Summary:    · Response to Treatment:   Pt does well in the water. Mobility improved. · Communication/Consultation:  None today  · Equipment provided today:  None today  · Recommendations/Intent for next treatment session: Next visit will focus on progressing exercises and gait. Effective Dates: 5/2/2019 TO 8/4/2019 (90 days).   Frequency/Duration: 2 times a week for 90 Day(s)    Visit Count:  5  Total Treatment Billable Duration:  45 minutes  PT Patient Time In/Time Out  Time In: 1115  Time Out: [facility], PT    Future Appointments   Date Time Provider Tai Natalia   7/5/2019 10:45 AM Roshan Fajardo, PT [facility]   7/8/2019 10:15 AM Zaida Birmingham, PT [facility]   7/11/2019 10:45 AM Jocelin Modest, PTA [facility]   7/16/2019 10:45 AM Jocelin Modest, PTA [facility]   7/18/2019 10:45 AM Jocelin Modest, PTA [facility]   7/23/2019 10:45 AM Jocelin Modest, PTA [facility]   7/25/2019 10:45 AM Jocelin Modest, PTA [facility]   7/30/2019 10:45 AM Jocelin Modest, PTA [facility]   8/1/2019 10:45 AM Jocelin Modest, PTA [facility]   10/22/2019  9:30 AM 10 [facility] 10 [facility] 10 [facility]   10/29/2019  9:45 AM Delmer Andujar MD [facility] 10 [facility] 10 [facility]

## 2019-07-05 ENCOUNTER — HOSPITAL ENCOUNTER (OUTPATIENT)
Dept: PHYSICAL THERAPY | Age: 74
Discharge: HOME | End: 2019-07-05
Payer: MEDICARE

## 2019-07-05 PROCEDURE — 97113 AQUATIC THERAPY/EXERCISES: CPT

## 2019-07-05 NOTE — PROGRESS NOTES
Maria Guadalupe Fee  : 1945  [place] / Product Type: Medicare /  [address] at [facility] RICHARD MITCHELL  [address] [place] [address], [address], [place] [address].  [phone]   [phone]       OUTPATIENT PHYSICAL THERAPY: Daily Treatment Note 2019     ICD-10: Treatment Diagnosis: M48.062 spinal stenosis of lumbar region with neurogenic claudication, M43.10- degenerative spondylolisthesis  Precautions/Allergies:   Adhesive; Clindamycin; Dilaudid [hydromorphone]; and Keflex [cephalexin]   MD Orders: Evaluate and treat, modalities, core strengthening and stretching MEDICAL/REFERRING DIAGNOSIS:  Low back pain   DATE OF ONSET:   REFERRING PHYSICIAN: Andree Dey*  RETURN PHYSICIAN APPOINTMENT: prn       Pre-treatment Symptoms/Complaints:   Pt states she was sore after last session but she is fine today. Pain: Initial:   not rated Post Session:  Not rated    Medications Last Reviewed:  2019    Updated Objective Findings:   None Today     TREATMENT:     Aquatic Therapy (45 minutes): Aquatic treatment performed per flow grid for Decreased muscle strength, Decreased endurance, Decreased range of motion, Ease of movement and Low impact and reduced weight bearing activity. Cues provided for posture and balance. Aquatic Exercise Log       Date  19 Date  19 Date  19 Date  19 Date  19   Activity/ Exercise        Walking forward 6 6 6 6 6   Walking backward 6 6 6 6 6   Walking sideways 6 6 6 6 6     Marching 6 6 6 6 6     Goose Step 6 6 6 6 6     Tip toes 3 3 3 3 3     Heels 3 3 3 3 3     Lunges Long step 6 Long step 6 Long step 6 Long step 6 Long step 6   Side step squats        LE Exercises 3. 5' holding rail 3. 5' holding rail  3. 5' holding rail 3. 5' holding rail  4.5 holding rail     Hip Flex/Ext 20 with purple noodle 20 with large noodle 20 with large noodle  20 with large noodle 20 with small noodle Hip Abd/Add 20 with purple noodle 20 with large noodle  20 with large noodle 20 with large noodle 20 with small noodle     Hip IR/ER          Calf raises          Knee Flex 15 with 3 overstretch  20 with 3 over stretch 20 with 3 overstretch 20 with 3 overstretch  20 with 3 overstretch     Squats          Leg Circles 15/15 20/20 20/20 20/20 20/20     Step Ups 15 20 B 20 B 20 B 20 B top step into 4.5'  5 B first step going out of pool without holding on   UE Exercises          Squeeze In          Push Down          Pull Down          Bicep/Tricep        Rows/Press outs         Chi Positions          Trunk Rotation        Deep H2O/ Noodles 7' with noodle and asssist 7' with noodle and assist  7' with noodle and assist 7' with noodle and assist 7' with noodle and assist     Stabilization Combo 3 min Combo 3 min Combo 3 min Combo 3 min Combo 3 min     Arms only          Legs only 3 min 3 min 3 min 3 min 3 min   [address] 93 3 min 3 min 3 min 3 min 3 min     Scissors 3 min 3 min 3 min 3 min 3 min     Crab walk        Lower abdominal   work  DKTC 3 min DKTC 3 min DKTC 3 min DKTC 3 min      Cardio          Jogging        Lap   Swimming          Stretches          Hamstrings          Heelcords          Hip flex/ rotators          Neck          [facility] Portal    Treatment/Session Summary:    · Response to Treatment:   Doing well. Continues to have ext rotation at hip that effects gait. · Communication/Consultation:  None today  · Equipment provided today:  None today  · Recommendations/Intent for next treatment session: Next visit will focus on progressing exercises and gait. Effective Dates: 5/2/2019 TO 8/4/2019 (90 days).   Frequency/Duration: 2 times a week for 90 Day(s)    Visit Count:  5  Total Treatment Billable Duration:  45 minutes  PT Patient Time In/Time Out  Time In: 1100  Time Out: [address] PT    Future Appointments   Date Time Provider Tai Fisher   7/8/2019 10:15 AM Dominik Zaida, PT [facility]   7/11/2019 10:45 AM Jillyn Canard, PTA [facility]   7/16/2019 10:45 AM Jillyn Canard, PTA [facility]   7/18/2019 10:45 AM Jillyn Canard, PTA [facility]   7/23/2019 10:45 AM Jillyn Canard, PTA [facility]   7/25/2019 10:45 AM Lindyyn Canard, PTA [facility]   7/30/2019 10:45 AM Jillyn Canard, PTA [facility]   8/1/2019 10:45 AM Jillyn Canyousuf, PTA [facility]   10/22/2019  9:30 AM [facility]   10/29/2019  9:45 AM Rama Chatterjee MD [place] [facility]

## 2019-07-08 ENCOUNTER — HOSPITAL ENCOUNTER (OUTPATIENT)
Dept: PHYSICAL THERAPY | Age: 74
Discharge: HOME | End: 2019-07-08
Payer: MEDICARE

## 2019-07-08 PROCEDURE — 97113 AQUATIC THERAPY/EXERCISES: CPT

## 2019-07-08 NOTE — PROGRESS NOTES
Talha Collins  : 1945  [place] / Product Type: Medicare /  [address] at [facility] RICHARD MITCHELL  [address] [facility], [address], [address] [place] [address]  [phone]   [phone]       OUTPATIENT PHYSICAL THERAPY: Daily Treatment Note and progress note 2019     ICD-10: Treatment Diagnosis: M48.062 spinal stenosis of lumbar region with neurogenic claudication, M43.10- degenerative spondylolisthesis  Precautions/Allergies:   Adhesive; Clindamycin; Dilaudid [hydromorphone]; and Keflex [cephalexin]   MD Orders: Evaluate and treat, modalities, core strengthening and stretching MEDICAL/REFERRING DIAGNOSIS:  Low back pain   DATE OF ONSET:   REFERRING PHYSICIAN: Justine JUÁREZ  RETURN PHYSICIAN APPOINTMENT: prn       Pre-treatment Symptoms/Complaints:   Pt states she is sore in her legs and low back a little bit  Pain: Initial:   3/10 Post Session:  Not rated    Medications Last Reviewed:  2019  She has attended 20 visits and is appropriate to continue with plan of care through this certification period and with goals below  Updated Objective Findings:   None Today  GOALS: (Goals have been discussed and agreed upon with patient.)  Short-Term Functional Goals: Time Frame: 4 weeks  1. Pt independent with HEP to address deficits. 19 MET  2. Pt to perform 45 minutes aquatic therapy 2/week consistently. 19 MET  3. Pt to report a decrease in her symptoms. 19  MET  4. Assess balance and timed up and go. Ongoing 19  5. Administer LEFS  Ongoing 19  Discharge Goals: Time Frame: 12 weeks  Ongoing 19  1. L hip flexor strength 4/5 to support gait activities. 2.  Pt to ascend and descend 3 steps with one railing. 3.  Pt able to lift L LE onto mat table or bed without use of UE's. 4.  Pt indpendent with HEP for maintenance of gains made in PT.  5. Improve LEFS by 10 points.    TREATMENT:     Aquatic Therapy (45 minutes): Aquatic treatment performed per flow grid for Decreased muscle strength, Decreased endurance, Decreased range of motion, Ease of movement and Low impact and reduced weight bearing activity. Cues provided for posture and balance. Aquatic Exercise Log       Date  6/13/19 Date  6/18/19 Date  6/20/19 Date  6/25/19 Date  7/1/19 7/8/19   Activity/ Exercise         Walking forward 6 6 6 6 6 6   Walking backward 6 6 6 6 6 6   Walking sideways 6 6 6 6 6 6     Marching 6 6 6 6 6 6     Goose Step 6 6 6 6 6 6     Tip toes 3 3 3 3 3 3     Heels 3 3 3 3 3 3     Lunges Long step 6 Long step 6 Long step 6 Long step 6 Long step 6 Long step 6   Side step squats         LE Exercises 3. 5' holding rail 3. 5' holding rail  3. 5' holding rail 3. 5' holding rail  4.5 holding rail 3.5 holding to rail with big noodle     Hip Flex/Ext 20 with purple noodle 20 with large noodle 20 with large noodle  20 with large noodle 20 with small noodle 20x     Hip Abd/Add 20 with purple noodle 20 with large noodle  20 with large noodle 20 with large noodle 20 with small noodle 20x     Hip IR/ER           Calf raises           Knee Flex 15 with 3 overstretch  20 with 3 over stretch 20 with 3 overstretch 20 with 3 overstretch  20 with 3 overstretch 20 with overstretch x 3     Squats           Leg Circles 15/15 20/20 20/20 20/20 20/20 20/20     Step Ups 15 20 B 20 B 20 B 20 B top step into 4.5'  5 B first step going out of pool without holding on 20 B top step into 4.5'  5 B first step going out of pool without holding on   UE Exercises           Squeeze In           Push Down           Pull Down           Bicep/Tricep         Rows/Press outs          Chi Positions           Trunk Rotation         Deep H2O/ Noodles 7' with noodle and asssist 7' with noodle and assist  7' with noodle and assist 7' with noodle and assist 7' with noodle and assist 7' with noodle and assist     Stabilization Combo 3 min Combo 3 min Combo 3 min Combo 3 min Combo 3 min      Arms only           Legs only 3 min 3 min 3 min 3 min 3 min 3 min   [address] 3 min 3 min 3 min 3 min 3 min 3 min     Scissors 3 min 3 min 3 min 3 min 3 min 3 min     Crab walk         Lower abdominal   work  DKTC 3 min DKTC 3 min DKTC 3 min DKTC 3 min  Dktc x 3 min     Cardio           Jogging         Lap   Swimming           Stretches           Hamstrings           Heelcords           Hip flex/ rotators           Neck           [employer] Portal    Treatment/Session Summary:    · Response to Treatment:   Continues to work hard in pool. Balance is improved. Continues with tightness in hips and quads calves entire LE's  · Communication/Consultation:  None today  · Equipment provided today:  None today  · Recommendations/Intent for next treatment session: Next visit will focus on progressing exercises and gait. Effective Dates: 5/2/2019 TO 8/4/2019 (90 days).   Frequency/Duration: 2 times a week for 90 Day(s)    Visit Count:  5  Total Treatment Billable Duration:  45 minutes  PT Patient Time In/Time Out  Time In: 1000  Time Out: [address], PT    Future Appointments   Date Time Provider Tai Fisher   7/8/2019 10:15 AM Ke Emperor, PT [facility]   7/11/2019 10:45 AM Geraldean Darting, PTA [facility]   7/16/2019 10:45 AM Geraldean Darting, PTA [facility]   7/18/2019 10:45 AM Geraldean Darting, PTA [facility]   7/23/2019 10:45 AM Geraldean Darting, PTA [facility]   7/25/2019 10:45 AM Geraldean Darting, PTA [facility]   7/30/2019 10:45 AM Geraldean Darting, PTA [facility]   8/1/2019 10:45 AM Geraldean Darting, PTA [facility]   10/22/2019  9:30 AM [facility]   10/29/2019  9:45 AM Krystyna Mcghee MD [facility]

## 2019-07-09 ENCOUNTER — APPOINTMENT (OUTPATIENT)
Dept: PHYSICAL THERAPY | Age: 74
End: 2019-07-09
Payer: MEDICARE

## 2019-07-11 ENCOUNTER — HOSPITAL ENCOUNTER (OUTPATIENT)
Dept: PHYSICAL THERAPY | Age: 74
Discharge: HOME | End: 2019-07-11
Payer: MEDICARE

## 2019-07-11 PROCEDURE — 97113 AQUATIC THERAPY/EXERCISES: CPT

## 2019-07-11 NOTE — PROGRESS NOTES
Yi Francie  : 1945  [place] / Product Type: Medicare /  [address] at [facility] RICHARD MITCHELL  [address] [facility] ([place], [place]), [address] [place] [address],[address], [address] [facility]  [phone]   [phone]       OUTPATIENT PHYSICAL THERAPY: Daily Treatment Note 2019     ICD-10: Treatment Diagnosis: M48.062 spinal stenosis of lumbar region with neurogenic claudication, M43.10- degenerative spondylolisthesis  Precautions/Allergies:   Adhesive; Clindamycin; Dilaudid [hydromorphone]; and Keflex [cephalexin]   MD Orders: Evaluate and treat, modalities, core strengthening and stretching MEDICAL/REFERRING DIAGNOSIS:  Low back pain   DATE OF ONSET:   REFERRING PHYSICIAN: Lisa Singh  RETURN PHYSICIAN APPOINTMENT: prn       Pre-treatment Symptoms/Complaints:   Pt states she was a little sore from PT last session. Pain: Initial:   3/10 Post Session:  Not rated    Medications Last Reviewed:  2019    Updated Objective Findings:   None Today     TREATMENT:     Aquatic Therapy (45 minutes): Aquatic treatment performed per flow grid for Decreased muscle strength, Decreased endurance, Decreased range of motion, Ease of movement and Low impact and reduced weight bearing activity. Cues provided for posture and balance. Aquatic Exercise Log       19 Date  19   Activity/ Exercise     Walking forward 6 8   Walking backward 6 8   Walking sideways 6 8     Marching 6 8     Goose Step 6 8     Tip toes 3 3     Heels 3 3     Lunges Long step 6 Long step 8   Side step squats     LE Exercises 3.5 holding to rail with big noodle 3. 5' holding to rail with big noodle     Hip Flex/Ext 20x 20 B     Hip Abd/Add 20x 20 B     Hip IR/ER       Calf raises       Knee Flex 20 with overstretch x 3 20 with overstretch x3     Squats       Leg Circles 20/20 20/20 B no noodle     Step Ups 20 B top step into 4.5'  5 B first step going out of pool without holding on 20 B top step into 4.5'   UE Exercises  Purple noodle     Squeeze In  10     Push Down  10     Pull Down  10     Bicep/Tricep  10   Rows/Press outs  10/10    Chi Positions       Trunk Rotation     Deep H2O/ Noodles 7' with noodle and assist 7' with noodle and assist     Stabilization       Arms only       Legs only 3 min 3 min   [place] 3 min 3 min     Scissors 3 min 3 min     Crab walk     Lower abdominal   work  Dktc x 3 min DKTC 3 min      Cardio       Jogging     Lap   Swimming       Stretches       Hamstrings       Heelcords       Hip flex/ rotators       Neck       [facility] Portal    Treatment/Session Summary:    · Response to Treatment:   Pt continues to do well with therapy. She states she is able to get her leg in and out of the car now. · Communication/Consultation:  None today  · Equipment provided today:  None today  · Recommendations/Intent for next treatment session: Next visit will focus on progressing exercises and gait. Effective Dates: 5/2/2019 TO 8/4/2019 (90 days).   Frequency/Duration: 2 times a week for 90 Day(s)    Visit Count:  5  Total Treatment Billable Duration:  45 minutes  PT Patient Time In/Time Out  Time In: 1030  Time Out: [address] [place], PTA    Future Appointments   Date Time Provider Tai Fisher   7/16/2019 10:45 AM Alverna Opoka, PTA [facility]   7/18/2019 10:45 AM Alverna Opoka, PTA [facility]   7/23/2019 10:45 AM Alverna Opoka, PTA [facility]   7/25/2019 10:45 AM Alverna Opoka, PTA [facility]   7/30/2019 10:45 AM Alverna Opoka, PTA [facility]   8/1/2019 10:45 AM Alverna Opoka, PTA [facility]   8/6/2019 10:30 AM Carmen Caraballo, PT [facility]   10/22/2019  9:30 AM 10 [facility] LAB [facility] 10 [facility] 10 [facility]   10/29/2019  9:45 AM Odalys Carmona MD [facility] 10 [facility] 10 [facility]

## 2019-07-16 ENCOUNTER — HOSPITAL ENCOUNTER (OUTPATIENT)
Dept: PHYSICAL THERAPY | Age: 74
Discharge: HOME | End: 2019-07-16
Payer: MEDICARE

## 2019-07-16 PROCEDURE — 97113 AQUATIC THERAPY/EXERCISES: CPT

## 2019-07-16 NOTE — PROGRESS NOTES
Verenice Jo  : 1945  [place] / Product Type: Medicare /  [address] at [facility] RICHARD MITCHELL  [address] [place] [address], [address], [address] [place] [address]  [phone]   [phone]       OUTPATIENT PHYSICAL THERAPY: Daily Treatment Note 2019     ICD-10: Treatment Diagnosis: M48.062 spinal stenosis of lumbar region with neurogenic claudication, M43.10- degenerative spondylolisthesis  Precautions/Allergies:   Adhesive; Clindamycin; Dilaudid [hydromorphone]; and Keflex [cephalexin]   MD Orders: Evaluate and treat, modalities, core strengthening and stretching MEDICAL/REFERRING DIAGNOSIS:  Low back pain   DATE OF ONSET:   REFERRING PHYSICIAN: Jessi Jamison*  RETURN PHYSICIAN APPOINTMENT: prn       Pre-treatment Symptoms/Complaints:   Pt with no complaints this morning. Pain: Initial:   3/10 Post Session:  Not rated    Medications Last Reviewed:  2019    Updated Objective Findings:   None Today     TREATMENT:     Aquatic Therapy (45 minutes): Aquatic treatment performed per flow grid for Decreased muscle strength, Decreased endurance, Decreased range of motion, Ease of movement and Low impact and reduced weight bearing activity. Cues provided for posture and balance. Aquatic Exercise Log       19 Date  19   Activity/ Exercise      Walking forward 6 8 8   Walking backward 6 8 8   Walking sideways 6 8 8     Marching 6 8 8     Goose Step 6 8 8     Tip toes 3 3 3     Heels 3 3 3     Lunges Long step 6 Long step 8 Long step 8   Side step squats      LE Exercises 3.5 holding to rail with big noodle 3. 5' holding to rail with big noodle 3. 5' holding to rail with big noodle      Hip Flex/Ext 20x 20 B 20 B     Hip Abd/Add 20x 20 B 20 B     Hip IR/ER        Calf raises        Knee Flex 20 with overstretch x 3 20 with overstretch x3 20 with overstretch x3     Squats        Leg Circles 20/20 20/20 B no noodle 20/20 B no noodle      Step Ups 20 B top step into 4.5'  5 B first step going out of pool without holding on 20 B top step into 4.5' 20 B top step into 4.5'    UE Exercises  Purple noodle Purple noodle     Squeeze In  10 10     Push Down  10 10     Pull Down  10 10     Bicep/Tricep  10 10   Rows/Press outs  10/10 10/10    Chi Positions        Trunk Rotation      Deep H2O/ Noodles 7' with noodle and assist 7' with noodle and assist 7' with noodle and assist      Stabilization        Arms only        Legs only 3 min 3 min 3 min   [address] 3 min 3 min 3 min     Scissors 3 min 3 min 3 min     Crab walk      Lower abdominal   work  Dktc x 3 min DKTC 3 min  [facility] 3 min     Cardio        Jogging      Lap   Swimming        Stretches        Hamstrings        Heelcords        Hip flex/ rotators        Neck        [facility] Portal    Treatment/Session Summary:    · Response to Treatment:   She continues to do well with aquatic. · Communication/Consultation:  None today  · Equipment provided today:  None today  · Recommendations/Intent for next treatment session: Next visit will focus on progressing exercises and gait. Effective Dates: 5/2/2019 TO 8/4/2019 (90 days).   Frequency/Duration: 2 times a week for 90 Day(s)    Visit Count:  5  Total Treatment Billable Duration:  45 minutes  PT Patient Time In/Time Out  Time In: 1030  Time Out: [address], PTA    Future Appointments   Date Time Provider Tai Fisher   7/18/2019 10:45 AM Virgie Martin PTA [facility]   7/23/2019 10:45 AM Virgie Martin PTA [facility]   7/25/2019 10:45 AM Virgie Martin PTA [facility]   7/30/2019 10:45 AM Virgie Martin PTA [facility]   8/1/2019 10:45 AM Virgie Martin PTA [facility]   8/6/2019 10:30 [facility]   10/22/2019  9:30 AM [facility] LAB [facility]   10/29/2019  9:45 AM Cynthia Angeles MD [facility]

## 2019-07-18 ENCOUNTER — HOSPITAL ENCOUNTER (OUTPATIENT)
Dept: PHYSICAL THERAPY | Age: 74
Discharge: HOME | End: 2019-07-18
Payer: MEDICARE

## 2019-07-18 PROCEDURE — 97113 AQUATIC THERAPY/EXERCISES: CPT

## 2019-07-18 NOTE — PROGRESS NOTES
Jc Boyer  : 1945  [place] / Product Type: Medicare /  [address]  at [address] [place] [address] Rd  1101 [facility], [address], [place] [address].  [phone]   [phone]       OUTPATIENT PHYSICAL THERAPY: Daily Treatment Note 2019     ICD-10: Treatment Diagnosis: M48.062 spinal stenosis of lumbar region with neurogenic claudication, M43.10- degenerative spondylolisthesis  Precautions/Allergies:   Adhesive; Clindamycin; Dilaudid [hydromorphone]; and Keflex [cephalexin]   MD Orders: Evaluate and treat, modalities, core strengthening and stretching MEDICAL/REFERRING DIAGNOSIS:  Low back pain   DATE OF ONSET:   REFERRING PHYSICIAN: Molly Lacey*  RETURN PHYSICIAN APPOINTMENT: prn       Pre-treatment Symptoms/Complaints:   Pt states she is tired this morning. Pain: Initial:   3/10 Post Session:  Not rated    Medications Last Reviewed:  2019    Updated Objective Findings:   None Today     TREATMENT:     Aquatic Therapy (45 minutes): Aquatic treatment performed per flow grid for Decreased muscle strength, Decreased endurance, Decreased range of motion, Ease of movement and Low impact and reduced weight bearing activity. Cues provided for posture and balance. Aquatic Exercise Log       19 Date  19 Date  19 Date  19   Activity/ Exercise       Walking forward 6 8 8 8   Walking backward 6 8 8 8   Walking sideways 6 8 8 8     Marching 6 8 8 8     Goose Step 6 8 8 8     Tip toes 3 3 3 3     Heels 3 3 3 3     Lunges Long step 6 Long step 8 Long step 8 Long step 8   Side step squats       LE Exercises 3.5 holding to rail with big noodle 3. 5' holding to rail with big noodle 3. 5' holding to rail with big noodle  3. 5' holding rail with big noodle     Hip Flex/Ext 20x 20 B 20 B 20 B     Hip Abd/Add 20x 20 B 20 B 20 B     Hip IR/ER         Calf raises         Knee Flex 20 with overstretch x 3 20 with overstretch x3 20 with overstretch x3 20 with over stretch x3     Squats         Leg Circles 20/20 20/20 B no noodle 20/20 B no noodle  20/20 B no noodle     Step Ups 20 B top step into 4.5'  5 B first step going out of pool without holding on 20 B top step into 4.5' 20 B top step into 4.5'  15 bottom step in 3.5'    UE Exercises  Purple noodle Purple noodle      Squeeze In  10 10      Push Down  10 10      Pull Down  10 10      Bicep/Tricep  10 10    Rows/Press outs  10/10 10/10     Chi Positions         Trunk Rotation       Deep H2O/ Noodles 7' with noodle and assist 7' with noodle and assist 7' with noodle and assist  7' with noodle and assist      Stabilization         Arms only         Legs only 3 min 3 min 3 min 3 min   [address] 3 min 3 min 3 min 3 min     Scissors 3 min 3 min 3 min 3 min     Crab walk       Lower abdominal   work  Dktc x 3 min DKTC 3 min  DTKC 3 min DKTC 3 min     Cardio         Jogging       Lap   Swimming         Stretches         Hamstrings         Heelcords         Hip flex/ rotators         Neck         Med[employer] Portal    Treatment/Session Summary:    · Response to Treatment:   She continues to do well with aquatic. · Communication/Consultation:  None today  · Equipment provided today:  None today  · Recommendations/Intent for next treatment session: Next visit will focus on progressing exercises and gait. Effective Dates: 5/2/2019 TO 8/4/2019 (90 days).   Frequency/Duration: 2 times a week for 90 Day(s)    Visit Count:  5  Total Treatment Billable Duration:  45 minutes  PT Patient Time In/Time Out  Time In: 1856  Time Out: 1400 [facility]    Future Appointments   Date Time Provider Tai Fisher   7/23/2019 10:45 AM Lauren Pak PTA [facility]   7/25/2019 10:45 AM MAKSIM Zimmer [facility]   7/30/2019 10:45 [facility]   8/1/2019 10:45 AM MAKSIM Zimmer [facility]   8/6/2019 10:30 AM Lia Lucas, PT [facility]   10/22/2019  9:30 AM [employer] LAB [facility]   10/29/2019  9:45 AM Marlene Manzo MD [place] [facility]

## 2019-07-20 NOTE — ROUTINE PROCESS
END OF SHIFT NOTE:    INTAKE/OUTPUT  03/29 0701 - 03/30 0700  In: 2029 [P.O.:360; I.V.:1669]  Out: 970 [Urine:925; Drains:30]  Voiding: YES  Catheter: NO  Drain:   Lana Reza #1 03/29/18 Left Abdomen (Active)   Site Assessment Clean, dry, & intact 3/30/2018 12:03 AM   Dressing Status Clean, dry, & intact 3/30/2018 12:03 AM   Drainage Description Serosanguinous 3/30/2018 12:03 AM   Avtar Drain Airleak No 3/30/2018 12:03 AM   Status Patent; Charged;Draining;Suction (specify 3/30/2018 12:03 AM   Y Connector Used No 3/30/2018 12:03 AM   Drainage Chamber Level (ml) 0 ml 3/29/2018  3:30 PM   Output (ml) 0 ml 3/30/2018 12:03 AM               Flatus: Patient does not have flatus present. Stool:  0 occurrences. Characteristics:       Emesis: 0 occurrences. Characteristics:        VITAL SIGNS  Patient Vitals for the past 12 hrs:   Temp Pulse Resp BP SpO2   03/30/18 0714 97.9 °F (36.6 °C) 72 17 115/70 95 %   03/30/18 0328 98.4 °F (36.9 °C) 76 18 116/70 97 %   03/29/18 2357 98.7 °F (37.1 °C) 82 18 136/80 97 %   03/29/18 1947 98.7 °F (37.1 °C) 74 18 126/75 97 %       Pain Assessment  Pain Intensity 1: 4 (03/30/18 0610)  Pain Location 1: Abdomen  Pain Intervention(s) 1: Medication (see MAR)  Patient Stated Pain Goal: 4    Ambulating  Yes    Shift report given to oncoming nurse at the bedside.     Suad Cochran LPN
DISPLAY PLAN FREE TEXT

## 2019-07-23 ENCOUNTER — HOSPITAL ENCOUNTER (OUTPATIENT)
Dept: PHYSICAL THERAPY | Age: 74
Discharge: HOME | End: 2019-07-23
Payer: MEDICARE

## 2019-07-23 PROCEDURE — 97113 AQUATIC THERAPY/EXERCISES: CPT

## 2019-07-23 NOTE — PROGRESS NOTES
Yi Francie  : 1945  [place] / Product Type: Medicare /  [address]  at [facility] RICHARD MITCHELL  [address] [place] [address], [address], [place] [address].  [phone]   [phone]       OUTPATIENT PHYSICAL THERAPY: Daily Treatment Note 2019     ICD-10: Treatment Diagnosis: M48.062 spinal stenosis of lumbar region with neurogenic claudication, M43.10- degenerative spondylolisthesis  Precautions/Allergies:   Adhesive; Clindamycin; Dilaudid [hydromorphone]; and Keflex [cephalexin]   MD Orders: Evaluate and treat, modalities, core strengthening and stretching MEDICAL/REFERRING DIAGNOSIS:  Low back pain   DATE OF ONSET:   REFERRING PHYSICIAN: Jose Angel Singh*  RETURN PHYSICIAN APPOINTMENT: prn       Pre-treatment Symptoms/Complaints:   Pt states she is having issues with her sciatica pain since . Pain: Initial:   3/10 Post Session:  Not rated    Medications Last Reviewed:  2019    Updated Objective Findings:   None Today     TREATMENT:     Aquatic Therapy (45 minutes): Aquatic treatment performed per flow grid for Decreased muscle strength, Decreased endurance, Decreased range of motion, Ease of movement and Low impact and reduced weight bearing activity. Cues provided for posture and balance. Aquatic Exercise Log       19 Date  19 Date  19 Date  19 Date  19   Activity/ Exercise        Walking forward 6 8 8 8 8   Walking backward 6 8 8 8 8   Walking sideways 6 8 8 8 8     Marching 6 8 8 8 8     Goose Step 6 8 8 8 8     Tip toes 3 3 3 3 3     Heels 3 3 3 3 3     Lunges Long step 6 Long step 8 Long step 8 Long step 8 Long step 8   Side step squats        LE Exercises 3.5 holding to rail with big noodle 3. 5' holding to rail with big noodle 3. 5' holding to rail with big noodle  3. 5' holding rail with big noodle 3. 5' holding rail with big noodle     Hip Flex/Ext 20x 20 B 20 B 20 B 20 B     Hip Abd/Add 20x 20 B 20 B 20 B 20 B     Hip IR/ER          Calf raises          Knee Flex 20 with overstretch x 3 20 with overstretch x3 20 with overstretch x3 20 with over stretch x3 20 with over stretch x 3     Squats          Leg Circles 20/20 20/20 B no noodle 20/20 B no noodle  20/20 B no noodle 20/20 B no noodle     Step Ups 20 B top step into 4.5'  5 B first step going out of pool without holding on 20 B top step into 4.5' 20 B top step into 4.5'  15 bottom step in 3.5'  20 small step in 4.5'   UE Exercises  Purple noodle Purple noodle  Purple noodle     Squeeze In  10 10  10     Push Down  10 10  10     Pull Down  10 10  10     Bicep/Tricep  10 10  10   Rows/Press outs  10/10 10/10  10/10    Chi Positions          Trunk Rotation        Deep H2O/ Noodles 7' with noodle and assist 7' with noodle and assist 7' with noodle and assist  7' with noodle and assist  7' with noodle and assist     Stabilization          Arms only          Legs only 3 min 3 min 3 min 3 min 3 min   As[address] 93 3 min 3 min 3 min 3 min 3 min     Scissors 3 min 3 min 3 min 3 min 3 min     Crab walk        Lower abdominal   work  Dktc x 3 min DKTC 3 min  DTKC 3 min DKTC 3 min DKTC 3 min     Cardio          Jogging        Lap   Swimming          Stretches          Hamstrings          Heelcords          Hip flex/ rotators          Neck          MedBridge Portal    Treatment/Session Summary:    · Response to Treatment:   Pt doing well. She did require minimal assist to get out of the pool today. · Communication/Consultation:  None today  · Equipment provided today:  None today  · Recommendations/Intent for next treatment session: Next visit will focus on progressing exercises and gait. Effective Dates: 5/2/2019 TO 8/4/2019 (90 days).   Frequency/Duration: 2 times a week for 90 Day(s)    Visit Count:  5  Total Treatment Billable Duration:  45 minutes  PT Patient Time In/Time Out  Time In: 1030  Time Out: 3000 [facility], PTA    Future Appointments   Date Time Provider Tai Natalia   7/25/2019 10:45 AM Lauren Pak PTA [facility]   7/30/2019 10:45 AM MA[facility]IM Zimmer [facility]   8/1/2019 10:45 AM [facility]   8/6/2019 10:30 AM [facility]   10/22/2019  9:30 AM [facility] LAB [facility]   10/29/2019  9:45 AM Mo Parker MD [place] [facility]

## 2019-07-25 ENCOUNTER — HOSPITAL ENCOUNTER (OUTPATIENT)
Dept: PHYSICAL THERAPY | Age: 74
Discharge: HOME | End: 2019-07-25
Payer: MEDICARE

## 2019-07-25 NOTE — PROGRESS NOTES
Talha Collins  : 1945  [place] / Product Type: Medicare /  [address] at [facility] RICHARD MITCHELL  [address] [place] [address], [address], [address] [place] [address]  [phone]   [phone]       OUTPATIENT PHYSICAL THERAPY: Cancellation Note 2019     ICD-10: Treatment Diagnosis: M48.062 spinal stenosis of lumbar region with neurogenic claudication, M43.10- degenerative spondylolisthesis  Precautions/Allergies:   Adhesive; Clindamycin; Dilaudid [hydromorphone]; and Keflex [cephalexin]   MD Orders: Evaluate and treat, modalities, core strengthening and stretching MEDICAL/REFERRING DIAGNOSIS:  Low back pain   DATE OF ONSET:   REFERRING PHYSICIAN: Justine LOZA*  RETURN PHYSICIAN APPOINTMENT: prn     Pt called to cancel appointment due to being sick. Will follow up with pt on next visit.      Maximino Mauro, PTA

## 2019-07-30 ENCOUNTER — HOSPITAL ENCOUNTER (OUTPATIENT)
Dept: PHYSICAL THERAPY | Age: 74
Discharge: HOME | End: 2019-07-30
Payer: MEDICARE

## 2019-07-30 PROCEDURE — 97113 AQUATIC THERAPY/EXERCISES: CPT

## 2019-07-30 NOTE — PROGRESS NOTES
Toby Pair  : 1945  [place] / Product Type: Medicare /  [address] at [facility] RICHARD MITCHELL  [address] [place] [address], [address], [place] [address].  [phone]   [phone]       OUTPATIENT PHYSICAL THERAPY: Daily Treatment Note 2019     ICD-10: Treatment Diagnosis: M48.062 spinal stenosis of lumbar region with neurogenic claudication, M43.10- degenerative spondylolisthesis  Precautions/Allergies:   Adhesive; Clindamycin; Dilaudid [hydromorphone]; and Keflex [cephalexin]   MD Orders: Evaluate and treat, modalities, core strengthening and stretching MEDICAL/REFERRING DIAGNOSIS:  Low back pain   DATE OF ONSET:   REFERRING PHYSICIAN: Haleigh Segura*  RETURN PHYSICIAN APPOINTMENT: prn       Pre-treatment Symptoms/Complaints:   Pt with no new complaints today. Pain: Initial:   3/10 Post Session:  Not rated    Medications Last Reviewed:  2019    Updated Objective Findings:   None Today     TREATMENT:     Aquatic Therapy (45 minutes): Aquatic treatment performed per flow grid for Decreased muscle strength, Decreased endurance, Decreased range of motion, Ease of movement and Low impact and reduced weight bearing activity. Cues provided for posture and balance. Aquatic Exercise Log       Date  19 Date  19   Activity/ Exercise     Walking forward 8 8   Walking backward 8 8   Walking sideways 8 8     Marching 8 8     Goose Step 8 8     Tip toes 3 3     Heels 3 3     Lunges Long step 8 Long step 8   Side step squats     LE Exercises 3. 5' holding rail with big noodle 3. 5' holding rail with big noodle     Hip Flex/Ext 20 B 20 B     Hip Abd/Add 20 B 20 B     Hip IR/ER       Calf raises       Knee Flex 20 with over stretch x 3 20 with over stretch x3     Squats       Leg Circles 20/20 B no noodle 20/20 B no noodle     Step Ups 20 small step in 4.5' 20 small step in 4.5'   UE Exercises Purple noodle Purple noodle Squeeze In 10 10     Push Down 10 10     Pull Down 10 10     Bicep/Tricep 10 10   Rows/Press outs 10/10 10/10    Chi Positions       Trunk Rotation     Deep H2O/ Noodles 7' with noodle and assist 7' with noodle and assist      Stabilization       Arms only       Legs only 3 min 3 min   Askel[address] 93 3 min 3 min     Scissors 3 min 3 min     Crab walk     Lower abdominal   work  DKTC 3 min DKTC 3 min      Cardio       Jogging     Lap   Swimming       Stretches       Hamstrings       Heelcords       Hip flex/ rotators       Neck       MedBridge Portal    Treatment/Session Summary:    · Response to Treatment:   Pt continues to do well with pool therapy. · Communication/Consultation:  None today  · Equipment provided today:  None today  · Recommendations/Intent for next treatment session: Next visit will focus on progressing exercises and gait. Effective Dates: 5/2/2019 TO 8/4/2019 (90 days).   Frequency/Duration: 2 times a week for 90 Day(s)    Visit Count:  5  Total Treatment Billable Duration:  45 minutes  PT Patient Time In/Time Out  Time In: 1030  Time Out: [address] MAKSIM Camara    Future Appointments   Date Time Provider Tai Fisher   8/1/2019 10:45 AM Lucille Hyatt PTA [facility]   8/6/2019 10:30 AM Janice Cook PT [facility]   10/22/2019  9:30 AM [facility]   10/29/2019  9:45 AM Corbin Taylor MD [place] [facility]

## 2019-08-01 ENCOUNTER — HOSPITAL ENCOUNTER (OUTPATIENT)
Dept: PHYSICAL THERAPY | Age: 74
Discharge: HOME | End: 2019-08-01
Payer: MEDICARE

## 2019-08-01 PROCEDURE — 97113 AQUATIC THERAPY/EXERCISES: CPT

## 2019-08-01 NOTE — PROGRESS NOTES
Izabella Murcia  : 1945  [place] / Product Type: Medicare /  [address]  at [address] [place] [address] Rd  [address] [facility], [address], [place] [address].  [phone]   [phone]       OUTPATIENT PHYSICAL THERAPY: Daily Treatment Note 2019     ICD-10: Treatment Diagnosis: M48.062 spinal stenosis of lumbar region with neurogenic claudication, M43.10- degenerative spondylolisthesis  Precautions/Allergies:   Adhesive; Clindamycin; Dilaudid [hydromorphone]; and Keflex [cephalexin]   MD Orders: Evaluate and treat, modalities, core strengthening and stretching MEDICAL/REFERRING DIAGNOSIS:  Low back pain   DATE OF ONSET:   REFERRING PHYSICIAN: Nilo Mcghee*  RETURN PHYSICIAN APPOINTMENT: prn       Pre-treatment Symptoms/Complaints:   Pt states she was tired today. Pain: Initial:   3/10 Post Session:  Not rated    Medications Last Reviewed:  2019    Updated Objective Findings:   None Today     TREATMENT:     Aquatic Therapy (45 minutes): Aquatic treatment performed per flow grid for Decreased muscle strength, Decreased endurance, Decreased range of motion, Ease of movement and Low impact and reduced weight bearing activity. Cues provided for posture and balance. Aquatic Exercise Log       Date  19 Date  19 Date  19   Activity/ Exercise      Walking forward 8 8 8   Walking backward 8 8 8   Walking sideways 8 8 8     Marching 8 8 8     Goose Step 8 8 8     Tip toes 3 3 3     Heels 3 3 3     Lunges Long step 8 Long step 8 Long step 8   Side step squats      LE Exercises 3. 5' holding rail with big noodle 3. 5' holding rail with big noodle 3. 5' holding rail with big noodle      Hip Flex/Ext 20 B 20 B 20 B     Hip Abd/Add 20 B 20 B 20 B     Hip IR/ER        Calf raises        Knee Flex 20 with over stretch x 3 20 with over stretch x3 20 with over stretch x3      Squats        Leg Circles 20/20 B no noodle 20/20 B no noodle 20/20 B no noodle     Step Ups 20 small step in 4.5' 20 small step in 4.5' 20 small step in 4.5'    UE Exercises Purple noodle Purple noodle      Squeeze In 10 10      Push Down 10 10      Pull Down 10 10      Bicep/Tricep 10 10    Rows/Press outs 10/10 10/10     Chi Positions        Trunk Rotation      Deep H2O/ Noodles 7' with noodle and assist 7' with noodle and assist  7' with noodle and assist      Stabilization        Arms only        Legs only 3 min 3 min 3 min   [address] 93 3 min 3 min 3 min     Scissors 3 min 3 min 3 min     Crab walk      Lower abdominal   work  DKTC 3 min DKTC 3 min  DKTC 3 min     Cardio        Jogging      Lap   Swimming        Stretches        Hamstrings        Heelcords        Hip flex/ rotators        Neck        [employer] Portal    Treatment/Session Summary:    · Response to Treatment:   Continues to do well. · Communication/Consultation:  None today  · Equipment provided today:  None today  · Recommendations/Intent for next treatment session: Next visit will focus on progressing exercises and gait. Effective Dates: 5/2/2019 TO 8/4/2019 (90 days).   Frequency/Duration: 2 times a week for 90 Day(s)    Visit Count:  5  Total Treatment Billable Duration:  45 minutes  PT Patient Time In/Time Out  Time In: 1030  Time Out: [address], PTA    Future Appointments   Date Time Provider Tai Fisher   8/6/2019 10:30 AM Roshan Fajardo PT [facility]   10/22/2019  9:30 AM [facility] LAB [facility]   10/29/2019  9:45 AM Delmer Andujar MD [place] [facility]

## 2019-08-06 ENCOUNTER — HOSPITAL ENCOUNTER (OUTPATIENT)
Dept: PHYSICAL THERAPY | Age: 74
Discharge: HOME | End: 2019-08-06
Payer: MEDICARE

## 2019-08-06 PROCEDURE — 97110 THERAPEUTIC EXERCISES: CPT

## 2019-08-06 NOTE — THERAPY RECERTIFICATION
Sumanth Delgadillo  : 1945  [place]  Secondary: Bshsi Generic [address]  at [facility]  1101 [facility], [address] [place] [address],[address], [place] [address].  [phone]   [phone]       OUTPATIENT PHYSICAL THERAPY:Recertification and and Daily Note 2019     ICD-10: Treatment Diagnosis: M48.062 spinal stenosis of lumbar region with neurogenic claudication, M43.10- degenerative spondylolisthesis  Precautions/Allergies:   Adhesive; Clindamycin; Dilaudid [hydromorphone]; and Keflex [cephalexin]   MD Orders: Evaluate and treat, modalities, core strengthening and stretching MEDICAL/REFERRING DIAGNOSIS:  Low back pain   DATE OF ONSET:   REFERRING PHYSICIAN: Omsany JUÁREZ  RETURN PHYSICIAN APPOINTMENT: prn     INITIAL ASSESSMENT:  Ms. Cm Guzman presents after 30 visits of therapy. She has made improvement and met many of the goals below. She continues to have significant weakness and debility. She works hard and is a good candidate to continue with this plan of care toward reaching remaining goals. We will plan to combine land and aquatic therapy to work toward goals below. PROBLEM LIST (Impacting functional limitations):  1. Decreased Strength  2. Decreased Ambulation Ability/Technique  3. Decreased Balance  4. Increased Pain  5. Decreased Activity Tolerance  6. Decreased Flexibility/Joint Mobility  7. Decreased [place] with Home Exercise Program INTERVENTIONS PLANNED: (Treatment may consist of any combination of the following)  1. Home Exercise Program (HEP)  2. Manual Therapy  3. Therapeutic Exercise/Strengthening  4. aquatic therapy   TREATMENT PLAN:  Effective Dates: 2019 TO 2019 (90 days). Frequency/Duration: 2 times a week for 90 Day(s)  GOALS: (Goals have been discussed and agreed upon with patient.)  Short-Term Functional Goals: Time Frame: 4 weeks  1. Pt independent with HEP to address deficits.   MET but requires updating  8/6/19 updated   2. Pt to perform 45 minutes aquatic therapy 2/week consistently. MET 8/6/19  3. Pt to report a decrease in her symptoms. MET 8/6/19  Reports occasional back pain- less than 1/week  4. Assess balance and timed up and go. MET 8/6/19  TUG  14 sec  5. Administer LEFS  MET  8/6/19  Discharge Goals: Time Frame: 12 weeks  1. L hip flexor strength 4/5 to support gait activities. 2.  Pt to ascend and descend 3 steps with one railing. ongoing able to ascend one step with 1 railing 8/6/19  3. Pt able to lift L LE onto mat table or bed without use of UE's. MET 8/6/19  4. Pt indpendent with HEP for maintenance of gains made in PT. Ongoing 8/6/19  5. Improve LEFS by 10 points. Ongoing 8/6/19 48/80  6. Improve TUG By 3-4 seconds indicating more functional gait and decrease likelihood of falls. OUTCOME MEASURE:   Tool Used: Modified Oswestry Low Back Pain Questionnaire  Score:  Initial: 7/50  Most Recent: X/50 (Date: -- )   Interpretation of Score: Each section is scored on a 0-5 scale, 5 representing the greatest disability. The scores of each section are added together for a total score of 50. Tool Used: Timed Up and Go (TUG)  Score:  Initial: 14 seconds 8/6/19 Most Recent: X seconds (Date: -- )   Interpretation of Score: The test measures, in seconds, the time taken by an individual to stand up from a standard arm chair (seat height 46 cm [18 in], arm height 65 cm [25.6 in]), walk a distance of 3 meters (118 in, approx 10 ft), turn, walk back to the chair and sit down. If the individual takes longer than 14 seconds to complete TUG, this indicates risk for falls. Tool Used: Lower Extremity Functional Scale (LEFS)  Score:  Initial: 48/80 8/6/19 Most Recent: X/80 (Date: -- )   Interpretation of Score: 20 questions each scored on a 5 point scale with 0 representing \"extreme difficulty or unable to perform\" and 4 representing \"no difficulty\".   The lower the score, the greater the functional disability. 80/80 represents no disability. Minimal detectable change is 9 points. MEDICAL NECESSITY:   · Patient is expected to demonstrate progress in strength, range of motion and balance to improve safety during ambulation and daily activities. .  REASON FOR SERVICES/OTHER COMMENTS:  · Patient continues to require skilled intervention due to as her decrease strength, pain and decrease ROM is affectin her ability to walk and be mobile. .  Total Duration:  Therapeutic ex 45 minutes       Rehabilitation Potential For Stated Goals: Good  Regarding Hifabio Beltran's therapy, I certify that the treatment plan above will be carried out by a therapist or under their direction. Thank you for this referral,  Rosalind Blanchard PT     Referring Physician Signature: Danii LOZA* _______________________________ Date _____________     PAIN/SUBJECTIVE:   Initial:   1/10 Post Session: 1/10   HISTORY:   History of Injury/Illness (Reason for Referral):  Ms Cristi Magaña has been seen for  30 visits of PT- aquatics and has made good progress. She reports no real pain today but continues to have functional weakness and problems with mobility. Past Medical History/Comorbidities:   Ms. Cristi Magaña  has a past medical history of Arthritis, Back pain, Cataracts, bilateral, Chronic pain, Edema (2/26/2013), GERD (gastroesophageal reflux disease), Gout (2/26/2013), Hyperglycemia (8/31/2015), Hyperlipidemia (2/26/2013), Hypertension, IBS (irritable bowel syndrome), Ill-defined condition, Mixed hyperlipidemia (10/16/2018), Morbid obesity ([address] (03/22/2018), PONV (postoperative nausea and vomiting), Prediabetes (6/22/2016), Sciatica, Ulcer of ankle ([address] (2/26/2013), and Vitamin D deficiency (2/19/2015). She also has no past medical history of Unspecified adverse effect of anesthesia.   Ms. Cristi Magaña  has a past surgical history that includes hx appendectomy (1954); hx tonsillectomy (1955); hx cataract removal (Bilateral); hx cataract removal (Bilateral, 2016); hx lap cholecystectomy (1993); hx hernia repair (1963); hx hernia repair (2555,3565,1972); hx hysterectomy (1986); hx orthopaedic (Right, 1982); pr total knee arthroplasty (Left, 2005); pr total knee arthroplasty (Right, 2012); hx shoulder replacement (Right, 2013); and hx rotator cuff repair (Left, 2011).'  Social History/Living Environment:     lives alone with no stairs. Prior Level of Function/Work/Activity:  Pt was independent but complains of being slow. Ambulatory/Rehab Services H2 Model Falls Risk Assessment   Risk Factors:       (5)  History of Recent Falls [w/in 3 months] Ability to Rise from Chair:       (1)  Pushes up, successful in one attempt   Falls Prevention Plan:       No modifications necessary   Total: (5 or greater = High Risk): 6   ©2010 [facility] of [employer]. All Rights Reserved. [facility] Patent #[id number]. Federal Law prohibits the replication, distribution or use without written permission from [facility] [employer]   Current Medications:       Current Outpatient Medications:     lisinopril (PRINIVIL, ZESTRIL) 10 mg tablet, Take 2 Tabs by mouth two (2) times a day., Disp: 180 Tab, Rfl: 3    omeprazole (PRILOSEC) 20 mg capsule, Take 1 Cap by mouth daily. , Disp: 90 Cap, Rfl: 3    allopurinol (ZYLOPRIM) 300 mg tablet, Take 1 Tab by mouth daily. , Disp: 90 Tab, Rfl: 3    DISABLED PLACARD (DISABLED PLACARD) DMV, Use as needed, Disp: 1 Each, Rfl: 0    clobetasol (TEMOVATE) 0.05 % topical cream, Apply  to affected area two (2) times a day., Disp: 15 g, Rfl: 1    pravastatin (PRAVACHOL) 20 mg tablet, Take 1 Tab by mouth nightly., Disp: 90 Tab, Rfl: 3    lactobacillus rhamnosus gg 10 billion cell (CULTURELLE) 10 billion cell capsule, Take 1 Cap by mouth two (2) times a day., Disp: , Rfl:     acetaminophen (TYLENOL) 325 mg tablet, Take one tab by mouth with breakfast, lunch, dinner, and bedtime, Disp: , Rfl:     ascorbic acid (VITAMIN C) 500 mg tablet, Take one tablet daily to increase iron absorption, Disp: , Rfl:     Cholecalciferol, Vitamin D3, 1,000 unit cap, Take 2,000 Units by mouth daily. Indications: hold for 7 days prior to surgery per anesthesia protocol - instructed 3/22/18, Disp: , Rfl:     fish oil-dha-epa 1,200-144-216 mg cap, Take 1 Tab by mouth daily. Indications: hold for 7 days prior to surgery per anesthesia protocol - instructed 3/22/18, Disp: , Rfl:     GLUCOSAMINE HCL/CHONDR GRANADOS A NA (OSTEO BI-FLEX PO), Take  by mouth daily. Indications: hold for 7 days prior to surgery per anesthesia protocol - instructed 3/22/18, Disp: , Rfl:     aspirin 81 mg tablet, Take 81 mg by mouth daily. Indications: prevention of transient ischemic attack, Disp: , Rfl:     multivitamin (ONE A DAY) tablet, Take 1 Tab by mouth every morning., Disp: , Rfl:    Date Last Reviewed:  8/6/2019   Number of Personal Factors/Comorbidities that affect the Plan of Care: 1-2: MODERATE COMPLEXITY   EXAMINATION:     Observation/Orthostatic Postural Assessment: Pt continues with external rotation of LE with gait. Imani is improved but still slow per TUG. Palpation: not today. ROM: Trunk     Flex- finger tips to shin     Ext - to neutral                       Strength: 8/6/19  L Hip flex- 3/5     HS 4/5     Able to do 1 SLR independently  R Hip flex- 3+/5     HS 4/5     Able to do 3 SLR indpendently                   Functional Mobility:  Sit to/from Stand: use of hands with compensation. Step up/stairs- unable to step up without railing and use of UE.(initial)  Able to ascend 1 step with single railing today. (8/6/19)  Able to lift L LE with greater ease today also. Sit- supine- uses              Date:  8/6/19 Date:   Date:     Activity/Exercise Parameters Parameters Parameters   Heel slide 1 x 10     SLR 2 x 5     SAQ 1 x 10                             Required assist with SLR and heel slide. Will begin these 3 ex as HEP.      Body Structures Involved:  1. Nerves  2. Joints  3. Muscles Body Functions Affected:  1. Neuromusculoskeletal  2. Movement Related Activities and Participation Affected:  1. General Tasks and Demands  2. Mobility  3.  Self Care   Number of elements (examined above) that affect the Plan of Care: 4+: HIGH COMPLEXITY   CLINICAL PRESENTATION:   Presentation: Evolving clinical presentation with unstable and unpredictable characteristics: HIGH COMPLEXITY   CLINICAL DECISION MAKING:   Use of outcome tool(s) and clinical judgement create a POC that gives a: Questionable prediction of patient's progress: MODERATE COMPLEXITY

## 2019-08-13 ENCOUNTER — HOSPITAL ENCOUNTER (OUTPATIENT)
Dept: PHYSICAL THERAPY | Age: 74
Discharge: HOME | End: 2019-08-13
Payer: MEDICARE

## 2019-08-13 PROCEDURE — 97113 AQUATIC THERAPY/EXERCISES: CPT

## 2019-08-13 NOTE — PROGRESS NOTES
Pat Carreon  : 1945  [place] / Product Type: Medicare /  [address] [facility], [address], [place] [address].  [phone]   [phone]       OUTPATIENT PHYSICAL THERAPY: Daily Treatment Note 2019     ICD-10: Treatment Diagnosis: M48.062 spinal stenosis of lumbar region with neurogenic claudication, M43.10- degenerative spondylolisthesis  Precautions/Allergies:   Adhesive; Clindamycin; Dilaudid [hydromorphone]; and Keflex [cephalexin]   MD Orders: Evaluate and treat, modalities, core strengthening and stretching MEDICAL/REFERRING DIAGNOSIS:  Low back pain   DATE OF ONSET:   REFERRING PHYSICIAN: Coco Martinez*  RETURN PHYSICIAN APPOINTMENT: prn       Pre-treatment Symptoms/Complaints:   C/O R sided low back/hip pain today. Pain: Initial:    Post Session:  Not rated    Medications Last Reviewed:  2019    Updated Objective Findings:   None Today     TREATMENT:     Therapeutic Exercise: ( ):  45 minutes Exercises per grid below to improve mobility, strength and balance. Required moderate visual, verbal and manual cues to promote proper body alignment, promote proper body posture and promote proper body mechanics. Progressed resistance, range and repetitions as indicated. Date:  19 Date:   Date:     Activity/Exercise Parameters Parameters Parameters   Seated march 10     SLR 10     Heel slide 10     Hip stretching AA- rotators , adduction 5 x 20 sec      SKTC stretching AA 3 x 30     Standing march 10     Standing mini squat 10     Seated ankle dorsi/plant flex 5 x 20     bridging 2 x 10     Adduction in hook lying with ball 2 x 10     Standing trunk ext 1 x 10                 [employer] Portal    Treatment/Session Summary:    · Response to Treatment:   Very tight hip musculature, ankle musculature. Still very weak in LE's  Hip pain abolished with trunk ext. · Communication/Consultation:  None today  · Equipment provided today:  None today  · Recommendations/Intent for next treatment session: Next visit will focus on progressing exercises and gait. Effective Dates: 8/6/2019 TO 11/6/2019 (90 days).   Frequency/Duration: 2 times a week for 90 Day(s)    Visit Count:  5  Total Treatment Billable Duration:  45 minutes  PT Patient Time In/Time Out  Time In: 0945  Time Out: [address], PT    Future Appointments   Date Time Provider Tai [place]   8/15/2019 10:45 AM Drena Charity, PTA [facility]   8/20/2019 10:45 AM Drena Charity, PTA [facility]   8/22/2019 10:45 AM Drena Charity, PTA [facility]   8/27/2019 10:30 AM [facility] Ware, [facility]   8/29/2019 11:00 AM Dreteri Charity, PTA [facility]   9/3/2019 10:30 AM Anahi Ware, PT [facility]   9/5/2019 10:45 AM Drena Charity, PTA [facility]   9/10/2019 10:30 AM Anahi Chaz, PT [facility]   9/12/2019 10:45 AM Dreteri Charity, PTA [facility]   9/17/2019 10:30 AM Anahi Ware, PT [facility]   9/19/2019 10:45 AM Dreteri Nash, PTA [facility]   10/22/2019  9:30 AM [facility] LAB [facility]   10/29/2019  9:45 AM Sree Mario MD [facility]   11/6/2019 12:30 PM [facility] PHONE APPOINTMENT CULLEN PHILLIPS OR PRE A

## 2019-08-15 ENCOUNTER — HOSPITAL ENCOUNTER (OUTPATIENT)
Dept: PHYSICAL THERAPY | Age: 74
Discharge: HOME | End: 2019-08-15
Payer: MEDICARE

## 2019-08-15 PROCEDURE — 97113 AQUATIC THERAPY/EXERCISES: CPT

## 2019-08-15 NOTE — PROGRESS NOTES
Sandy Hutson  : 1945  [place] / Product Type: Medicare /  [address]  at [facility] RICHARD MITCHELL  1101 [facility], [address] [place] [address],[address], [facility] [address].  [phone]   [phone]       OUTPATIENT PHYSICAL THERAPY: Daily Treatment Note 8/15/2019     ICD-10: Treatment Diagnosis: M48.062 spinal stenosis of lumbar region with neurogenic claudication, M43.10- degenerative spondylolisthesis  Precautions/Allergies:   Adhesive; Clindamycin; Dilaudid [hydromorphone]; and Keflex [cephalexin]   MD Orders: Evaluate and treat, modalities, core strengthening and stretching MEDICAL/REFERRING DIAGNOSIS:  Low back pain   DATE OF ONSET:   REFERRING PHYSICIAN: Yoly Dalton*  RETURN PHYSICIAN APPOINTMENT: prn       Pre-treatment Symptoms/Complaints:   Pt states she feels tired today but thinks it is from the weather. Pain: Initial:   not rated Post Session:  Not rated    Medications Last Reviewed:  8/15/2019    Updated Objective Findings:   None Today     TREATMENT:     Aquatic Therapy (45 minutes): Aquatic treatment performed per flow grid for Decreased muscle strength, Decreased endurance, Decreased range of motion, Ease of movement and Low impact and reduced weight bearing activity. Cues provided for posture and balance. Aquatic Exercise Log       Date  19 Date  19 Date  19 Date  8/15/19   Activity/ Exercise       Walking forward 8 8 8 8   Walking backward 8 8 8 8   Walking sideways 8 8 8 8     Marching 8 8 8 8     Goose Step 8 8 8 8     Tip toes 3 3 3 3     Heels 3 3 3 3     Lunges Long step 8 Long step 8 Long step 8 Long step 8   Side step squats       LE Exercises 3. 5' holding rail with big noodle 3. 5' holding rail with big noodle 3. 5' holding rail with big noodle  3. 5' holding rail with big noodle      Hip Flex/Ext 20 B 20 B 20 B 20 B     Hip Abd/Add 20 B 20 B 20 B 20 B     Hip IR/ER         Calf raises         Knee Flex 20 with over stretch x 3 20 with over stretch x3 20 with over stretch x3  20 with over stretch x3     Squats         Leg Circles 20/20 B no noodle 20/20 B no noodle 20/20 B no noodle 20/20 B no noodle     Step Ups 20 small step in 4.5' 20 small step in 4.5' 20 small step in 4.5'  20 small step    UE Exercises Purple noodle Purple noodle       Squeeze In 10 10       Push Down 10 10       Pull Down 10 10       Bicep/Tricep 10 10     Rows/Press outs 10/10 10/10      Chi Positions         Trunk Rotation       Deep H2O/ Noodles 7' with noodle and assist 7' with noodle and assist  7' with noodle and assist  7' with noodle and assist      Stabilization         Arms only         Legs only 3 min 3 min 3 min 3 min   [address] 3 min 3 min 3 min 3 min     Scissors 3 min 3 min 3 min 3 min     Crab walk       Lower abdominal   work  DKTC 3 min DKTC 3 min  DKTC 3 min DKTC 3 min     Cardio         Jogging       Lap   Swimming         Stretches         Hamstrings         Heelcords         Hip flex/ rotators         Neck         [facility] Portal    Treatment/Session Summary:    · Response to Treatment:   Pt continues to do well in aquatic therapy. · Communication/Consultation:  None today  · Equipment provided today:  None today  · Recommendations/Intent for next treatment session: Next visit will focus on progressing exercises and gait. Effective Dates: 8/6/2019 TO 11/6/2019 (90 days).   Frequency/Duration: 2 times a week for 90 Day(s)   Visit Count:  5  Total Treatment Billable Duration:  45 minutes  PT Patient Time In/Time Out  Time In: 2053  Time Out: [address] [place], PTA    Future Appointments   Date Time Provider Tai Natalia   8/20/2019 10:45 AM Sav López PTA [facility]   8/22/2019 10:45 AM Sav López PTA [facility]   8/27/2019 10:30 AM BRIDGET Hurtado [facility]   8/29/2019 11:00 AM MAKSIM Gatica [facility]   9/3/2019 10:30 AM Dominik Zaida, PT [facility]   9/5/2019 10:45 AM Sav Rene, PTA [facility]   9/10/2019 10:30 AM Nazario Sánchez, PT [facility]   9/12/2019 10:45 AM Sav Rene, PTA [facility]   9/17/2019 10:30 AM Nazario Sánchez, PT [facility]   9/19/2019 10:45 AM Sav López, PTA [facility]   10/22/2019  9:30 AM [facility] LAB [facility]   10/29/2019  9:45 AM Haley Cruz MD [place] [facility]   11/6/2019 12:30 PM JACQUELINE PHONE APPOINTMENT CULLEN PHILLIPS OR PRE A

## 2019-08-20 ENCOUNTER — HOSPITAL ENCOUNTER (OUTPATIENT)
Dept: PHYSICAL THERAPY | Age: 74
Discharge: HOME | End: 2019-08-20
Payer: MEDICARE

## 2019-08-20 PROCEDURE — 97113 AQUATIC THERAPY/EXERCISES: CPT

## 2019-08-20 NOTE — PROGRESS NOTES
Agnes Tovar  : 1945  [place] / Product Type: Medicare /  [address] at [facility] RICHARD MITCHELL  [address] [place] [address], [address], [place] [address].  [phone]   [phone]       OUTPATIENT PHYSICAL THERAPY: Daily Treatment Note 2019     ICD-10: Treatment Diagnosis: M48.062 spinal stenosis of lumbar region with neurogenic claudication, M43.10- degenerative spondylolisthesis  Precautions/Allergies:   Adhesive; Clindamycin; Dilaudid [hydromorphone]; and Keflex [cephalexin]   MD Orders: Evaluate and treat, modalities, core strengthening and stretching MEDICAL/REFERRING DIAGNOSIS:  Low back pain   DATE OF ONSET:   REFERRING PHYSICIAN: Brennan Duenas  RETURN PHYSICIAN APPOINTMENT: prn       Pre-treatment Symptoms/Complaints:   Pt is fatigued after therapy. Pain: Initial:   not rated Post Session:  Not rated    Medications Last Reviewed:  2019    Updated Objective Findings:   None Today     TREATMENT:     Aquatic Therapy (45 minutes): Aquatic treatment performed per flow grid for Decreased muscle strength, Decreased endurance, Decreased range of motion, Ease of movement and Low impact and reduced weight bearing activity. Cues provided for posture and balance. Aquatic Exercise Log       Date  19   Activity/ Exercise    Walking forward 8   Walking backward 8   Walking sideways 8     Marching 8     Goose Step 8     Tip toes 3     Heels 3     Lunges Long step 8   Side step squats    LE Exercises 3. 5' holding rail with big noodle      Hip Flex/Ext 20 B     Hip Abd/Add 20 B     Hip IR/ER      Calf raises      Knee Flex 20 with over stretch x3     Squats      Leg Circles 20/20 B no noodle     Step Ups 20 small step    UE Exercises      Squeeze In      Push Down      Pull Down      Bicep/Tricep    Rows/Press outs     Chi Positions      Trunk Rotation    Deep H2O/ Noodles 7' with noodle and assist Stabilization      Arms only      Legs only 3 min   [place] 3 min     Scissors 3 min     Crab walk    Lower abdominal   work  DKTC 3 min     Cardio      Jogging    Lap   Swimming      Stretches      Hamstrings      Heelcords      Hip flex/ rotators      Neck      [facility]    Treatment/Session Summary:    · Response to Treatment:   Pt continues to do well in aquatic therapy. She will have 2 pool visits this week. · Communication/Consultation:  None today  · Equipment provided today:  None today  · Recommendations/Intent for next treatment session: Next visit will focus on progressing exercises and gait. Effective Dates: 8/6/2019 TO 11/6/2019 (90 days).   Frequency/Duration: 2 times a week for 90 Day(s)   Visit Count:  5  Total Treatment Billable Duration:  45 minutes  PT Patient Time In/Time Out  Time In: 8522  Time Out: [address] [place], PTA    Future Appointments   Date Time Provider Tai [facility]   8/22/2019 10:45 AM Michelle Days, PTA [facility]   8/27/2019 10:30 AM [facility] Duke[facility]   8/29/2019 11:00 AM Michelle Days, PTA [facility]   9/3/2019 10:30 AM Savanna Duke, PT [facility]   9/5/2019 10:45 AM Michelle Days, PTA [facility]   9/10/2019 10:30 AM Savanna Duke, PT [facility]   9/12/2019 10:45 AM Michelle Days, PTA [facility]   9/17/2019 10:30 AM Savanna Duke, PT [facility]   9/19/2019 10:45 AM Michelle Days, PTA [facility]   10/22/2019  9:30 AM [facility] LAB [facility]   10/29/2019  9:45 AM Janet Albarado MD [place] [facility]   11/6/2019 12:30 PM SFD PHONE APPOINTMENT CULLEN PHILLIPS OR PRE A

## 2019-08-22 ENCOUNTER — HOSPITAL ENCOUNTER (OUTPATIENT)
Dept: PHYSICAL THERAPY | Age: 74
Discharge: HOME | End: 2019-08-22
Payer: MEDICARE

## 2019-08-22 PROCEDURE — 97113 AQUATIC THERAPY/EXERCISES: CPT

## 2019-08-22 NOTE — PROGRESS NOTES
Keny Kirit  : 1945  [place] / Product Type: Medicare /  [address] at [facility] RICHARD MITCHELL  [address] [place] [address], [address], [place] [address].  [phone]   [phone]       OUTPATIENT PHYSICAL THERAPY: Daily Treatment Note 2019     ICD-10: Treatment Diagnosis: M48.062 spinal stenosis of lumbar region with neurogenic claudication, M43.10- degenerative spondylolisthesis  Precautions/Allergies:   Adhesive; Clindamycin; Dilaudid [hydromorphone]; and Keflex [cephalexin]   MD Orders: Evaluate and treat, modalities, core strengthening and stretching MEDICAL/REFERRING DIAGNOSIS:  Low back pain   DATE OF ONSET:   REFERRING PHYSICIAN: Jayshree Hernández  RETURN PHYSICIAN APPOINTMENT: prn       Pre-treatment Symptoms/Complaints:   Pt with no new complaints today. Pain: Initial:   not rated Post Session:  Not rated    Medications Last Reviewed:  2019    Updated Objective Findings:   None Today     TREATMENT:     Aquatic Therapy (45 minutes): Aquatic treatment performed per flow grid for Decreased muscle strength, Decreased endurance, Decreased range of motion, Ease of movement and Low impact and reduced weight bearing activity. Cues provided for posture and balance. Aquatic Exercise Log       Date  19 Date  19   Activity/ Exercise     Walking forward 8 8   Walking backward 8 8   Walking sideways 8 8     Marching 8 8     Goose Step 8 8     Tip toes 3 3     Heels 3 3     Lunges Long step 8 Long step 8   Side step squats     LE Exercises 3. 5' holding rail with big noodle  3. 5' holding rail with big noodle     Hip Flex/Ext 20 B 20 B     Hip Abd/Add 20 B 20 B     Hip IR/ER       Calf raises       Knee Flex 20 with over stretch x3 20 with over stretch x3     Squats       Leg Circles 20/20 B no noodle 20/20 no noodle     Step Ups 20 small step  20 B small step    UE Exercises  3. 5' with purple noodle Squeeze In  10     Push Down  10     Pull Down  10     Bicep/Tricep     Rows/Press outs  10/10    Chi Positions       Trunk Rotation     Deep H2O/ Noodles 7' with noodle and assist  7' with noodle and assist      Stabilization       Arms only       Legs only 3 min 3 min   Askelund 93 3 min 3 min     Scissors 3 min 3 min     Crab walk     Lower abdominal   work  DKTC 3 min DKTC 3 min     [employer] Portal    Treatment/Session Summary:    · Response to Treatment:   Pt continues to do well with mobility and exercises. · Communication/Consultation:  None today  · Equipment provided today:  None today  · Recommendations/Intent for next treatment session: Next visit will focus on progressing exercises and gait. Effective Dates: 8/6/2019 TO 11/6/2019 (90 days).   Frequency/Duration: 2 times a week for 90 Day(s)   Visit Count:  5  Total Treatment Billable Duration:  45 minutes  PT Patient Time In/Time Out  Time In: 9335  Time Out: [address] [place], PTA    Future Appointments   Date Time Provider Tai Fisher   8/27/2019 10:30 AM Carmen Caraballo, PT [facility]   8/29/2019 11:00 AM Alverna Opoka, PTA [facility]   9/3/2019 10:30 AM Carmen Caraballo, PT [facility]   9/5/2019 10:45 AM Alverna [place]oka, PTA [facility]   9/10/2019 10:30 AM Altjason Caraballo, PT [facility]   9/12/2019 10:45 AM Alverna [place], PTA [facility]   9/17/2019 10:30 AM Altjason Caraballo, PT [facility]   9/19/2019 10:45 AM Alverna Opoka, PTA [facility]   10/22/2019  9:30 AM [facility]   10/29/2019  9:45 AM Odalys Carmona MD [place] [facility]   11/6/2019 12:30 PM SFD PHONE APPOINTMENT CULLEN PHILLIPS OR PRE A

## 2019-08-27 ENCOUNTER — HOSPITAL ENCOUNTER (OUTPATIENT)
Dept: PHYSICAL THERAPY | Age: 74
Discharge: HOME | End: 2019-08-27
Payer: MEDICARE

## 2019-08-27 PROCEDURE — 97110 THERAPEUTIC EXERCISES: CPT

## 2019-08-27 NOTE — PROGRESS NOTES
April Bustos  : 1945  [place] / Product Type: Medicare /  [address] at [facility] RICHARD MITCHELL  [address] [place] [address], [address], [place] [address].  [phone]   [phone]       OUTPATIENT PHYSICAL THERAPY: Daily Treatment Note 2019     ICD-10: Treatment Diagnosis: M48.062 spinal stenosis of lumbar region with neurogenic claudication, M43.10- degenerative spondylolisthesis  Precautions/Allergies:   Adhesive; Clindamycin; Dilaudid [hydromorphone]; and Keflex [cephalexin]   MD Orders: Evaluate and treat, modalities, core strengthening and stretching MEDICAL/REFERRING DIAGNOSIS:  Low back pain   DATE OF ONSET:   REFERRING PHYSICIAN: Elie Sebastian*  RETURN PHYSICIAN APPOINTMENT: prn       Pre-treatment Symptoms/Complaints:   States she is getting stronger but complains of how slow it is. Pain: Initial:    Post Session:  Not rated    Medications Last Reviewed:  2019    Updated Objective Findings:   None Today     TREATMENT:     Therapeutic Exercise: ( ):  45 minutes Exercises per grid below to improve mobility, strength and balance. Required moderate visual, verbal and manual cues to promote proper body alignment, promote proper body posture and promote proper body mechanics. Progressed resistance, range and repetitions as indicated.      Date:  19 Date:  19 Date:     Activity/Exercise Parameters Parameters Parameters   Seated march 10 10    SLR 10     Heel slide 10     Hip stretching AA- rotators , adduction 5 x 20 sec      SKTC stretching AA 3 x 30     Standing march 10 2 x 10    Standing mini squat 10     Seated ankle dorsi/plant flex 5 x 20     bridging 2 x 10     Adduction in hook lying with ball 2 x 10     Standing trunk ext 1 x 10 1 x 10    Standing on foam  3 x 45 sec    Nautilus knee ext  10# 1 x 10    Calf stretch  3 x 20 sec    Seated abduction  1 x 10 blue band    Step ups  4' 2 x 10 B [facility] Portal    Treatment/Session Summary:    · Response to Treatment:   Complains of pain in her groin area on the L with several of the seated ex. She had a hernia repair which may contribute to this. Will continue to monitor. Strength is improved but still very weak. · Communication/Consultation:  None today  · Equipment provided today:  None today  · Recommendations/Intent for next treatment session: Next visit will focus on progressing exercises and gait. Effective Dates: 8/6/2019 TO 11/6/2019 (90 days).   Frequency/Duration: 2 times a week for 90 Day(s)    Visit Count:  5  Total Treatment Billable Duration:  45 minutes  PT Patient Time In/Time Out  Time In: 0137  Time Out: [address], PT    Future Appointments   Date Time Provider Tai Fisher   8/29/2019 11:00 AM Kathyleen Prom, PTA [facility]   9/3/2019 10:30 AM Brianna Pummel, PT [facility]   9/5/2019 10:45 AM Kathyleen Prom, PTA [facility]   9/10/2019 10:30 AM [facility]mel[facility]   9/12/2019 10:45 AM Kathyleen Prom, P[facility]   9/17/2019 10:30 AM Brianna Pummel, PT [facility]   9/19/2019 10:45 AM Kathyleen Prom, PTA [facility]   10/22/2019  9:30 AM [facility]   10/29/2019  9:45 AM Apolinar Diaz MD [place] [facility]   11/6/2019 12:30 PM SFD PHONE APPOINTMENT CULLEN PHILLIPS OR PRE A

## 2019-08-29 ENCOUNTER — HOSPITAL ENCOUNTER (OUTPATIENT)
Dept: PHYSICAL THERAPY | Age: 74
Discharge: HOME | End: 2019-08-29
Payer: MEDICARE

## 2019-08-29 PROCEDURE — 97113 AQUATIC THERAPY/EXERCISES: CPT

## 2019-08-29 NOTE — PROGRESS NOTES
Annetta Schofield  : 1945  [place] / Product Type: Medicare /  [address] at [facility] RICHARD MITCHELL  [address] [facility], [address] [place] [address],[address], [address] [facility]  [phone]   [phone]       OUTPATIENT PHYSICAL THERAPY: Daily Treatment Note 2019     ICD-10: Treatment Diagnosis: M48.062 spinal stenosis of lumbar region with neurogenic claudication, M43.10- degenerative spondylolisthesis  Precautions/Allergies:   Adhesive; Clindamycin; Dilaudid [hydromorphone]; and Keflex [cephalexin]   MD Orders: Evaluate and treat, modalities, core strengthening and stretching MEDICAL/REFERRING DIAGNOSIS:  Low back pain   DATE OF ONSET:   REFERRING PHYSICIAN: Erin Falcon*  RETURN PHYSICIAN APPOINTMENT: prn       Pre-treatment Symptoms/Complaints:   Pt states the back of her left knee is bothering her today       Pain: Initial:   not rated Post Session:  Not rated    Medications Last Reviewed:  2019    Updated Objective Findings:   None Today     TREATMENT:     Aquatic Therapy (45 minutes): Aquatic treatment performed per flow grid for Decreased muscle strength, Decreased endurance, Decreased range of motion, Ease of movement and Low impact and reduced weight bearing activity. Cues provided for posture and balance. Aquatic Exercise Log       Date  19 Date  19 Date  19   Activity/ Exercise      Walking forward 8 8 8   Walking backward 8 8 8   Walking sideways 8 8 8     Marching 8 8 8     Goose Step 8 8 8     Tip toes 3 3 3     Heels 3 3 3     Lunges Long step 8 Long step 8 Long step 8   Side step squats      LE Exercises 3. 5' holding rail with big noodle  3. 5' holding rail with big noodle 3. 5' holding rail with big noodle     Hip Flex/Ext 20 B 20 B 20 B     Hip Abd/Add 20 B 20 B 20 B     Hip IR/ER        Calf raises        Knee Flex 20 with over stretch x3 20 with over stretch x3 20 with over stretch x3     Squats Leg Circles 20/20 B no noodle 20/20 no noodle 20/20 no noodle      Step Ups 20 small step  20 B small step  20 B small step    UE Exercises  3. 5' with purple noodle 3. 5' with purple noodle      Squeeze In  10 10     Push Down  10 10     Pull Down  10 10     Bicep/Tricep      Rows/Press outs  10/10 10/10    Chi Positions        Trunk Rotation      Deep H2O/ Noodles 7' with noodle and assist  7' with noodle and assist  7' with noodle and assist      Stabilization        Arms only        Legs only 3 min 3 min 3 min   Askelund 93 3 min 3 min 3 min     Scissors 3 min 3 min 3 min     Crab walk      Lower abdominal   work  DKTC 3 min DKTC 3 min DKTC 3 min     [employer] Portal    Treatment/Session Summary:    · Response to Treatment:   Pt continues to do well with land and aquatic therapies. · Communication/Consultation:  None today  · Equipment provided today:  None today  · Recommendations/Intent for next treatment session: Next visit will focus on progressing exercises and gait. Effective Dates: 8/6/2019 TO 11/6/2019 (90 days).   Frequency/Duration: 2 times a week for 90 Day(s)   Visit Count:  5  Total Treatment Billable Duration:  45 minutes  PT Patient Time In/Time Out  Time In: 6845  Time Out: [address] [place], PTA    Future Appointments   Date Time Provider Tai Fisher   9/3/2019 10:30 AM Brianna Augustine, PT [facility]   9/5/2019 10:45 AM Jose Prom, [facility]   9/10/2019 10:30 AM Bri[facility] Augustine, [facility]   9/12/2019 10:45 AM Katpierceleemely Prom, PTA [facility]   9/17/2019 10:30 AM Brianna Augustine, PT [facility]   9/19/2019 10:45 AM Katjennifer Prom, PTA [facility]   10/22/2019  9:30 AM [facility]   10/29/2019  9:45 AM Apolinar Diaz MD [place] [facility]   11/6/2019 12:30 PM SFD PHONE APPOINTMENT [facility] OR PRE A

## 2019-09-03 ENCOUNTER — HOSPITAL ENCOUNTER (OUTPATIENT)
Dept: PHYSICAL THERAPY | Age: 74
Discharge: HOME | End: 2019-09-03
Payer: MEDICARE

## 2019-09-03 PROCEDURE — 97110 THERAPEUTIC EXERCISES: CPT

## 2019-09-05 ENCOUNTER — HOSPITAL ENCOUNTER (OUTPATIENT)
Dept: PHYSICAL THERAPY | Age: 74
Discharge: HOME | End: 2019-09-05
Payer: MEDICARE

## 2019-09-10 ENCOUNTER — HOSPITAL ENCOUNTER (OUTPATIENT)
Dept: PHYSICAL THERAPY | Age: 74
Discharge: HOME | End: 2019-09-10
Payer: MEDICARE

## 2019-09-10 PROCEDURE — 97110 THERAPEUTIC EXERCISES: CPT

## 2019-09-10 NOTE — PROGRESS NOTES
Yao Ramires  : 1945  [place] / Product Type: Medicare /  [address] at [facility] RICHARD MITCHELL  [address] [place] [address], [address], [place] [address].  [phone]   [phone]       OUTPATIENT PHYSICAL THERAPY: Daily Treatment Note 9/10/2019     ICD-10: Treatment Diagnosis: M48.062 spinal stenosis of lumbar region with neurogenic claudication, M43.10- degenerative spondylolisthesis  Precautions/Allergies:   Adhesive; Clindamycin; Dilaudid [hydromorphone]; and Keflex [cephalexin]   MD Orders: Evaluate and treat, modalities, core strengthening and stretching MEDICAL/REFERRING DIAGNOSIS:  Low back pain   DATE OF ONSET:   REFERRING PHYSICIAN: Abhinav Tejeda  RETURN PHYSICIAN APPOINTMENT: prn       Pre-treatment Symptoms/Complaints:   Pt states her muscles were very sore after last land session. Pain: Initial:    Post Session:  Not rated    Medications Last Reviewed:  9/10/2019    Updated Objective Findings:   None Today     TREATMENT:     Therapeutic Exercise: ( ):  45 minutes Exercises per grid below to improve mobility, strength and balance. Required moderate visual, verbal and manual cues to promote proper body alignment, promote proper body posture and promote proper body mechanics. Progressed resistance, range and repetitions as indicated.      Date:  19 Date:  19 Date:  9/3/19 Date  9/10/19   Activity/Exercise Parameters Parameters Parameters    Seated march 10 10  10   SLR 10  2 x 10 2 x 10   Heel slide 10  With lift 3 x10 3 x 10 with lift   Hip stretching AA- rotators , adduction 5 x 20 sec       SKTC stretching AA 3 x 30      Standing march 10 2 x 10 2 x 10 2 x 10   Standing mini squat 10  Sit to stand 2 x 10 Sit to stand 2 x 10   Seated ankle dorsi/plant flex 5 x 20      bridging 2 x 10  2 x 10 2 x 10   Adduction in hook lying with ball 2 x 10  2 x 10    Standing trunk ext 1 x 10 1 x 10     Standing on foam 3 x 45 sec Step ups onto foam 2 x 10    Nautilus knee ext  10# 1 x 10  10# 2 x 10   Calf stretch  3 x 20 sec     Seated abduction  1 x 10 blue band  1 x 10 with blue band   Step ups  4' 2 x 10 B     Tandem stance (modified)   4 x 30 sec    Weight shifts in standing   Lateral with lift 2 x 10                        Med[employer] Portal    Treatment/Session Summary:    · Response to Treatment:   Strength in LE improving slowly. · Communication/Consultation:  None today  · Equipment provided today:  None today  · Recommendations/Intent for next treatment session: Next visit will focus on progressing exercises and gait. Effective Dates: 8/6/2019 TO 11/6/2019 (90 days).   Frequency/Duration: 2 times a week for 90 Day(s)    Visit Count:  5  Total Treatment Billable Duration:  45 minutes  PT Patient Time In/Time Out  Time In: 1030  Time Out: [address] , PT    Future Appointments   Date Time Provider Tai Fisher   9/10/2019 10:30 AM Vu Horvath PT [facility]   9/12/2019 10:45 AM Rick Smith PTA [facility]   9/17/2019 10:30 AM Vu Horvath PT [facility]   9/19/2019 10:45 AM Rick Smith PTA [facility]   10/22/2019  9:30 AM [facility]   10/29/2019  9:45 AM Марина Fuentes MD [place] [facility]   11/6/2019 12:30 PM SFD PHONE APPOINTMENT CULLEN PHILLIPS OR PRE A

## 2019-09-12 ENCOUNTER — HOSPITAL ENCOUNTER (OUTPATIENT)
Dept: PHYSICAL THERAPY | Age: 74
Discharge: HOME | End: 2019-09-12
Payer: MEDICARE

## 2019-09-12 PROCEDURE — 97113 AQUATIC THERAPY/EXERCISES: CPT

## 2019-09-12 NOTE — PROGRESS NOTES
Lida Roger  : 1945  [place] / Product Type: Medicare /  [address] at [facility] RICHARD MITCHELL  [address] [facility], [address] [place] [address],[address], [address] [facility]  [phone]   [phone]       OUTPATIENT PHYSICAL THERAPY: Daily Treatment Note 2019     ICD-10: Treatment Diagnosis: M48.062 spinal stenosis of lumbar region with neurogenic claudication, M43.10- degenerative spondylolisthesis  Precautions/Allergies:   Adhesive; Clindamycin; Dilaudid [hydromorphone]; and Keflex [cephalexin]   MD Orders: Evaluate and treat, modalities, core strengthening and stretching MEDICAL/REFERRING DIAGNOSIS:  Low back pain   DATE OF ONSET:   REFERRING PHYSICIAN: Isac Dunn*  RETURN PHYSICIAN APPOINTMENT: prn       Pre-treatment Symptoms/Complaints:   Pt states she is having work done on her bathroom at home. She is a little frazzled today. Pain: Initial:   not rated Post Session:  Not rated    Medications Last Reviewed:  2019    Updated Objective Findings:   None Today     TREATMENT:     Aquatic Therapy (45 minutes): Aquatic treatment performed per flow grid for Decreased muscle strength, Decreased endurance, Decreased range of motion, Ease of movement and Low impact and reduced weight bearing activity. Cues provided for posture and balance. Aquatic Exercise Log       Date  19 Date  19 Date  19 Date  19   Activity/ Exercise       Walking forward 8 8 8 8   Walking backward 8 8 8 8   Walking sideways 8 8 8 8     Marching 8 8 8 8     Goose Step 8 8 8 8     Tip toes 3 3 3 3     Heels 3 3 3 3     Lunges Long step 8 Long step 8 Long step 8 Long step 8   Side step squats       LE Exercises 3. 5' holding rail with big noodle  3. 5' holding rail with big noodle 3. 5' holding rail with big noodle 3. 5' holding rail with big noodle     Hip Flex/Ext 20 B 20 B 20 B 20 B     Hip Abd/Add 20 B 20 B 20 B 20 B     Hip IR/ER         Calf raises         Knee Flex 20 with over stretch x3 20 with over stretch x3 20 with over stretch x3 20 with over stretch x3     Squats         Leg Circles 20/20 B no noodle 20/20 no noodle 20/20 no noodle  20/20 no noodle      Step Ups 20 small step  20 B small step  20 B small step  20 B small step    UE Exercises  3. 5' with purple noodle 3. 5' with purple noodle  3. 5' with purple noodle     Squeeze In  10 10 10     Push Down  10 10 10     Pull Down  10 10 10     Bicep/Tricep       Rows/Press outs  10/10 10/10 10/10    Chi Positions         Trunk Rotation       Deep H2O/ Noodles 7' with noodle and assist  7' with noodle and assist  7' with noodle and assist  7' with noodle and assist      Stabilization         Arms only         Legs only 3 min 3 min 3 min 3 min   Askelund 93 3 min 3 min 3 min 3 min     Scissors 3 min 3 min 3 min 3 min     Crab walk       Lower abdominal   work  DKTC 3 min DKTC 3 min DKTC 3 min DKTC 3 min     [facility] Portal    Treatment/Session Summary:    · Response to Treatment:   Pt continues to do well with mobility and exercises. · Communication/Consultation:  None today  · Equipment provided today:  None today  · Recommendations/Intent for next treatment session: Next visit will focus on progressing exercises and gait. Effective Dates: 8/6/2019 TO 11/6/2019 (90 days).   Frequency/Duration: 2 times a week for 90 Day(s)   Visit Count:  5  Total Treatment Billable Duration:  45 minutes  PT Patient Time In/Time Out  Time In: 6607  Time Out: [address] [facility] Heidi Garcia PTA    Future Appointments   Date Time Provider Tai Fisher   9/17/2019 10:30 AM BRIDGET Hurtado [facility]   9/19/2019 10:45 AM MAKSIM Gatica [facility]   10/22/2019  9:30 AM [facility] LAB [facility]   10/29/2019  9:45 AM Haley Cruz MD [place] [facility]   11/6/2019 12:30 PM SFD PHONE APPOINTMENT CULLEN PHILLIPS OR PRE A

## 2019-09-17 ENCOUNTER — HOSPITAL ENCOUNTER (OUTPATIENT)
Dept: PHYSICAL THERAPY | Age: 74
Discharge: HOME | End: 2019-09-17
Payer: MEDICARE

## 2019-09-17 PROCEDURE — 97110 THERAPEUTIC EXERCISES: CPT

## 2019-09-17 NOTE — PROGRESS NOTES
Dave Leary  : 1945  [place] / Product Type: Medicare /  [address] at [facility] RICHARD MITCHELL  [address] [facility], [address] [place] [address],[address], [facility] [address]  [phone]   [phone]       OUTPATIENT PHYSICAL THERAPY: Daily Treatment Note 2019     ICD-10: Treatment Diagnosis: M48.062 spinal stenosis of lumbar region with neurogenic claudication, M43.10- degenerative spondylolisthesis  Precautions/Allergies:   Adhesive; Clindamycin; Dilaudid [hydromorphone]; and Keflex [cephalexin]   MD Orders: Evaluate and treat, modalities, core strengthening and stretching MEDICAL/REFERRING DIAGNOSIS:  Low back pain   DATE OF ONSET:   REFERRING PHYSICIAN: Antionette Thomas*  RETURN PHYSICIAN APPOINTMENT: prn       Pre-treatment Symptoms/Complaints:   Pt states her thighs were not as sore this time. She continues to note the weakness in her legs and difficulty doing things. Pain: Initial:    Not rated Post Session:  Not rated    Medications Last Reviewed:  2019    Updated Objective Findings:   None Today     TREATMENT:     Therapeutic Exercise: ( ):  45 minutes Exercises per grid below to improve mobility, strength and balance. Required moderate visual, verbal and manual cues to promote proper body alignment, promote proper body posture and promote proper body mechanics. Progressed resistance, range and repetitions as indicated.      Date:  19 Date:  19 Date:  9/3/19 Date  9/10/19 Date  19   Activity/Exercise Parameters Parameters Parameters     Seated march 10 10  10    SLR 10  2 x 10 2 x 10 3 x 10   Heel slide 10  With lift 3 x10 3 x 10 with lift 3 x 10   Hip stretching AA- rotators , adduction 5 x 20 sec        SKTC stretching AA 3 x 30       Standing march 10 2 x 10 2 x 10 2 x 10 3 x 10   Standing mini squat 10  Sit to stand 2 x 10 Sit to stand 2 x 10 Sit to stand 3 x 10   Seated ankle dorsi/plant flex 5 x 20 bridging 2 x 10  2 x 10 2 x 10 3 x 10   Adduction in hook lying with ball 2 x 10  2 x 10     Standing trunk ext 1 x 10 1 x 10   1 x 10   Standing on foam  3 x 45 sec Step ups onto foam 2 x 10     Nautilus knee ext  10# 1 x 10  10# 2 x 10 10# 1 x 10  15# 2 x 10   Calf stretch  3 x 20 sec      Seated abduction  1 x 10 blue band  1 x 10 with blue band    Step ups  4' 2 x 10 B      Tandem stance (modified)   4 x 30 sec     Weight shifts in standing   Lateral with lift 2 x 10                           [employer] Portal    Treatment/Session Summary:    · Response to Treatment:   Works hard on ex in clinic. Slow improvement in strength in LE. · Communication/Consultation:  None today  · Equipment provided today:  None today  · Recommendations/Intent for next treatment session: Next visit will focus on progressing exercises and gait. Effective Dates: 8/6/2019 TO 11/6/2019 (90 days).   Frequency/Duration: 2 times a week for 90 Day(s)    Visit Count:  5  Total Treatment Billable Duration:  45 minutes  PT Patient Time In/Time Out  Time In: 5449  Time Out: [address] Gomez, PT    Future Appointments   Date Time Provider Tai Fisher   9/19/2019 10:45 AM Sav López PTA [facility]   10/22/2019  9:30 AM [facility]   10/29/2019  9:45 AM Haley Cruz MD [place] [facility]   11/6/2019 12:30 PM SFD PHONE APPOINTMENT CULLEN PHILLIPS OR PRE A

## 2019-09-19 ENCOUNTER — HOSPITAL ENCOUNTER (OUTPATIENT)
Dept: PHYSICAL THERAPY | Age: 74
Discharge: HOME | End: 2019-09-19
Payer: MEDICARE

## 2019-09-19 PROCEDURE — 97113 AQUATIC THERAPY/EXERCISES: CPT

## 2019-09-19 NOTE — PROGRESS NOTES
Ethel Fletcher  : 1945  [place] / Product Type: Medicare /  [address] at [facility] RICHARD MITCHELL  1101 [facility], [address] [place] [address],[address], [facility] 91.  [phone]   [phone]       OUTPATIENT PHYSICAL THERAPY: Daily Treatment Note 2019     ICD-10: Treatment Diagnosis: M48.062 spinal stenosis of lumbar region with neurogenic claudication, M43.10- degenerative spondylolisthesis  Precautions/Allergies:   Adhesive; Clindamycin; Dilaudid [hydromorphone]; and Keflex [cephalexin]   MD Orders: Evaluate and treat, modalities, core strengthening and stretching MEDICAL/REFERRING DIAGNOSIS:  Low back pain   DATE OF ONSET:   REFERRING PHYSICIAN: Boni Milton*  RETURN PHYSICIAN APPOINTMENT: prn       Pre-treatment Symptoms/Complaints:   Pt states her quads have been sore from land PT. Pain: Initial:   not rated Post Session:  Not rated    Medications Last Reviewed:  2019    Updated Objective Findings:   None Today     TREATMENT:     Aquatic Therapy (45 minutes): Aquatic treatment performed per flow grid for Decreased muscle strength, Decreased endurance, Decreased range of motion, Ease of movement and Low impact and reduced weight bearing activity. Cues provided for posture and balance. Aquatic Exercise Log       Date  19 Date  19 Date  19 Date  19 Date  19   Activity/ Exercise        Walking forward 8 8 8 8 8   Walking backward 8 8 8 8 8   Walking sideways 8 8 8 8 8     Marching 8 8 8 8 8     Goose Step 8 8 8 8 8     Tip toes 3 3 3 3 3     Heels 3 3 3 3 3     Lunges Long step 8 Long step 8 Long step 8 Long step 8 Long step 8   Side step squats        LE Exercises 3. 5' holding rail with big noodle  3. 5' holding rail with big noodle 3. 5' holding rail with big noodle 3. 5' holding rail with big noodle 3. 5' holding rail with big noodle     Hip Flex/Ext 20 B 20 B 20 B 20 B 25 B     Hip Abd/Add 20 B 20 B 20 B 20 B 25 B     Hip IR/ER          Calf raises          Knee Flex 20 with over stretch x3 20 with over stretch x3 20 with over stretch x3 20 with over stretch x3 20 with over stretch x3     Squats          Leg Circles 20/20 B no noodle 20/20 no noodle 20/20 no noodle  20/20 no noodle  25/25 no noodle     Step Ups 20 small step  20 B small step  20 B small step  20 B small step  20 B small step    UE Exercises  3. 5' with purple noodle 3. 5' with purple noodle  3. 5' with purple noodle      Squeeze In  10 10 10      Push Down  10 10 10      Pull Down  10 10 10      Bicep/Tricep        Rows/Press outs  10/10 10/10 10/10     Chi Positions          Trunk Rotation        Deep H2O/ Noodles 7' with noodle and assist  7' with noodle and assist  7' with noodle and assist  7' with noodle and assist  7' with noodle and assist      Stabilization     Combo 3 min     Arms only          Legs only 3 min 3 min 3 min 3 min 3 min   [address] 93 3 min 3 min 3 min 3 min 3 min     Scissors 3 min 3 min 3 min 3 min 3 min     Crab walk        Lower abdominal   work  DKTC 3 min DKTC 3 min DKTC 3 min DKTC 3 min DKTC 3 min     [employer] Portal    Treatment/Session Summary:    · Response to Treatment: This is patients last scheduled aquatic visit. She will transition to land PT with aquatic on her own. · Communication/Consultation:  None today  · Equipment provided today:  None today  · Recommendations/Intent for next treatment session: Next visit will focus on progressing exercises and gait. Effective Dates: 8/6/2019 TO 11/6/2019 (90 days).   Frequency/Duration: 2 times a week for 90 Day(s)   Visit Count:  5  Total Treatment Billable Duration:  45 minutes  PT Patient Time In/Time Out  Time In: 4442  Time Out: 1400 [facility]    Future Appointments   Date Time Provider aTi Fisher   10/1/2019  9:00 AM BRIDGET Suarez [facility]   10/3/2019  1:00 PM BRIDGET Suarez [facility] 10/8/2019  9:45 AM Zaida Aguirre PT [facility]   10/10/2019  9:45 AM Zaida Aguirre, [facility]   10/15/2019 11:15 AM Ke Carrion, PT [facility]   10/17/2019 11:15 AM Zaida [facility]ir[facility]   10/22/2019  9:30 AM [facility] LAB [facility]   10/22/2019 11:15 AM Zaida Aguirre PT [facility]   10/29/2019  9:45 AM Krystyna Mcghee MD [place] [facility]   10/29/2019 11:15 AM BRIDGET [facility]   10/31/2019  9:45 AM BRIDGET [facility]   11/5/2019  9:45 AM Zaida Aguirre PT [facility]   11/6/2019 12:30 PM SFD PHONE APPOINTMENT CULLEN PHILLIPS OR PRE A   11/7/2019  9:45 AM Zaida Aguirre PT [facility]

## 2019-10-01 ENCOUNTER — HOSPITAL ENCOUNTER (OUTPATIENT)
Dept: PHYSICAL THERAPY | Age: 74
Discharge: HOME | End: 2019-10-01
Payer: MEDICARE

## 2019-10-01 PROCEDURE — 97110 THERAPEUTIC EXERCISES: CPT

## 2019-10-01 NOTE — PROGRESS NOTES
Angelita Monteiro  : 1945  [place] / Product Type: Medicare /  [address] at [facility] RICHARD MITCHELL  1101 [facility], [address] [place] [address],[address], [facility] U 91.  [phone]   [phone]       OUTPATIENT PHYSICAL THERAPY: Daily Treatment Note 10/1/2019     ICD-10: Treatment Diagnosis: M48.062 spinal stenosis of lumbar region with neurogenic claudication, M43.10- degenerative spondylolisthesis  Precautions/Allergies:   Adhesive; Clindamycin; Dilaudid [hydromorphone]; and Keflex [cephalexin]   MD Orders: Evaluate and treat, modalities, core strengthening and stretching MEDICAL/REFERRING DIAGNOSIS:  Low back pain   DATE OF ONSET:   REFERRING PHYSICIAN: Onur Gonzalez*  RETURN PHYSICIAN APPOINTMENT: prn       Pre-treatment Symptoms/Complaints:   Pt states she had not done her exercises for a few days and then did them yesterday and it was very hard. Pain: Initial:    Not rated Post Session:  Not rated    Medications Last Reviewed:  10/1/2019    Updated Objective Findings:   able to perform hip flexion in standing today. TREATMENT:     Therapeutic Exercise: ( ):  45 minutes Exercises per grid below to improve mobility, strength and balance. Required moderate visual, verbal and manual cues to promote proper body alignment, promote proper body posture and promote proper body mechanics. Progressed resistance, range and repetitions as indicated.      Date:  19 Date:  19 Date:  9/3/19 Date  9/10/19 Date  19 Date  10/1/19   Activity/Exercise Parameters Parameters Parameters      Seated march 10 10  10  2 x 10 1#   SLR 10  2 x 10 2 x 10 3 x 10 3 x 10 1#   Heel slide 10  With lift 3 x10 3 x 10 with lift 3 x 10    Hip stretching AA- rotators , adduction 5 x 20 sec         SKTC stretching AA 3 x 30        Standing march 10 2 x 10 2 x 10 2 x 10 3 x 10 2 x 10 1#   Standing mini squat 10  Sit to stand 2 x 10 Sit to stand 2 x 10 Sit to stand 3 x 10 Sit to stand 3 x 10   Seated ankle dorsi/plant flex 5 x 20        bridging 2 x 10  2 x 10 2 x 10 3 x 10 3 x 10   Adduction in hook lying with ball 2 x 10  2 x 10      Standing trunk ext 1 x 10 1 x 10   1 x 10    Standing on foam  3 x 45 sec Step ups onto foam 2 x 10      Nautilus knee ext  10# 1 x 10  10# 2 x 10 10# 1 x 10  15# 2 x 10 15# 3 x 10   Calf stretch  3 x 20 sec       Seated abduction  1 x 10 blue band  1 x 10 with blue band  hooklyin with blue t band 3 x 10   Step ups  4' 2 x 10 B       Tandem stance (modified)   4 x 30 sec      Weight shifts in standing   Lateral with lift 2 x 10      Hip flexion       Lift from hooklying 1#   3 x 10                  [employer] Portal    Treatment/Session Summary:    · Response to Treatment:   Pt gets SOB - good effort. Tolerated added wt with ex today. · Communication/Consultation:  None today  · Equipment provided today:  None today  · Recommendations/Intent for next treatment session: Next visit will focus on progressing exercises and gait. Effective Dates: 8/6/2019 TO 11/6/2019 (90 days).   Frequency/Duration: 2 times a week for 90 Day(s)    Visit Count:  5  Total Treatment Billable Duration:  45 minutes  PT Patient Time In/Time Out  Time In: 0900  Time Out: 0945    Rodrick Campbell PT    Future Appointments   Date Time Provider Tai Fisher   10/3/2019  1:00 PM BRIDGET [facility]berg [facility]   10/8/2019  9:45 AM [facility]   10/10/2019  9:45 AM Zaida Bai [facility]   10/15/2019 11:15 AM BRIDGET [facility]   10/17/2019 11:15 AM BRIDGET [facility]   10/22/2019  9:30 AM [facility] LAB [facility]   10/22/2019 11:15 AM BRIDGET [facility]berg [facility]   10/29/2019  9:45 AM Joann Minaya MD [place] [facility]   10/29/2019 11:15 AM Artie Palomares, BRIDGET THOMAS [facility]   10/31/2019  9:45 AM BRIDGET [facility]   11/5/2019  9:45 AM Magdy Sutton PT [facility]   11/6/2019 12:30 PM SFDENIA PHONE APPOINTMENT CULLEN PHILLIPS OR PRE A   11/7/2019  9:45 AM Ricardo FridayZaida PT [facility]

## 2019-10-03 ENCOUNTER — APPOINTMENT (OUTPATIENT)
Dept: PHYSICAL THERAPY | Age: 74
End: 2019-10-03
Payer: MEDICARE

## 2019-10-08 ENCOUNTER — APPOINTMENT (OUTPATIENT)
Dept: PHYSICAL THERAPY | Age: 74
End: 2019-10-08
Payer: MEDICARE

## 2019-10-10 ENCOUNTER — HOSPITAL ENCOUNTER (OUTPATIENT)
Dept: PHYSICAL THERAPY | Age: 74
Discharge: HOME | End: 2019-10-10
Payer: MEDICARE

## 2019-10-10 NOTE — PROGRESS NOTES
Sumanth Delgadillo  : 1945  [place]  Secondary: Bshsi Generic [address] [facility]  at [address], [address] [place] [address],[address], [address] [place] [address]  [phone]   [phone]       OUTPATIENT PHYSICAL THERAPY:Recertification and and Daily Note 2019     ICD-10: Treatment Diagnosis: M48.062 spinal stenosis of lumbar region with neurogenic claudication, M43.10- degenerative spondylolisthesis  Precautions/Allergies:   Adhesive; Clindamycin; Dilaudid [hydromorphone]; and Keflex [cephalexin]   MD Orders: Evaluate and treat, modalities, core strengthening and stretching MEDICAL/REFERRING DIAGNOSIS:  Low back pain   DATE OF ONSET:   REFERRING PHYSICIAN: Osmany JUÁREZ  RETURN PHYSICIAN APPOINTMENT: prn     INITIAL ASSESSMENT:  Ms. Cm Guzman presents after 40 visits of therapy. She has made improvement and met many of the goals below. She continues to have significant weakness and debility. She works hard and is a good candidate to continue with this plan of care toward reaching remaining goals. We will plan to transition to land only visits working toward independent ex program and toward discharge. PROBLEM LIST (Impacting functional limitations):  1. Decreased Strength  2. Decreased Ambulation Ability/Technique  3. Decreased Balance  4. Increased Pain  5. Decreased Activity Tolerance  6. Decreased Flexibility/Joint Mobility  7. Decreased [place] with Home Exercise Program INTERVENTIONS PLANNED: (Treatment may consist of any combination of the following)  1. Home Exercise Program (HEP)  2. Manual Therapy  3. Therapeutic Exercise/Strengthening  4. aquatic therapy   TREATMENT PLAN:  Effective Dates: 2019 TO 2019 (90 days). Frequency/Duration: 2 times a week for 90 Day(s)  GOALS: (Goals have been discussed and agreed upon with patient.)  Short-Term Functional Goals: Time Frame: 4 weeks  1.  Pt independent with HEP to address deficits. MET but requires updating  8/6/19 updated   2. Pt to perform 45 minutes aquatic therapy 2/week consistently. MET 8/6/19  3. Pt to report a decrease in her symptoms. MET 8/6/19  Reports occasional back pain- less than 1/week  4. Assess balance and timed up and go. MET 8/6/19  TUG  14 sec  5. Administer LEFS  MET  8/6/19  Discharge Goals: Time Frame: 12 weeks  1. L hip flexor strength 4/5 to support gait activities. 2.  Pt to ascend and descend 3 steps with one railing. ongoing able to ascend one step with 1 railing 8/6/19  3. Pt able to lift L LE onto mat table or bed without use of UE's. MET 8/6/19  4. Pt indpendent with HEP for maintenance of gains made in PT. Ongoing 8/6/19  5. Improve LEFS by 10 points. Ongoing 8/6/19  48/80  6. Improve TUG By 3-4 seconds indicating more functional gait and decrease likelihood of falls.

## 2019-10-15 ENCOUNTER — HOSPITAL ENCOUNTER (OUTPATIENT)
Dept: PHYSICAL THERAPY | Age: 74
Discharge: HOME | End: 2019-10-15
Payer: MEDICARE

## 2019-10-15 PROCEDURE — 97110 THERAPEUTIC EXERCISES: CPT

## 2019-10-15 NOTE — PROGRESS NOTES
Yi Rudolph  : 1945  [place] / Product Type: Medicare /  [address] at [facility] RICHARD MITCHELL  [facility], [address], [address] [facility]  [phone]   [phone]       OUTPATIENT PHYSICAL THERAPY: Daily Treatment Note 10/15/2019     ICD-10: Treatment Diagnosis: M48.062 spinal stenosis of lumbar region with neurogenic claudication, M43.10- degenerative spondylolisthesis  Precautions/Allergies:   Adhesive; Clindamycin; Dilaudid [hydromorphone]; and Keflex [cephalexin]   MD Orders: Evaluate and treat, modalities, core strengthening and stretching MEDICAL/REFERRING DIAGNOSIS:  Low back pain   DATE OF ONSET:   REFERRING PHYSICIAN: Ilya Vail*  RETURN PHYSICIAN APPOINTMENT: prn       Pre-treatment Symptoms/Complaints:   Pt states she had not done her exercises for a few days and then did them yesterday and it was very hard. Pain: Initial:    Not rated Post Session:  Not rated    Medications Last Reviewed:  10/15/2019    Updated Objective Findings:   None Today     TREATMENT:     Therapeutic Exercise: ( ):  45 minutes Exercises per grid below to improve mobility, strength and balance. Required moderate visual, verbal and manual cues to promote proper body alignment, promote proper body posture and promote proper body mechanics. Progressed resistance, range and repetitions as indicated.      Date:  19 Date:  19 Date:  9/3/19 Date  9/10/19 Date  19 Date  10/1/19 Date  10/15/19   Activity/Exercise Parameters Parameters Parameters       Seated march 10 10  10  2 x 10 1#    SLR 10  2 x 10 2 x 10 3 x 10 3 x 10 1# 3 x 10 2#   Heel slide 10  With lift 3 x10 3 x 10 with lift 3 x 10  3 x 10 2#   Hip stretching AA- rotators , adduction 5 x 20 sec       5 x 20 sec   SKTC stretching AA 3 x 30         Standing march 10 2 x 10 2 x 10 2 x 10 3 x 10 2 x 10 1#    Standing mini squat 10  Sit to stand 2 x 10 Sit to stand 2 x 10 Sit to stand 3 x 10 Sit to stand 3 x 10    Seated ankle dorsi/plant flex 5 x 20         bridging 2 x 10  2 x 10 2 x 10 3 x 10 3 x 10 3 x 10   Adduction in hook lying with ball 2 x 10  2 x 10       Standing trunk ext 1 x 10 1 x 10   1 x 10     Standing on foam  3 x 45 sec Step ups onto foam 2 x 10       Nautilus knee ext  10# 1 x 10  10# 2 x 10 10# 1 x 10  15# 2 x 10 15# 3 x 10 15# 3 x 10   Calf stretch  3 x 20 sec        Seated abduction  1 x 10 blue band  1 x 10 with blue band  hooklyin with blue t band 3 x 10 Hook lying with band 3 x 10 blue band   Step ups  4' 2 x 10 B     10 x toe taps   Tandem stance (modified)   4 x 30 sec       Weight shifts in standing   Lateral with lift 2 x 10       Hip flexion       Lift from hooklying 1#   3 x 10 Lift in hooklying 2#   shuttle       25# 1 x 10  Single leg 1 x 10   HS Nautilus       30# 2 x 10   Heel raises       2 x 10 on foam                   [facility] Portal    Treatment/Session Summary:    · Response to Treatment:   Pt requires assist in and out of shuttle. Did well with exercises. Some shortness of breath with workout - due to endurance. · Communication/Consultation:  None today  · Equipment provided today:  None today  · Recommendations/Intent for next treatment session: Next visit will focus on progressing exercises and gait. Effective Dates: 8/6/2019 TO 11/6/2019 (90 days).   Frequency/Duration: 2 times a week for 90 Day(s)    Visit Count:  5  Total Treatment Billable Duration:  45 minutes       Jose Raul Tejeda PT    Future Appointments   Date Time Provider Tai Fisher   10/15/2019 11:15 [facility]   10/17/2019 11:15 AM BRIDGET Mckeon [facility]   10/22/2019  9:30 AM [facility] LAB SSA [facility]   10/22/2019 11:15 AM BRIDGET Mckeon [facility]   10/29/2019  9:45 AM Casandra Heath MD [place] [facility]   10/29/2019 11:15 [facility]   10/31/2019  9:45 AM Dominik BRIDGET Cerda [facility]   11/5/2019  9:45 AM Susan , BRIDGET THOMAS [facility]   11/6/2019 12:30 PM JACQUELINE PHONE APPOINTMENT CULLEN PHILLIPS OR PRE A   11/7/2019  9:45 AM Susan Yepez, BRIDGET THOMAS [facility]

## 2019-10-17 ENCOUNTER — HOSPITAL ENCOUNTER (OUTPATIENT)
Dept: PHYSICAL THERAPY | Age: 74
Discharge: HOME | End: 2019-10-17
Payer: MEDICARE

## 2019-10-22 ENCOUNTER — HOSPITAL ENCOUNTER (OUTPATIENT)
Dept: PHYSICAL THERAPY | Age: 74
Discharge: HOME | End: 2019-10-22
Payer: MEDICARE

## 2019-10-22 PROCEDURE — 97110 THERAPEUTIC EXERCISES: CPT

## 2019-10-22 NOTE — PROGRESS NOTES
iY Rudolph  : 1945  [place] / Product Type: Medicare /  [address]  at [facility] RICHARD MITCHELL  1101 [facility], [address] [place] [address],[address], [facility] U 91.  [phone]   [phone]       OUTPATIENT PHYSICAL THERAPY: Daily Treatment Note 10/22/2019     ICD-10: Treatment Diagnosis: M48.062 spinal stenosis of lumbar region with neurogenic claudication, M43.10- degenerative spondylolisthesis  Precautions/Allergies:   Adhesive; Clindamycin; Dilaudid [hydromorphone]; and Keflex [cephalexin]   MD Orders: Evaluate and treat, modalities, core strengthening and stretching MEDICAL/REFERRING DIAGNOSIS:  Low back pain   DATE OF ONSET:   REFERRING PHYSICIAN: Ilya Vail*  RETURN PHYSICIAN APPOINTMENT: prn       Pre-treatment Symptoms/Complaints:   Pt states her R hip flexor area began hurting again after she was here last time. She states it was from the leg press machine. Pain: Initial:    5/10 Post Session:  2/10   Medications Last Reviewed:  10/22/2019    Updated Objective Findings:   None Today     TREATMENT:     Therapeutic Exercise: ( ):  45 minutes Exercises per grid below to improve mobility, strength and balance. Required moderate visual, verbal and manual cues to promote proper body alignment, promote proper body posture and promote proper body mechanics. Progressed resistance, range and repetitions as indicated.      Date:  19 Date:  19 Date:  9/3/19 Date  9/10/19 Date  19 Date  10/1/19 Date  10/15/19 Date  10/22/19   Activity/Exercise Parameters Parameters Parameters        Seated march 10 10  10  2 x 10 1#     SLR 10  2 x 10 2 x 10 3 x 10 3 x 10 1# 3 x 10 2# L 0# 2 x 10  R 3# 3 x 10   Heel slide 10  With lift 3 x10 3 x 10 with lift 3 x 10  3 x 10 2#    Hip stretching AA- rotators , adduction 5 x 20 sec       5 x 20 sec 5 x 20 sec   SKTC stretching AA 3 x 30          Standing march 10 2 x 10 2 x 10 2 x 10 3 x 10 2 x 10 1#     Standing mini squat 10  Sit to stand 2 x 10 Sit to stand 2 x 10 Sit to stand 3 x 10 Sit to stand 3 x 10     Seated ankle dorsi/plant flex 5 x 20          bridging 2 x 10  2 x 10 2 x 10 3 x 10 3 x 10 3 x 10 3 x 10   Adduction in hook lying with ball 2 x 10  2 x 10        Standing trunk ext 1 x 10 1 x 10   1 x 10      Standing on foam  3 x 45 sec Step ups onto foam 2 x 10        Nautilus knee ext  10# 1 x 10  10# 2 x 10 10# 1 x 10  15# 2 x 10 15# 3 x 10 15# 3 x 10    Calf stretch  3 x 20 sec         Seated abduction  1 x 10 blue band  1 x 10 with blue band  hooklyin with blue t band 3 x 10 Hook lying with band 3 x 10 blue band    Step ups  4' 2 x 10 B     10 x toe taps    Tandem stance (modified)   4 x 30 sec        Weight shifts in standing   Lateral with lift 2 x 10        Hip flexion       Lift from hooklying 1#   3 x 10 Lift in hooklying 2#    shuttle       25# 1 x 10  Single leg 1 x 10    HS Nautilus       30# 2 x 10    Heel raises       2 x 10 on foam    Standing trunk ext        3 x 10     Hip flexor and quad stretching in supine with assist.  [employer] Portal    Treatment/Session Summary:    · Response to Treatment:   Pt had decreased pain in her L hip flexor area after session but generally has a hard time tolerating land therapy. She has difficulty with mobility and becomes SOB. · Given this we discussed transitioning back to pool program before maintenance with the plan of transitioning to independent maintenance. · Communication/Consultation:  None today  · Equipment provided today:  None today  · Recommendations/Intent for next treatment session: Next visit will focus on progressing exercises and gait. Effective Dates: 8/6/2019 TO 11/6/2019 (90 days).   Frequency/Duration: 2 times a week for 90 Day(s)    Visit Count:  5  Total Treatment Billable Duration:  45 minutes  PT Patient Time In/Time Out  Time In: 1115  Time Out: [address], PT    Future Appointments   Date Time Provider Tai Fisher   10/29/2019  9:45 AM Uzma Pereira MD [place] [facility]   10/29/2019 11:15 AM [facility]arn[facility] Mendoza[facility]   10/31/2019  9:45 AM [facility]IDGET [facility]   11/5/2019  9:45 AM BRIDGET [facility]   11/6/2019 12:30 PM SFD PHONE APPOINTMENT CULLEN PHILLIPS OR PRE A   11/7/2019  9:45 [facility]

## 2019-10-29 ENCOUNTER — HOSPITAL ENCOUNTER (OUTPATIENT)
Dept: PHYSICAL THERAPY | Age: 74
Discharge: HOME | End: 2019-10-29
Payer: MEDICARE

## 2019-10-29 NOTE — PROGRESS NOTES
Dom Olivares  : 1945  [place]  Secondary: Bshsi Generic [address] [facility]  at [facility]  1101 [facility], [address] [place] [address],[address], [place] [address].  [phone]   [phone]      Pt called to cancel appointment today due to conflict with another appointment. Will follow up with patient on next appointment.      Lucia Goncalves, PTA

## 2019-10-31 ENCOUNTER — HOSPITAL ENCOUNTER (OUTPATIENT)
Dept: PHYSICAL THERAPY | Age: 74
Discharge: HOME | End: 2019-10-31
Payer: MEDICARE

## 2019-10-31 PROCEDURE — 97113 AQUATIC THERAPY/EXERCISES: CPT

## 2019-10-31 NOTE — PROGRESS NOTES
Kaci Meza  : 1945  [place] / Product Type: Medicare /  [address]  at [facility] RICHARD MITCHELL  [address] [place] [address], [address], [place] [address].  [phone]   [phone]       OUTPATIENT PHYSICAL THERAPY: Daily Treatment Note 10/31/2019     ICD-10: Treatment Diagnosis: M48.062 spinal stenosis of lumbar region with neurogenic claudication, M43.10- degenerative spondylolisthesis  Precautions/Allergies:   Adhesive; Clindamycin; Dilaudid [hydromorphone]; and Keflex [cephalexin]   MD Orders: Evaluate and treat, modalities, core strengthening and stretching MEDICAL/REFERRING DIAGNOSIS:  Low back pain   DATE OF ONSET:   REFERRING PHYSICIAN: Alexandre Zuniga  RETURN PHYSICIAN APPOINTMENT: prn       Pre-treatment Symptoms/Complaints:   Pt states she has to get used to the water again. Pain: Initial:   not rated Post Session:  Not rated    Medications Last Reviewed:  10/31/2019    Updated Objective Findings:   None Today     TREATMENT:     Aquatic Therapy (45 minutes): Aquatic treatment performed per flow grid for Decreased muscle strength, Decreased endurance, Decreased range of motion, Ease of movement and Low impact and reduced weight bearing activity. Cues provided for posture and balance. Aquatic Exercise Log       Date  19 Date  19 Date  19 Date  19 Date  19 Date  10/31/19   Activity/ Exercise         Walking forward 8 8 8 8 8 6   Walking backward 8 8 8 8 8 6   Walking sideways 8 8 8 8 8 6     Marching 8 8 8 8 8 6     Goose Step 8 8 8 8 8 6     Tip toes 3 3 3 3 3 3     Heels 3 3 3 3 3 3     Lunges Long step 8 Long step 8 Long step 8 Long step 8 Long step 8 Long step 6   Side step squats         LE Exercises 3. 5' holding rail with big noodle  3. 5' holding rail with big noodle 3. 5' holding rail with big noodle 3. 5' holding rail with big noodle 3. 5' holding rail with big noodle 3. 5' holding rail with big noodle      Hip Flex/Ext 20 B 20 B 20 B 20 B 25 B 20 B     Hip Abd/Add 20 B 20 B 20 B 20 B 25 B 20 B     Hip IR/ER           Calf raises           Knee Flex 20 with over stretch x3 20 with over stretch x3 20 with over stretch x3 20 with over stretch x3 20 with over stretch x3 20 B with over stretch x3     Squats           Leg Circles 20/20 B no noodle 20/20 no noodle 20/20 no noodle  20/20 no noodle  25/25 no noodle 20/20 B     Step Ups 20 small step  20 B small step  20 B small step  20 B small step  20 B small step  20 B   UE Exercises  3. 5' with purple noodle 3. 5' with purple noodle  3. 5' with purple noodle       Squeeze In  10 10 10       Push Down  10 10 10       Pull Down  10 10 10       Bicep/Tricep         Rows/Press outs  10/10 10/10 10/10      Chi Positions           Trunk Rotation         Deep H2O/ Noodles 7' with noodle and assist  7' with noodle and assist  7' with noodle and assist  7' with noodle and assist  7' with noodle and assist  7' with noodle and SBA     Stabilization     Combo 3 min      Arms only           Legs only 3 min 3 min 3 min 3 min 3 min 5 min   [address] 3 min 3 min 3 min 3 min 3 min 3 min     Scissors 3 min 3 min 3 min 3 min 3 min 3 min     Crab walk         Lower abdominal   work  DKTC 3 min DKTC 3 min DKTC 3 min DKTC 3 min DKTC 3 min      [facility] Portal    Treatment/Session Summary:    · Response to Treatment:   Pt will have 2 more pool appointments and then transition into the maintaince program. .     · Communication/Consultation:  None today  · Equipment provided today:  None today  · Recommendations/Intent for next treatment session: Next visit will focus on progressing exercises and gait. Effective Dates: 8/6/2019 TO 11/6/2019 (90 days).   Frequency/Duration: 2 times a week for 90 Day(s)   Visit Count:  5  Total Treatment Billable Duration:  45 minutes  PT Patient Time In/Time Out  Time In: 0945  Time Out: [address] [place]    Future Appointments   Date Time Provider Tai Fisher   11/5/2019  9:45 AM Abhijeet Vivar PTA [facility]   11/6/2019 12:30 PM SFD PHONE APPOINTMENT CULLEN PHILLIPS OR PRE A   11/7/2019 12:00 PM Abhijeet Vivar PTA [facility]   11/27/2019  5:45 PM SFE AMY BI ROOM 2 SFERMAM SFE   1/22/2020 11:30 AM SFE DEXA BI GE LUNAR DEXA SFERMAM SFE   3/10/2020  9:10 AM 10 [facility] 10 [facility] 10 [facility]   3/16/2020 10:20 AM Renato Zapien MD S[facility] 10 [facility] 10 [facility]

## 2019-11-05 ENCOUNTER — HOSPITAL ENCOUNTER (OUTPATIENT)
Dept: PHYSICAL THERAPY | Age: 74
Discharge: HOME | End: 2019-11-05
Payer: MEDICARE

## 2019-11-05 PROCEDURE — 97113 AQUATIC THERAPY/EXERCISES: CPT

## 2019-11-05 NOTE — PROGRESS NOTES
Simon Starr  : 1945  [place] / Product Type: Medicare /  [address]  at [facility] RICHARD MITCHELL  [facility], [address] [place] [address],[address], [facility] [address]  [phone]   [phone]       OUTPATIENT PHYSICAL THERAPY: Daily Treatment Note 2019     ICD-10: Treatment Diagnosis: M48.062 spinal stenosis of lumbar region with neurogenic claudication, M43.10- degenerative spondylolisthesis  Precautions/Allergies:   Adhesive; Clindamycin; Dilaudid [hydromorphone]; and Keflex [cephalexin]   MD Orders: Evaluate and treat, modalities, core strengthening and stretching MEDICAL/REFERRING DIAGNOSIS:  Low back pain   DATE OF ONSET:   REFERRING PHYSICIAN: Yandy Lewis*  RETURN PHYSICIAN APPOINTMENT: prn       Pre-treatment Symptoms/Complaints:   Pt states she has to get used to the water again. She says she is a little stiff today. Pain: Initial:   not rated Post Session:  Not rated    Medications Last Reviewed:  2019    Updated Objective Findings:   None Today     TREATMENT:     Aquatic Therapy (45 minutes): Aquatic treatment performed per flow grid for Decreased muscle strength, Decreased endurance, Decreased range of motion, Ease of movement and Low impact and reduced weight bearing activity. Cues provided for posture and balance. Aquatic Exercise Log       Date  19 Date  19 Date  19 Date  19 Date  19 Date  10/31/19 Date  19   Activity/ Exercise          Walking forward 8 8 8 8 8 6 8   Walking backward 8 8 8 8 8 6 8   Walking sideways 8 8 8 8 8 6 8     Marching 8 8 8 8 8 6 8     Goose Step 8 8 8 8 8 6 8     Tip toes 3 3 3 3 3 3 3     Heels 3 3 3 3 3 3 3     Lunges Long step 8 Long step 8 Long step 8 Long step 8 Long step 8 Long step 6 Long step 8   Side step squats          LE Exercises 3. 5' holding rail with big noodle  3. 5' holding rail with big noodle 3. 5' holding rail with big noodle 3.5' holding rail with big noodle 3. 5' holding rail with big noodle 3. 5' holding rail with big noodle  3. 5' holding rail with big noodle      Hip Flex/Ext 20 B 20 B 20 B 20 B 25 B 20 B 20 B     Hip Abd/Add 20 B 20 B 20 B 20 B 25 B 20 B 20 B     Hip IR/ER            Calf raises            Knee Flex 20 with over stretch x3 20 with over stretch x3 20 with over stretch x3 20 with over stretch x3 20 with over stretch x3 20 B with over stretch x3 20 B with over stretch X 3     Squats            Leg Circles 20/20 B no noodle 20/20 no noodle 20/20 no noodle  20/20 no noodle  25/25 no noodle 20/20 B 20/20 B     Step Ups 20 small step  20 B small step  20 B small step  20 B small step  20 B small step  20 B 20 B   UE Exercises  3. 5' with purple noodle 3. 5' with purple noodle  3. 5' with purple noodle        Squeeze In  10 10 10        Push Down  10 10 10        Pull Down  10 10 10        Bicep/Tricep          Rows/Press outs  10/10 10/10 10/10       Chi Positions            Trunk Rotation          Deep H2O/ Noodles 7' with noodle and assist  7' with noodle and assist  7' with noodle and assist  7' with noodle and assist  7' with noodle and assist  7' with noodle and SBA 7' with noodle and SBA     Stabilization     Combo 3 min       Arms only            Legs only 3 min 3 min 3 min 3 min 3 min 5 min 3 min   Askel[address] 93 3 min 3 min 3 min 3 min 3 min 3 min 3 min     Scissors 3 min 3 min 3 min 3 min 3 min 3 min 3 min     Crab walk          Lower abdominal   work  DKTC 3 min DKTC 3 min DKTC 3 min DKTC 3 min DKTC 3 min       [employer] Portal    Treatment/Session Summary:  Pt tolerated treatment well, but had some difficulty getting out of pool and stated that she felt \"stiff\" today.  Focused on letting patient do as much of the treatment as she could without assistance to prepare her for discharge to maintenance program.   · Response to Treatment:  No adverse reactions  · Communication/Consultation:  None today  · Equipment provided today:  None today  · Recommendations/Intent for next treatment session: Next visit will focus on progressing exercises and gait. Effective Dates: 8/6/2019 TO 11/6/2019 (90 days).   Frequency/Duration: 2 times a week for 90 Day(s)   Visit Count:  5  Total Treatment Billable Duration:  45 minutes  PT Patient Time In/Time Out  Time In: 0930  Time Out: [address] [place], [place]    Future Appointments   Date Time Provider Tai Fisher   11/6/2019 12:30 PM SFD PHONE APPOINTMENT CULLEN PHILLIPS OR PRE A   11/7/2019 12:00 PM Mack Romo PTA [facility]   11/27/2019  5:45 PM SFE AMY BI ROOM 2 SFERMAM SFE   1/22/2020 11:30 AM SFE DEXA BI GE LUNAR DEXA SFERMAM SFE   3/10/2020  9:10 AM 10 [facility] LAB [facility] 10 [facility] 10 [facility]   3/16/2020 10:20 AM Casandra Heath MD [facility] 10 [address]

## 2019-11-07 ENCOUNTER — HOSPITAL ENCOUNTER (OUTPATIENT)
Dept: PHYSICAL THERAPY | Age: 74
End: 2019-11-07
Payer: MEDICARE

## 2019-11-11 ENCOUNTER — ANESTHESIA EVENT (OUTPATIENT)
Dept: ENDOSCOPY | Age: 74
End: 2019-11-11
Payer: MEDICARE

## 2019-11-11 RX ORDER — ACETAMINOPHEN 500 MG
500 TABLET ORAL ONCE
Status: CANCELLED | OUTPATIENT
Start: 2019-11-11 | End: 2019-11-11

## 2019-11-11 RX ORDER — NALOXONE HYDROCHLORIDE 0.4 MG/ML
0.1 INJECTION, SOLUTION INTRAMUSCULAR; INTRAVENOUS; SUBCUTANEOUS AS NEEDED
Status: CANCELLED | OUTPATIENT
Start: 2019-11-11 | End: 2019-11-11

## 2019-11-11 RX ORDER — DIPHENHYDRAMINE HYDROCHLORIDE 50 MG/ML
12.5 INJECTION, SOLUTION INTRAMUSCULAR; INTRAVENOUS ONCE
Status: CANCELLED | OUTPATIENT
Start: 2019-11-11 | End: 2019-11-11

## 2019-11-11 RX ORDER — SODIUM CHLORIDE, SODIUM LACTATE, POTASSIUM CHLORIDE, CALCIUM CHLORIDE 600; 310; 30; 20 MG/100ML; MG/100ML; MG/100ML; MG/100ML
75 INJECTION, SOLUTION INTRAVENOUS CONTINUOUS
Status: CANCELLED | OUTPATIENT
Start: 2019-11-11

## 2019-11-11 RX ORDER — ONDANSETRON 2 MG/ML
4 INJECTION INTRAMUSCULAR; INTRAVENOUS ONCE
Status: CANCELLED | OUTPATIENT
Start: 2019-11-11 | End: 2019-11-11

## 2019-11-11 NOTE — PROGRESS NOTES
Pt confirmed arrival time to hospital of 0630 for their 0800 procedure. Pt also confirmed they will have  present.

## 2019-11-12 ENCOUNTER — ANESTHESIA (OUTPATIENT)
Dept: ENDOSCOPY | Age: 74
End: 2019-11-12
Payer: MEDICARE

## 2019-11-12 ENCOUNTER — HOSPITAL ENCOUNTER (OUTPATIENT)
Age: 74
Setting detail: OUTPATIENT SURGERY
Discharge: HOME | End: 2019-11-12
Attending: INTERNAL MEDICINE | Admitting: INTERNAL MEDICINE
Payer: MEDICARE

## 2019-11-12 VITALS
HEART RATE: 76 BPM | OXYGEN SATURATION: 99 % | RESPIRATION RATE: 20 BRPM | SYSTOLIC BLOOD PRESSURE: 148 MMHG | TEMPERATURE: 97.4 F | WEIGHT: 285 LBS | BODY MASS INDEX: 45.8 KG/M2 | HEIGHT: 66 IN | DIASTOLIC BLOOD PRESSURE: 72 MMHG

## 2019-11-12 PROCEDURE — 74011250636 HC RX REV CODE- 250/636: Performed by: ANESTHESIOLOGY

## 2019-11-12 PROCEDURE — 76040000025: Performed by: INTERNAL MEDICINE

## 2019-11-12 PROCEDURE — 74011000250 HC RX REV CODE- 250: Performed by: NURSE ANESTHETIST, CERTIFIED REGISTERED

## 2019-11-12 PROCEDURE — 76060000032 HC ANESTHESIA 0.5 TO 1 HR: Performed by: INTERNAL MEDICINE

## 2019-11-12 PROCEDURE — 74011250636 HC RX REV CODE- 250/636: Performed by: NURSE ANESTHETIST, CERTIFIED REGISTERED

## 2019-11-12 RX ORDER — PROPOFOL 10 MG/ML
INJECTION, EMULSION INTRAVENOUS AS NEEDED
Status: DISCONTINUED | OUTPATIENT
Start: 2019-11-12 | End: 2019-11-12 | Stop reason: HOSPADM

## 2019-11-12 RX ORDER — SODIUM CHLORIDE, SODIUM LACTATE, POTASSIUM CHLORIDE, CALCIUM CHLORIDE 600; 310; 30; 20 MG/100ML; MG/100ML; MG/100ML; MG/100ML
1000 INJECTION, SOLUTION INTRAVENOUS CONTINUOUS
Status: DISCONTINUED | OUTPATIENT
Start: 2019-11-12 | End: 2019-11-12 | Stop reason: HOSPADM

## 2019-11-12 RX ORDER — LIDOCAINE HYDROCHLORIDE 20 MG/ML
INJECTION, SOLUTION EPIDURAL; INFILTRATION; INTRACAUDAL; PERINEURAL AS NEEDED
Status: DISCONTINUED | OUTPATIENT
Start: 2019-11-12 | End: 2019-11-12 | Stop reason: HOSPADM

## 2019-11-12 RX ORDER — PROPOFOL 10 MG/ML
INJECTION, EMULSION INTRAVENOUS
Status: DISCONTINUED | OUTPATIENT
Start: 2019-11-12 | End: 2019-11-12 | Stop reason: HOSPADM

## 2019-11-12 RX ORDER — LIDOCAINE HYDROCHLORIDE 10 MG/ML
0.1 INJECTION INFILTRATION; PERINEURAL AS NEEDED
Status: DISCONTINUED | OUTPATIENT
Start: 2019-11-12 | End: 2019-11-12 | Stop reason: HOSPADM

## 2019-11-12 RX ORDER — FENTANYL CITRATE 50 UG/ML
100 INJECTION, SOLUTION INTRAMUSCULAR; INTRAVENOUS AS NEEDED
Status: DISCONTINUED | OUTPATIENT
Start: 2019-11-12 | End: 2019-11-12 | Stop reason: HOSPADM

## 2019-11-12 RX ORDER — MIDAZOLAM HYDROCHLORIDE 1 MG/ML
2 INJECTION, SOLUTION INTRAMUSCULAR; INTRAVENOUS
Status: DISCONTINUED | OUTPATIENT
Start: 2019-11-12 | End: 2019-11-12 | Stop reason: HOSPADM

## 2019-11-12 RX ADMIN — PROPOFOL 50 MG: 10 INJECTION, EMULSION INTRAVENOUS at 08:13

## 2019-11-12 RX ADMIN — SODIUM CHLORIDE, SODIUM LACTATE, POTASSIUM CHLORIDE, AND CALCIUM CHLORIDE 1000 ML: 600; 310; 30; 20 INJECTION, SOLUTION INTRAVENOUS at 07:11

## 2019-11-12 RX ADMIN — LIDOCAINE HYDROCHLORIDE 100 MG: 20 INJECTION, SOLUTION EPIDURAL; INFILTRATION; INTRACAUDAL; PERINEURAL at 08:13

## 2019-11-12 RX ADMIN — PHENYLEPHRINE HYDROCHLORIDE 50 MCG: 10 INJECTION INTRAVENOUS at 08:49

## 2019-11-12 RX ADMIN — PROPOFOL 180 MCG/KG/MIN: 10 INJECTION, EMULSION INTRAVENOUS at 08:13

## 2019-11-12 NOTE — ANESTHESIA POSTPROCEDURE EVALUATION
Procedure(s):  COLONOSCOPY/BMI 48.    total IV anesthesia    Anesthesia Post Evaluation      Multimodal analgesia: multimodal analgesia used between 6 hours prior to anesthesia start to PACU discharge  Patient location during evaluation: bedside  Patient participation: complete - patient participated  Level of consciousness: responsive to verbal stimuli  Pain management: adequate  Airway patency: patent  Anesthetic complications: no  Cardiovascular status: hemodynamically stable  Respiratory status: spontaneous ventilation  Hydration status: stable        Vitals Value Taken Time   BP 99/58 11/12/2019  8:45 AM   Temp     Pulse 83 11/12/2019  8:49 AM   Resp 16 11/12/2019  8:45 AM   SpO2 99 % 11/12/2019  8:49 AM   Vitals shown include unvalidated device data.

## 2019-11-12 NOTE — H&P
Gastroenterology Outpatient History and Physical    Patient: Toni Garcia    Physician: Gaye Santoyo MD    Vital Signs: Blood pressure 182/76, pulse 82, temperature 97.4 °F (36.3 °C), resp. rate 16, height 5' 6\" (1.676 m), weight 129.3 kg (285 lb), SpO2 98 %, not currently breastfeeding. Allergies: Allergies   Allergen Reactions    Adhesive Rash    Clindamycin Rash    Dilaudid [Hydromorphone] Other (comments)     Difficulty breathing in recovery room, pt requests no additional use    Keflex [Cephalexin] Rash       Chief Complaint: Screening    History of Present Illness: Hx of colon polyps, FH of Colon ca    Justification for Procedure: R/o inflammatory, neoplastic or other pathology in LGI tract.     History:  Past Medical History:   Diagnosis Date    Arthritis     osteo    Back pain     back problems    Edema     PER PT-- BETTER--     GERD (gastroesophageal reflux disease)     controlled with medication (denies hiatal hernia)     Gout 2/26/2013    Hyperlipidemia 2/26/2013    Hypertension     managed with meds    IBS (irritable bowel syndrome)     Mixed hyperlipidemia 10/16/2018    Morbid obesity ([address]     bmi=47    PONV (postoperative nausea and vomiting)     PORTIA--- per pt-- was r/t/ dilaudid    Prediabetes     per pt-- no meds and does not check sqbs at home-- LAST HA1C 5.6-- 10/2019 PER PT    Sciatica     right worse than left    Sleep apnea     plans to be tested in future    Vitamin D deficiency 2/19/2015      Past Surgical History:   Procedure Laterality Date    HX APPENDECTOMY  1954    HX CATARACT REMOVAL Bilateral     2014 - Dr. Abdirahman Anderson Bilateral 2016    Dr. Ghada Rodriguez    right inguinal hernia    HX HERNIA REPAIR  [id number]    umbilical hernia repair    HX HYSTERECTOMY  1986    ovaries remain    HX LAP CHOLECYSTECTOMY  1993    HX ORTHOPAEDIC Right 1982    right elbow surgery replacement x 2    HX ROTATOR CUFF REPAIR Left 2011    HX SHOULDER REPLACEMENT Right 2013    HX TONSILLECTOMY  1955    TOTAL KNEE ARTHROPLASTY Left 2005    TOTAL KNEE ARTHROPLASTY Right 2012      Social History     Socioeconomic History    Marital status:      Spouse name: Not on file    Number of children: Not on file    Years of education: Not on file    Highest education level: Not on file   Tobacco Use    Smoking status: Never Smoker    Smokeless tobacco: Never Used   Substance and Sexual Activity    Alcohol use: No    Drug use: No      Family History   Problem Relation Age of Onset    Cancer Mother         colon    Hypertension Mother     Stroke Mother     Heart Disease Mother         CHF and a Pacemaker    Hypertension Father     Cancer Father         prostate and colon    Cancer Maternal Grandmother         colon    Breast Cancer Neg Hx        Medications:   Prior to Admission medications    Medication Sig Start Date End Date Taking? Authorizing Provider   lisinopril (PRINIVIL, ZESTRIL) 10 mg tablet Take 2 Tabs by mouth two (2) times a day. Patient taking differently: Take 10 mg by mouth two (2) times a day. 6/19/19  Yes Casandra Heath MD   omeprazole (PRILOSEC) 20 mg capsule Take 1 Cap by mouth daily. 6/19/19  Yes Casandra Heath MD   allopurinol (ZYLOPRIM) 300 mg tablet Take 1 Tab by mouth daily. 6/19/19  Yes Casandra Heath MD   pravastatin (PRAVACHOL) 20 mg tablet Take 1 Tab by mouth nightly. 2/4/19  Yes Casandra Heath MD   lactobacillus rhamnosus gg 10 billion cell (CULTURELLE) 10 billion cell capsule Take 1 Cap by mouth two (2) times a day. 10/30/17  Yes Casandra Heath MD   acetaminophen (TYLENOL) 325 mg tablet Take 650 mg by mouth every four (4) hours as needed. Yes Casandra Heath MD   ascorbic acid (VITAMIN C) 500 mg tablet Take one tablet daily to increase iron absorption   Yes Casandra Heath MD   Cholecalciferol, Vitamin D3, 1,000 unit cap Take 2,000 Units by mouth daily.  Indications: hold for 7 days prior to surgery per anesthesia protocol - instructed 3/22/18 8/31/15  Yes Nicki Varner MD   fish oil-dha-epa [phone] mg cap Take 1 Tab by mouth daily. Indications: hold for 7 days prior to surgery per anesthesia protocol - instructed 3/22/18   Yes Provider, Historical   GLUCOSAMINE HCL/CHONDR GRANADOS A NA (OSTEO BI-FLEX PO) Take  by mouth daily. Indications: hold for 7 days prior to surgery per anesthesia protocol - instructed 3/22/18   Yes Provider, Historical   aspirin 81 mg tablet Take 81 mg by mouth daily. Indications: prevention of transient ischemic attack   Yes Provider, Historical   multivitamin (ONE A DAY) tablet Take 1 Tab by mouth every morning. Yes Provider, Historical       Physical Exam:   General: alert, cooperative, no distress   HEENT: Head: Normal, normocephalic, atraumatic. Heart: regular rate and rhythm, S1, S2 normal, no murmur, click, rub or gallop   Lungs: chest clear, no wheezing, rales, normal symmetric air entry   Abdominal: Bowel sounds are normal, liver is not enlarged, spleen is not enlarged   Neurological: Grossly normal   Extremities: 1+ edema     Findings/Diagnosis: Colon screening, high risk    Plan of Care/Planned Procedure: Colonoscopy.     Jany Awan MD

## 2019-11-12 NOTE — PROCEDURES
OPERATIVE NOTE    Date of Procedure: 11/12/2019   Preoperative Diagnosis: Hx of colonic polyp [Z86.010] - last eval with [facility] 2016  FH of colon cancer - mother in 63's and MGM at 68  Postoperative Diagnosis: diverticulosis; internal hemorrhoids     Procedure(s):  COLONOSCOPY/BMI 48  Surgeon(s) and Role:     Boogie Kennedy MD - Primary  Surgical Staff:  Endoscopy Technician-1: Edson Newell  Scrub Tech-1: Karen LOZA  Endoscopy RN-1: Luiz Fournier  No case tracking events are documented in the log. Anesthesia: MAC   Estimated Blood Loss: None  Specimens: * No specimens in log *     Procedure:  After informed consent, the patient was placed in the left lateral position and received iv anesthesia. See anesthesiology records for details. A digital rectal exam was performed. The Colonoscope was inserted into the rectum and passed under direct vison to the cecum where the ileocecal valve and appendiceal orifice were identified. Colonoscope was then slowly withdrawn with careful evaluation of the mucosa. Retroflexion was performed in the rectum. Prep was fair / good. Visualization was adequate. The patient was taken to the recovery area in stable condition. Findings:  RECTUM: Small internal hemorrhoids were noted. COLON: Mild to moderate diverticulosis was noted in the Descending and Sigmoid Colon. Remainder of the Colon exam was within normal limits. TERMINAL ILEUM: The terminal ileum was entered and was normal.    Recommendations:   Routine post colonoscopy instructions. High fiber diet. Softeners and MiraLAX as needed for constipation. Conservative management of diverticulosis and hemorrhoids. Surveillance Colonoscopy recommended in 5 years. Complications: None.     Implants: * No implants in log *    Charles Bernstein MD

## 2019-11-12 NOTE — ADDENDUM NOTE
Addendum  created 11/12/19 1018 by Pepe Wills CRNA    Intraprocedure Flowsheets edited, Intraprocedure Meds edited, Orders acknowledged in Narrator

## 2019-11-12 NOTE — ROUTINE PROCESS
Pt. Discharged to car by Yael Caballero with family . Vital signs stable. Able to tolerate PO fluids. Passing gas.  Seen by MD.

## 2019-11-12 NOTE — ANESTHESIA PREPROCEDURE EVALUATION
Anesthetic History   No history of anesthetic complications            Review of Systems / Medical History  Patient summary reviewed and pertinent labs reviewed    Pulmonary  Within defined limits              Comments: Denies GAVIN symptoms, admits to snoring   Neuro/Psych   Within defined limits           Cardiovascular    Hypertension              Exercise tolerance: <4 METS     GI/Hepatic/Renal     GERD: well controlled           Endo/Other    Diabetes: well controlled    Morbid obesity     Other Findings              Physical Exam    Airway  Mallampati: I  TM Distance: 4 - 6 cm  Neck ROM: normal range of motion   Mouth opening: Normal     Cardiovascular    Rhythm: regular  Rate: normal         Dental         Pulmonary  Breath sounds clear to auscultation               Abdominal         Other Findings            Anesthetic Plan    ASA: 3  Anesthesia type: total IV anesthesia          Induction: Intravenous  Anesthetic plan and risks discussed with: Patient

## 2019-11-12 NOTE — DISCHARGE INSTRUCTIONS
Gastrointestinal Colonoscopy/Flexible Sigmoidoscopy - Lower Exam Discharge Instructions  1. Call Dr. Saritha Pandey at [phone] for any problems or questions. 2. Contact the doctors office for follow up appointment as directed  3. Medication may cause drowsiness for several hours, therefore:  · Do not drive or operate machinery for reminder of the day. · No alcohol today. · Do not make any important or legal decisions for 24 hours. · Do not sign any legal documents for 24 hours. 4. Ordinarily, you may resume regular diet and activity after exam unless otherwise specified by your physician. 5. Because of air put into your colon during exam, you may experience some abdominal distension, relieved by the passage of gas, for several hours. 6. Contact your physician if you have any of the following:  · Excessive amount of bleeding - large amount when having a bowel movement. Occasional specks of blood with bowel movement would not be unusual.  · Severe abdominal pain  · Fever or Chills    Any additional instructions:   Follow up as needed

## 2019-11-21 ENCOUNTER — HOSPITAL ENCOUNTER (OUTPATIENT)
Dept: SLEEP MEDICINE | Age: 74
Discharge: HOME | End: 2019-11-21
Payer: MEDICARE

## 2019-11-21 PROCEDURE — 95806 SLEEP STUDY UNATT&RESP EFFT: CPT

## 2019-11-27 ENCOUNTER — HOSPITAL ENCOUNTER (OUTPATIENT)
Dept: MAMMOGRAPHY | Age: 74
Discharge: HOME | End: 2019-11-27
Attending: INTERNAL MEDICINE
Payer: MEDICARE

## 2019-11-27 DIAGNOSIS — Z12.39 SCREENING FOR BREAST CANCER: ICD-10-CM

## 2019-11-27 DIAGNOSIS — Z12.31 ENCOUNTER FOR SCREENING MAMMOGRAM FOR BREAST CANCER: ICD-10-CM

## 2019-11-27 PROCEDURE — 77067 SCR MAMMO BI INCL CAD: CPT

## 2019-12-12 NOTE — PROGRESS NOTES
Soni Modi  : 1945  [place] / Product Type: Medicare /  [address]  at Replaced by [facility] RICHARD MITCHELL  [facility], [address], [address] [employer] [facility] [employer] [facility]  [phone]   [phone]       OUTPATIENT PHYSICAL THERAPY: Discontinue Note    ICD-10: Treatment Diagnosis: M48.062 spinal stenosis of lumbar region with neurogenic claudication, M43.10- degenerative spondylolisthesis  Precautions/Allergies:   Adhesive; Clindamycin; Dilaudid [hydromorphone]; and Keflex [cephalexin]   MD Orders: Evaluate and treat, modalities, core strengthening and stretching MEDICAL/REFERRING DIAGNOSIS:  Low back pain   DATE OF ONSET:   REFERRING PHYSICIAN: Dane Barlow*  RETURN PHYSICIAN APPOINTMENT: prn     Pt attended 40 visits of PT and did well. She did not return for last appt so no objective measures were taken. She will be discharged at this time.

## 2020-03-10 ENCOUNTER — HOSPITAL ENCOUNTER (OUTPATIENT)
Dept: LAB | Age: 75
Discharge: HOME | End: 2020-03-10
Payer: MEDICARE

## 2020-03-10 DIAGNOSIS — I10 ESSENTIAL HYPERTENSION: ICD-10-CM

## 2020-03-10 DIAGNOSIS — E78.2 MIXED HYPERLIPIDEMIA: ICD-10-CM

## 2020-03-10 DIAGNOSIS — R68.89 OTHER GENERAL SYMPTOMS AND SIGNS: ICD-10-CM

## 2020-03-10 DIAGNOSIS — D50.9 IRON DEFICIENCY ANEMIA, UNSPECIFIED IRON DEFICIENCY ANEMIA TYPE: ICD-10-CM

## 2020-03-10 DIAGNOSIS — E55.9 VITAMIN D DEFICIENCY: ICD-10-CM

## 2020-03-10 DIAGNOSIS — R73.9 HYPERGLYCEMIA: ICD-10-CM

## 2020-03-10 DIAGNOSIS — R94.6 ABNORMAL THYROID FUNCTION TEST: ICD-10-CM

## 2020-03-10 LAB — TSH SERPL DL<=0.005 MIU/L-ACNC: 3.08 UIU/ML (ref 0.36–3.74)

## 2020-03-10 PROCEDURE — 84443 ASSAY THYROID STIM HORMONE: CPT

## 2020-12-28 ENCOUNTER — APPOINTMENT (OUTPATIENT)
Dept: GENERAL RADIOLOGY | Age: 75
End: 2020-12-28
Attending: EMERGENCY MEDICINE
Payer: MEDICARE

## 2020-12-28 ENCOUNTER — HOSPITAL ENCOUNTER (EMERGENCY)
Age: 75
Discharge: HOME | End: 2020-12-28
Attending: EMERGENCY MEDICINE
Payer: MEDICARE

## 2020-12-28 VITALS
HEART RATE: 72 BPM | RESPIRATION RATE: 34 BRPM | OXYGEN SATURATION: 95 % | BODY MASS INDEX: 45 KG/M2 | SYSTOLIC BLOOD PRESSURE: 131 MMHG | HEIGHT: 66 IN | TEMPERATURE: 98.5 F | WEIGHT: 280 LBS | DIASTOLIC BLOOD PRESSURE: 60 MMHG

## 2020-12-28 DIAGNOSIS — U07.1 COVID-19 VIRUS INFECTION: ICD-10-CM

## 2020-12-28 DIAGNOSIS — R06.02 SOB (SHORTNESS OF BREATH): ICD-10-CM

## 2020-12-28 DIAGNOSIS — R05.9 COUGH: Primary | ICD-10-CM

## 2020-12-28 LAB
ALBUMIN SERPL-MCNC: 4 G/DL (ref 3.2–4.6)
ALBUMIN/GLOB SERPL: 1.2 {RATIO} (ref 1.2–3.5)
ALP SERPL-CCNC: 98 U/L (ref 50–130)
ALT SERPL-CCNC: 25 U/L (ref 12–65)
ANION GAP SERPL CALC-SCNC: 7 MMOL/L (ref 7–16)
AST SERPL-CCNC: 26 U/L (ref 15–37)
ATRIAL RATE: 277 BPM
BASOPHILS # BLD: 0 K/UL (ref 0–0.2)
BASOPHILS NFR BLD: 1 % (ref 0–2)
BILIRUB SERPL-MCNC: 0.6 MG/DL (ref 0.2–1.1)
BUN SERPL-MCNC: 21 MG/DL (ref 8–23)
CALCIUM SERPL-MCNC: 8.7 MG/DL (ref 8.3–10.4)
CALCULATED P AXIS, ECG09: 31 DEGREES
CALCULATED R AXIS, ECG10: 45 DEGREES
CALCULATED T AXIS, ECG11: 18 DEGREES
CHLORIDE SERPL-SCNC: 105 MMOL/L (ref 98–107)
CO2 SERPL-SCNC: 28 MMOL/L (ref 21–32)
COVID-19 RAPID TEST, COVR: DETECTED
CREAT SERPL-MCNC: 1.04 MG/DL (ref 0.6–1)
D DIMER PPP FEU-MCNC: 0.76 UG/ML(FEU)
DIAGNOSIS, 93000: NORMAL
DIFFERENTIAL METHOD BLD: ABNORMAL
EOSINOPHIL # BLD: 0 K/UL (ref 0–0.8)
EOSINOPHIL NFR BLD: 1 % (ref 0.5–7.8)
ERYTHROCYTE [DISTWIDTH] IN BLOOD BY AUTOMATED COUNT: 14.2 % (ref 11.9–14.6)
GLOBULIN SER CALC-MCNC: 3.4 G/DL (ref 2.3–3.5)
GLUCOSE SERPL-MCNC: 111 MG/DL (ref 65–100)
HCT VFR BLD AUTO: 37.3 % (ref 35.8–46.3)
HGB BLD-MCNC: 11.9 G/DL (ref 11.7–15.4)
IMM GRANULOCYTES # BLD AUTO: 0 K/UL (ref 0–0.5)
IMM GRANULOCYTES NFR BLD AUTO: 0 % (ref 0–5)
LACTATE SERPL-SCNC: 1.1 MMOL/L (ref 0.4–2)
LYMPHOCYTES # BLD: 1.5 K/UL (ref 0.5–4.6)
LYMPHOCYTES NFR BLD: 41 % (ref 13–44)
MCH RBC QN AUTO: 31.4 PG (ref 26.1–32.9)
MCHC RBC AUTO-ENTMCNC: 31.9 G/DL (ref 31.4–35)
MCV RBC AUTO: 98.4 FL (ref 79.6–97.8)
MONOCYTES # BLD: 0.5 K/UL (ref 0.1–1.3)
MONOCYTES NFR BLD: 13 % (ref 4–12)
NEUTS SEG # BLD: 1.6 K/UL (ref 1.7–8.2)
NEUTS SEG NFR BLD: 45 % (ref 43–78)
NRBC # BLD: 0 K/UL (ref 0–0.2)
PLATELET # BLD AUTO: 147 K/UL (ref 150–450)
PMV BLD AUTO: 9.3 FL (ref 9.4–12.3)
POTASSIUM SERPL-SCNC: 3.9 MMOL/L (ref 3.5–5.1)
PROT SERPL-MCNC: 7.4 G/DL (ref 6.3–8.2)
Q-T INTERVAL, ECG07: 358 MS
QRS DURATION, ECG06: 90 MS
QTC CALCULATION (BEZET), ECG08: 402 MS
RBC # BLD AUTO: 3.79 M/UL (ref 4.05–5.2)
SODIUM SERPL-SCNC: 140 MMOL/L (ref 136–145)
SOURCE, COVRS: ABNORMAL
TROPONIN-HIGH SENSITIVITY: 12.4 PG/ML (ref 0–14)
VENTRICULAR RATE, ECG03: 76 BPM
WBC # BLD AUTO: 3.6 K/UL (ref 4.3–11.1)

## 2020-12-28 PROCEDURE — 93005 ELECTROCARDIOGRAM TRACING: CPT | Performed by: EMERGENCY MEDICINE

## 2020-12-28 PROCEDURE — 84484 ASSAY OF TROPONIN QUANT: CPT

## 2020-12-28 PROCEDURE — 99284 EMERGENCY DEPT VISIT MOD MDM: CPT

## 2020-12-28 PROCEDURE — 80053 COMPREHEN METABOLIC PANEL: CPT

## 2020-12-28 PROCEDURE — 83605 ASSAY OF LACTIC ACID: CPT

## 2020-12-28 PROCEDURE — 74011250637 HC RX REV CODE- 250/637: Performed by: EMERGENCY MEDICINE

## 2020-12-28 PROCEDURE — 85025 COMPLETE CBC W/AUTO DIFF WBC: CPT

## 2020-12-28 PROCEDURE — 71045 X-RAY EXAM CHEST 1 VIEW: CPT

## 2020-12-28 PROCEDURE — 87635 SARS-COV-2 COVID-19 AMP PRB: CPT

## 2020-12-28 PROCEDURE — 85379 FIBRIN DEGRADATION QUANT: CPT

## 2020-12-28 RX ORDER — ALBUTEROL SULFATE 90 UG/1
2 AEROSOL, METERED RESPIRATORY (INHALATION)
Qty: 1 INHALER | Refills: 0 | Status: SHIPPED | OUTPATIENT
Start: 2020-12-28

## 2020-12-28 RX ORDER — ACETAMINOPHEN 325 MG/1
650 TABLET ORAL
Status: DISCONTINUED | OUTPATIENT
Start: 2020-12-28 | End: 2020-12-28 | Stop reason: HOSPADM

## 2020-12-28 RX ORDER — ACETAMINOPHEN 650 MG/1
650 SUPPOSITORY RECTAL
Status: DISCONTINUED | OUTPATIENT
Start: 2020-12-28 | End: 2020-12-28 | Stop reason: HOSPADM

## 2020-12-28 RX ADMIN — ACETAMINOPHEN 650 MG: 325 TABLET, FILM COATED ORAL at 05:28

## 2020-12-28 NOTE — ED NOTES
I have reviewed discharge instructions with the patient. The patient verbalized understanding. Patient left ED via Discharge Method: ambulatory to Home with daughter. Opportunity for questions and clarification provided. Patient given 1 scripts. To continue your aftercare when you leave the hospital, you may receive an automated call from our care team to check in on how you are doing. This is a free service and part of our promise to provide the best care and service to meet your aftercare needs.  If you have questions, or wish to unsubscribe from this service please call [phone]. Thank you for Choosing our [facility].

## 2020-12-28 NOTE — DISCHARGE INSTRUCTIONS
Your Covid test was positive. Drink plenty of fluids    Tylenol 2 tablets every 6hours for fever, chills,  Use inhaler every 6 hours for cough    Check your oxygen saturation 4 times a day while you are active  -- if your heart rate is higher then 110  Or if your oxygen saturation is less than 92% come back to be rechecked! Recent Results (from the past 8 hour(s))   D DIMER    Collection Time: 12/28/20  5:03 AM   Result Value Ref Range    D DIMER 0.76 (H) <0.56 ug/ml(FEU)   CBC WITH AUTOMATED DIFF    Collection Time: 12/28/20  5:04 AM   Result Value Ref Range    WBC 3.6 (L) 4.3 - 11.1 K/uL    RBC 3.79 (L) 4.05 - 5.2 M/uL    HGB 11.9 11.7 - 15.4 g/dL    HCT 37.3 35.8 - 46.3 %    MCV 98.4 (H) 79.6 - 97.8 FL    MCH 31.4 26.1 - 32.9 PG    MCHC 31.9 31.4 - 35.0 g/dL    RDW 14.2 11.9 - 14.6 %    PLATELET 756 (L) 379 - 450 K/uL    MPV 9.3 (L) 9.4 - 12.3 FL    ABSOLUTE NRBC 0.00 0.0 - 0.2 K/uL    DF AUTOMATED      NEUTROPHILS 45 43 - 78 %    LYMPHOCYTES 41 13 - 44 %    MONOCYTES 13 (H) 4.0 - 12.0 %    EOSINOPHILS 1 0.5 - 7.8 %    BASOPHILS 1 0.0 - 2.0 %    IMMATURE GRANULOCYTES 0 0.0 - 5.0 %    ABS. NEUTROPHILS 1.6 (L) 1.7 - 8.2 K/UL    ABS. LYMPHOCYTES 1.5 0.5 - 4.6 K/UL    ABS. MONOCYTES 0.5 0.1 - 1.3 K/UL    ABS. EOSINOPHILS 0.0 0.0 - 0.8 K/UL    ABS. BASOPHILS 0.0 0.0 - 0.2 K/UL    ABS. IMM. GRANS. 0.0 0.0 - 0.5 K/UL   METABOLIC PANEL, COMPREHENSIVE    Collection Time: 12/28/20  5:04 AM   Result Value Ref Range    Sodium 140 136 - 145 mmol/L    Potassium 3.9 3.5 - 5.1 mmol/L    Chloride 105 98 - 107 mmol/L    CO2 28 21 - 32 mmol/L    Anion gap 7 7 - 16 mmol/L    Glucose 111 (H) 65 - 100 mg/dL    BUN 21 8 - 23 MG/DL    Creatinine 1.04 (H) 0.6 - 1.0 MG/DL    GFR est AA >60 >60 ml/min/1.73m2    GFR est non-AA 55 (L) >60 ml/min/1.73m2    Calcium 8.7 8.3 - 10.4 MG/DL    Bilirubin, total 0.6 0.2 - 1.1 MG/DL    ALT (SGPT) 25 12 - 65 U/L    AST (SGOT) 26 15 - 37 U/L    Alk.  phosphatase 98 50 - 130 U/L    Protein, total 7.4 6.3 - 8.2 g/dL    Albumin 4.0 3.2 - 4.6 g/dL    Globulin 3.4 2.3 - 3.5 g/dL    A-G Ratio 1.2 1.2 - 3.5     TROPONIN-HIGH SENSITIVITY    Collection Time: 12/28/20  5:04 AM   Result Value Ref Range    Troponin-High Sensitivity 12.4 0 - 14 pg/mL   SARS-COV-2    Collection Time: 12/28/20  5:24 AM   Result Value Ref Range    Specimen source Nasopharyngeal      COVID-19 rapid test Detected (AA) NOTD     LACTIC ACID    Collection Time: 12/28/20  5:24 AM   Result Value Ref Range    Lactic acid 1.1 0.4 - 2.0 MMOL/L

## 2020-12-28 NOTE — ED TRIAGE NOTES
Pt to the Ed with daughter with c/o of skin burning sensation on her chest and bottoms of her feet. Pt states that this started yesterday and has gotten worse. Pt states that she has some SOB with activities. Pt has a dry nonproductive cough. Pt states that she has not been around anyone sick, wears her mask, and social distances.        Pt masked prior to arrival.

## 2020-12-28 NOTE — ED PROVIDER NOTES
[facility] EMERGENCY DEPT   Arrival Date/Time: 12/28/2020 @ Mando Verdugo  MRN: [id number]      77 y.o. female    YOB: 1945   Telephone Information:   Mobile [phone] (home)     Seen in: ER06/06  Seen on 12/28/2020 @ 5:13 AM       Today's Chief Complaint:   Chief Complaint   Patient presents with    Chest burn    Cough     HPI ([address]): 60-year-old female presents to the emergency department with her daughter secondary to burning pain all over well as cough. Intermittent problems breathing    Started several days ago. Worse this morning    She has been using Robitussin with honey with minimal relief. No fevers documented although she does complain of subjective burning all over her body. She states that 2 of her grandchildren have colds but denies other sick contacts. HPI    Review of Systems (10+): Review of Systems   Constitutional: Negative for activity change, appetite change, chills and fever. HENT: Negative. Respiratory: Positive for cough. Cardiovascular: Positive for chest pain. Skin: Negative. Neurological: Negative. Psychiatric/Behavioral: Negative. Past Medical History: Primary Care Doctor: Libra Orozco MD  [X] Meds, Medical Hx, Surgical Hx, Family Hx are reviewed & located at the end of this note. SOCIAL History:   Tobacco:  reports that she has never smoked. She has never used smokeless tobacco.    Alcohol:  reports no history of alcohol use. Drugs:  reports no history of drug use. Allergies: Allergies   Allergen Reactions    Adhesive Rash    Clindamycin Rash    Dilaudid [Hydromorphone] Other (comments)     Difficulty breathing in recovery room, pt requests no additional use    Keflex [Cephalexin] Rash         Hayes Anti-Platelet Anticoagulant Meds             aspirin 81 mg tablet Take 81 mg by mouth daily.  Indications: prevention of transient ischemic attack          Physical Exam (8+):  [X] Nursing documentation reviewed. Patient Vitals for the past 24 hrs:   Temp Pulse Resp BP SpO2   12/28/20 0543  68 19 138/60 97 %   12/28/20 0533  73 (!) 54 (!) 134/58 97 %   12/28/20 0501  73 (!) 37 (!) 178/77 99 %   12/28/20 0457 98.5 °F (36.9 °C) 82 22 (!) 202/87 99 %      Estimated body mass index is 45.19 kg/m² as calculated from the following:    Height as of this encounter: 5' 6\" (1.676 m). Weight as of this encounter: 127 kg (280 lb). Vital signs were reviewed. Physical Exam  Vitals signs and nursing note reviewed. Constitutional:       General: She is not in acute distress. Appearance: Normal appearance. She is not ill-appearing or toxic-appearing. HENT:      Head: Normocephalic. Mouth/Throat:      Mouth: Mucous membranes are moist.   Eyes:      Extraocular Movements: Extraocular movements intact. Pupils: Pupils are equal, round, and reactive to light. Neck:      Musculoskeletal: Normal range of motion. Cardiovascular:      Rate and Rhythm: Normal rate and regular rhythm. Pulses: Normal pulses. Heart sounds: Normal heart sounds. Pulmonary:      Effort: Pulmonary effort is normal. No respiratory distress. Breath sounds: Normal breath sounds. No wheezing or rales. Abdominal:      General: There is no distension. Tenderness: There is no abdominal tenderness. There is no guarding or rebound. Musculoskeletal: Normal range of motion. General: No swelling. Skin:     General: Skin is warm. Capillary Refill: Capillary refill takes less than 2 seconds. Neurological:      Mental Status: She is alert and oriented to person, place, and time. Psychiatric:         Mood and Affect: Mood normal.         Behavior: Behavior normal.         [facility]  MEDICAL DECISION MAKING: (Including Differential Dx, and Plan)   44-year-old female complains of not feeling well. Burning pain. Cough. No alleviating. Differential Diagnosis:  In pneumonia, Covid infection, pleurisy, other viral syndrome. Plan: labs, CXR, ECG,     This is a new problem that does need additional workup   I requested old records. Labs/Radiograph/ECG were ordered: yesECG/CT/US/XRay/MRI visualized by me: yes   Obtained and Reviewed Old Records or History from someone other than the patient: yes-- family     The patient's problem is:  moderate    Diagnostic Options are:  minimal risk    Management Options are:  moderate risk     Data/Management:  (.addold, . addecg)   Lab findings during this visit:   Recent Results (from the past 48 hour(s))   D DIMER    Collection Time: 12/28/20  5:03 AM   Result Value Ref Range    D DIMER 0.76 (H) <0.56 ug/ml(FEU)   CBC WITH AUTOMATED DIFF    Collection Time: 12/28/20  5:04 AM   Result Value Ref Range    WBC 3.6 (L) 4.3 - 11.1 K/uL    RBC 3.79 (L) 4.05 - 5.2 M/uL    HGB 11.9 11.7 - 15.4 g/dL    HCT 37.3 35.8 - 46.3 %    MCV 98.4 (H) 79.6 - 97.8 FL    MCH 31.4 26.1 - 32.9 PG    MCHC 31.9 31.4 - 35.0 g/dL    RDW 14.2 11.9 - 14.6 %    PLATELET 142 (L) 401 - 450 K/uL    MPV 9.3 (L) 9.4 - 12.3 FL    ABSOLUTE NRBC 0.00 0.0 - 0.2 K/uL    DF AUTOMATED      NEUTROPHILS 45 43 - 78 %    LYMPHOCYTES 41 13 - 44 %    MONOCYTES 13 (H) 4.0 - 12.0 %    EOSINOPHILS 1 0.5 - 7.8 %    BASOPHILS 1 0.0 - 2.0 %    IMMATURE GRANULOCYTES 0 0.0 - 5.0 %    ABS. NEUTROPHILS 1.6 (L) 1.7 - 8.2 K/UL    ABS. LYMPHOCYTES 1.5 0.5 - 4.6 K/UL    ABS. MONOCYTES 0.5 0.1 - 1.3 K/UL    ABS. EOSINOPHILS 0.0 0.0 - 0.8 K/UL    ABS. BASOPHILS 0.0 0.0 - 0.2 K/UL    ABS. IMM.  GRANS. 0.0 0.0 - 0.5 K/UL   METABOLIC PANEL, COMPREHENSIVE    Collection Time: 12/28/20  5:04 AM   Result Value Ref Range    Sodium 140 136 - 145 mmol/L    Potassium 3.9 3.5 - 5.1 mmol/L    Chloride 105 98 - 107 mmol/L    CO2 28 21 - 32 mmol/L    Anion gap 7 7 - 16 mmol/L    Glucose 111 (H) 65 - 100 mg/dL    BUN 21 8 - 23 MG/DL    Creatinine 1.04 (H) 0.6 - 1.0 MG/DL    GFR est AA >60 >60 ml/min/1.73m2    GFR est non-AA 55 (L) >60 ml/min/1.73m2    Calcium 8.7 8.3 - 10.4 MG/DL    Bilirubin, total 0.6 0.2 - 1.1 MG/DL    ALT (SGPT) 25 12 - 65 U/L    AST (SGOT) 26 15 - 37 U/L    Alk. phosphatase 98 50 - 130 U/L    Protein, total 7.4 6.3 - 8.2 g/dL    Albumin 4.0 3.2 - 4.6 g/dL    Globulin 3.4 2.3 - 3.5 g/dL    A-G Ratio 1.2 1.2 - 3.5     TROPONIN-HIGH SENSITIVITY    Collection Time: 12/28/20  5:04 AM   Result Value Ref Range    Troponin-High Sensitivity 12.4 0 - 14 pg/mL   SARS-COV-2    Collection Time: 12/28/20  5:24 AM   Result Value Ref Range    Specimen source Nasopharyngeal      COVID-19 rapid test Detected (AA) NOTD     LACTIC ACID    Collection Time: 12/28/20  5:24 AM   Result Value Ref Range    Lactic acid 1.1 0.4 - 2.0 MMOL/L     Radiology studies during this visit: Xr Chest Port    Result Date: 12/28/2020  IMPRESSION: No acute process. Medications given in the ED:   Medications   acetaminophen (TYLENOL) tablet 650 mg (650 mg Oral Given 12/28/20 0528)     Or   acetaminophen (TYLENOL) suppository 650 mg ( Rectal See Alternative 12/28/20 0528)        Procedure Documentation:    (.addlac  .addabscess   . addreduction   . addintubation    . addprocdoc)      Procedures    Recheck and Additional Documentation:  (use .addrecheck  . addsepsis   . addstroke   . addhip  .addcctime . emergcnt )     Patient's Covid test was positive. She looks well. Maintaining oxygen saturations of 97 to 98%. She is not tachycardic or febrile    Lactate and troponin are normal.  D-dimer is minimally elevated but is probably normal for her age    [place] at length with the patient and her family    Advised her to get a oxygen monitor for home and check her self several times a day. Drink plenty of fluids. Use the inhaler. Come back if she worsens or has new problems. Other ED Course Notes:        I wore appropriate PPE throughout this patient's ED encounter. Impression and Disposition: (.gohome     .goupstairs   . addhandoff  ) Disposition:   Discharge to home @ 0166  in stable condition. Impression:     ICD-10-CM ICD-9-CM   1. Cough  R05 786.2   2. SOB (shortness of breath)  R06.02 786.05   3. COVID-19 virus infection  U07.1 079.89        Follow-up:   Follow-up Information     Follow up With Specialties Details Why Contact Info    Jessika Caro MD Internal Medicine   [address]   [phone]      [facility] EMERGENCY DEPT Emergency Medicine  Come back to the ED if you get worse or develop any new problems [address]  [id number] [phone]         Discharge Medications:   Current Discharge Medication List      START taking these medications    Details   albuterol (PROVENTIL HFA, VENTOLIN HFA, PROAIR HFA) 90 mcg/actuation inhaler Take 2 Puffs by inhalation every six (6) hours as needed for Wheezing.   Qty: 1 Inhaler, Refills: 0               Past Medical History:      Past Medical History:   Diagnosis Date    Arthritis     osteo    Back pain     back problems    Edema     PER PT-- BETTER--     GERD (gastroesophageal reflux disease)     controlled with medication (denies hiatal hernia)     Gout 2/26/2013    Hyperlipidemia 2/26/2013    Hypertension     managed with meds    IBS (irritable bowel syndrome)     Mixed hyperlipidemia 10/16/2018    Morbid obesity ([address]     bmi=47    PONV (postoperative nausea and vomiting)     PORTIA--- per pt-- was r/t/ dilaudid    Prediabetes     per pt-- no meds and does not check sqbs at home-- LAST HA1C 5.6-- 10/2019 PER PT    Sciatica     right worse than left    Sleep apnea     plans to be tested in future    Vitamin D deficiency 2/19/2015     Past Surgical History:   Procedure Laterality Date    COLONOSCOPY N/A 11/12/2019    COLONOSCOPY/BMI 48 performed by Tatianna Wilson MD at [address] [place] [address] CATARACT REMOVAL Bilateral     2014 - Dr. Senait Estrada Bilateral 2016 Dr. Jose Freitas    right inguinal hernia    HX HERNIA REPAIR  [phone]    umbilical hernia repair    HX HYSTERECTOMY  1986    ovaries remain    HX LAP CHOLECYSTECTOMY  1993    HX ORTHOPAEDIC Right 1982    right elbow surgery replacement x 2    HX ROTATOR CUFF REPAIR Left 2011    HX SHOULDER REPLACEMENT Right 2013    HX TONSILLECTOMY  1955    TOTAL KNEE ARTHROPLASTY Left 2005    TOTAL KNEE ARTHROPLASTY Right 2012     Family History   Problem Relation Age of Onset    Cancer Mother         colon    Hypertension Mother     Stroke Mother     Heart Disease Mother         CHF and a Pacemaker    Hypertension Father     Cancer Father         prostate and colon    Cancer Maternal Grandmother         colon    Breast Cancer Neg Hx       Social History     Tobacco Use    Smoking status: Never Smoker    Smokeless tobacco: Never Used   Substance Use Topics    Alcohol use: No    Drug use: No     Prior to Admission Medications   Prescriptions Last Dose Informant Patient Reported? Taking? Cholecalciferol, Vitamin D3, 1,000 unit cap   Yes No   Sig: Take 2,000 Units by mouth daily. Indications: hold for 7 days prior to surgery per anesthesia protocol - instructed 3/22/18   GLUCOSAMINE HCL/CHONDR GRANADOS A NA (OSTEO BI-FLEX PO)   Yes No   Sig: Take  by mouth daily. Indications: hold for 7 days prior to surgery per anesthesia protocol - instructed 3/22/18   acetaminophen (Tylenol Arthritis Pain) 650 mg TbER   Yes No   Sig: Take 1 Tab by mouth every twelve (12) hours. allopurinoL (ZYLOPRIM) 300 mg tablet   No No   Sig: TAKE ONE TABLET BY MOUTH ONE TIME DAILY   ascorbic acid (VITAMIN C) 500 mg tablet   Yes No   Sig: Take one tablet daily to increase iron absorption   aspirin 81 mg tablet   Yes No   Sig: Take 81 mg by mouth daily. Indications: prevention of transient ischemic attack   elderberry fruit (ELDERBERRY PO)   Yes No   Sig: Take  by mouth daily.    fish oil-dha-epa 1,200-144-216 mg cap   Yes No   Sig: Take 1 Tab by mouth daily. Indications: hold for 7 days prior to surgery per anesthesia protocol - instructed 3/22/18   lactobacillus rhamnosus gg 10 billion cell (CULTURELLE) 10 billion cell capsule   Yes No   Sig: Take 1 Cap by mouth two (2) times a day. lisinopril (PRINIVIL, ZESTRIL) 20 mg tablet   No No   Sig: Take 1 Tab by mouth two (2) times a day. magnesium oxide (MAG-OX) 400 mg tablet   Yes No   Sig: Take 1-2 tabs by mouth at bedtime   multivitamin (ONE A DAY) tablet   Yes No   Sig: Take 1 Tab by mouth every morning. omeprazole (PriLOSEC) 20 mg capsule   No No   Sig: Take 1 Cap by mouth daily. pravastatin (PRAVACHOL) 20 mg tablet   No No   Sig: Take 1 Tab by mouth nightly.       Facility-Administered Medications: None

## 2021-04-26 ENCOUNTER — TRANSCRIBE ORDER (OUTPATIENT)
Dept: SCHEDULING | Age: 76
End: 2021-04-26

## 2021-04-26 DIAGNOSIS — Z12.31 ENCOUNTER FOR SCREENING MAMMOGRAM FOR MALIGNANT NEOPLASM OF BREAST: Primary | ICD-10-CM

## 2021-05-21 ENCOUNTER — HOSPITAL ENCOUNTER (OUTPATIENT)
Dept: MAMMOGRAPHY | Age: 76
Discharge: HOME | End: 2021-05-21
Attending: INTERNAL MEDICINE
Payer: MEDICARE

## 2021-05-21 DIAGNOSIS — Z12.31 ENCOUNTER FOR SCREENING MAMMOGRAM FOR MALIGNANT NEOPLASM OF BREAST: ICD-10-CM

## 2021-05-21 PROCEDURE — 77067 SCR MAMMO BI INCL CAD: CPT

## 2021-08-12 ENCOUNTER — HOSPITAL ENCOUNTER (EMERGENCY)
Age: 76
Discharge: HOME | End: 2021-08-12
Attending: EMERGENCY MEDICINE
Payer: MEDICARE

## 2021-08-12 VITALS
TEMPERATURE: 98 F | HEART RATE: 78 BPM | OXYGEN SATURATION: 98 % | BODY MASS INDEX: 46.65 KG/M2 | DIASTOLIC BLOOD PRESSURE: 71 MMHG | SYSTOLIC BLOOD PRESSURE: 158 MMHG | RESPIRATION RATE: 18 BRPM | WEIGHT: 280 LBS | HEIGHT: 65 IN

## 2021-08-12 DIAGNOSIS — R07.89 ATYPICAL CHEST PAIN: Primary | ICD-10-CM

## 2021-08-12 LAB
ALBUMIN SERPL-MCNC: 3.7 G/DL (ref 3.2–4.6)
ALBUMIN/GLOB SERPL: 1 {RATIO} (ref 1.2–3.5)
ALP SERPL-CCNC: 85 U/L (ref 50–136)
ALT SERPL-CCNC: 20 U/L (ref 12–65)
ANION GAP SERPL CALC-SCNC: 7 MMOL/L (ref 7–16)
AST SERPL-CCNC: 15 U/L (ref 15–37)
ATRIAL RATE: 80 BPM
BASOPHILS # BLD: 0.1 K/UL (ref 0–0.2)
BASOPHILS NFR BLD: 1 % (ref 0–2)
BILIRUB SERPL-MCNC: 0.5 MG/DL (ref 0.2–1.1)
BUN SERPL-MCNC: 22 MG/DL (ref 8–23)
CALCIUM SERPL-MCNC: 8.7 MG/DL (ref 8.3–10.4)
CALCULATED P AXIS, ECG09: 37 DEGREES
CALCULATED R AXIS, ECG10: 7 DEGREES
CALCULATED T AXIS, ECG11: 42 DEGREES
CHLORIDE SERPL-SCNC: 108 MMOL/L (ref 98–107)
CO2 SERPL-SCNC: 26 MMOL/L (ref 21–32)
CREAT SERPL-MCNC: 0.99 MG/DL (ref 0.6–1)
DIAGNOSIS, 93000: NORMAL
DIFFERENTIAL METHOD BLD: ABNORMAL
EOSINOPHIL # BLD: 0.2 K/UL (ref 0–0.8)
EOSINOPHIL NFR BLD: 2 % (ref 0.5–7.8)
ERYTHROCYTE [DISTWIDTH] IN BLOOD BY AUTOMATED COUNT: 14.4 % (ref 11.9–14.6)
GLOBULIN SER CALC-MCNC: 3.6 G/DL (ref 2.3–3.5)
GLUCOSE SERPL-MCNC: 119 MG/DL (ref 65–100)
HCT VFR BLD AUTO: 38.1 % (ref 35.8–46.3)
HGB BLD-MCNC: 12 G/DL (ref 11.7–15.4)
IMM GRANULOCYTES # BLD AUTO: 0 K/UL (ref 0–0.5)
IMM GRANULOCYTES NFR BLD AUTO: 1 % (ref 0–5)
LIPASE SERPL-CCNC: 140 U/L (ref 73–393)
LYMPHOCYTES # BLD: 2.2 K/UL (ref 0.5–4.6)
LYMPHOCYTES NFR BLD: 31 % (ref 13–44)
MAGNESIUM SERPL-MCNC: 2.2 MG/DL (ref 1.8–2.4)
MCH RBC QN AUTO: 31.2 PG (ref 26.1–32.9)
MCHC RBC AUTO-ENTMCNC: 31.5 G/DL (ref 31.4–35)
MCV RBC AUTO: 99 FL (ref 79.6–97.8)
MONOCYTES # BLD: 0.5 K/UL (ref 0.1–1.3)
MONOCYTES NFR BLD: 7 % (ref 4–12)
NEUTS SEG # BLD: 4 K/UL (ref 1.7–8.2)
NEUTS SEG NFR BLD: 58 % (ref 43–78)
NRBC # BLD: 0 K/UL (ref 0–0.2)
P-R INTERVAL, ECG05: 154 MS
PLATELET # BLD AUTO: 189 K/UL (ref 150–450)
PMV BLD AUTO: 9.9 FL (ref 9.4–12.3)
POTASSIUM SERPL-SCNC: 3.8 MMOL/L (ref 3.5–5.1)
PROT SERPL-MCNC: 7.3 G/DL (ref 6.3–8.2)
Q-T INTERVAL, ECG07: 382 MS
QRS DURATION, ECG06: 90 MS
QTC CALCULATION (BEZET), ECG08: 440 MS
RBC # BLD AUTO: 3.85 M/UL (ref 4.05–5.2)
SODIUM SERPL-SCNC: 141 MMOL/L (ref 136–145)
TROPONIN-HIGH SENSITIVITY: 7.3 PG/ML (ref 0–14)
TROPONIN-HIGH SENSITIVITY: 9.8 PG/ML (ref 0–14)
VENTRICULAR RATE, ECG03: 80 BPM
WBC # BLD AUTO: 7 K/UL (ref 4.3–11.1)

## 2021-08-12 PROCEDURE — 84484 ASSAY OF TROPONIN QUANT: CPT

## 2021-08-12 PROCEDURE — 80053 COMPREHEN METABOLIC PANEL: CPT

## 2021-08-12 PROCEDURE — 99285 EMERGENCY DEPT VISIT HI MDM: CPT

## 2021-08-12 PROCEDURE — 83690 ASSAY OF LIPASE: CPT

## 2021-08-12 PROCEDURE — 85025 COMPLETE CBC W/AUTO DIFF WBC: CPT

## 2021-08-12 PROCEDURE — 83735 ASSAY OF MAGNESIUM: CPT

## 2021-08-12 PROCEDURE — 93005 ELECTROCARDIOGRAM TRACING: CPT

## 2021-08-12 RX ORDER — SODIUM CHLORIDE 0.9 % (FLUSH) 0.9 %
5-10 SYRINGE (ML) INJECTION EVERY 8 HOURS
Status: DISCONTINUED | OUTPATIENT
Start: 2021-08-12 | End: 2021-08-12 | Stop reason: HOSPADM

## 2021-08-12 RX ORDER — SODIUM CHLORIDE 0.9 % (FLUSH) 0.9 %
5-10 SYRINGE (ML) INJECTION AS NEEDED
Status: DISCONTINUED | OUTPATIENT
Start: 2021-08-12 | End: 2021-08-12 | Stop reason: HOSPADM

## 2021-08-12 NOTE — ED PROVIDER NOTES
78-year-old lady presents with concerns about pressure in her chest that started at about 930 this morning. Patient says she has no history of coronary artery disease. She says she does have some ear to bowel and sometimes that will radiate into her chest.  She said this felt a little bit different. With her symptoms she had no difficulty breathing. She had no diaphoresis. She had no nausea, vomiting, or diarrhea. She says she does have a history of some extra heartbeats. She has seen a cardiologist, she said with [place] [facility] but review of records appears to indicate that she was seen by [facility] cardiology. She says that she has not had any stress testing. She denies smoking. No other associated symptoms. Elements of this note were created using speech recognition software. As such, errors of speech recognition may be present.            Past Medical History:   Diagnosis Date    Arthritis     osteo    Back pain     back problems    Edema     PER PT-- BETTER--     GERD (gastroesophageal reflux disease)     controlled with medication (denies hiatal hernia)     Gout 2/26/2013    Hyperlipidemia 2/26/2013    Hypertension     managed with meds    IBS (irritable bowel syndrome)     Mixed hyperlipidemia 10/16/2018    Morbid obesity (HCC)     bmi=47    PONV (postoperative nausea and vomiting)     PORTIA--- per pt-- was r/t/ dilaudid    Prediabetes     per pt-- no meds and does not check sqbs at home-- LAST HA1C 5.6-- 10/2019 PER PT    Sciatica     right worse than left    Sleep apnea     plans to be tested in future    Vitamin D deficiency 2/19/2015       Past Surgical History:   Procedure Laterality Date    COLONOSCOPY N/A 11/12/2019    COLONOSCOPY/BMI 48 performed by Damaris Dean MD at [address] [place] [address] CATARACT REMOVAL Bilateral     2014 - Dr. Cassy Vila Bilateral 2016    Dr. Alvarez Verdugo    right inguinal hernia    HX HERNIA REPAIR  [id number]    umbilical hernia repair    HX HYSTERECTOMY  1986    ovaries remain    HX LAP CHOLECYSTECTOMY  1993    HX ORTHOPAEDIC Right 1982    right elbow surgery replacement x 2    HX ROTATOR CUFF REPAIR Left 2011    HX SHOULDER REPLACEMENT Right 2013    HX TONSILLECTOMY  1955    NC TOTAL KNEE ARTHROPLASTY Left 2005    NC TOTAL KNEE ARTHROPLASTY Right 2012         Family History:   Problem Relation Age of Onset   [place] Cancer Mother         colon    Hypertension Mother     Stroke Mother     Heart Disease Mother         CHF and a Pacemaker    Hypertension Father     Cancer Father         prostate and colon    Cancer Maternal Grandmother         colon    Breast Cancer Neg Hx        Social History     Socioeconomic History    Marital status:      Spouse name: Not on file    Number of children: Not on file    Years of education: Not on file    Highest education level: Not on file   Occupational History    Not on file   Tobacco Use    Smoking status: Never Smoker    Smokeless tobacco: Never Used   Substance and Sexual Activity    Alcohol use: No    Drug use: No    Sexual activity: Not on file   Other Topics Concern    Not on file   Social History Narrative    Not on file     Social Determinants of Health     Financial Resource Strain:     Difficulty of Paying Living Expenses:    Food Insecurity:     Worried About Running Out of Food in the Last Year:     [address] in the Last Year:    Transportation Needs:     Lack of Transportation (Medical):      Lack of Transportation (Non-Medical):    Physical Activity:     Days of Exercise per Week:     Minutes of Exercise per Session:    Stress:     Feeling of Stress :    Social Connections:     Frequency of Communication with Friends and Family:     Frequency of Social Gatherings with Friends and Family:     Attends [employer] Services:     Active Member of Clubs or Organizations:     Attends Club or Organization Meetings:     Marital Status:    Intimate Partner Violence:     Fear of Current or Ex-Partner:     Emotionally Abused:     Physically Abused:     Sexually Abused: ALLERGIES: Adhesive, Clindamycin, Dilaudid [hydromorphone], and Keflex [cephalexin]    Review of Systems   Constitutional: Negative for chills, diaphoresis and fever. HENT: Negative for congestion, rhinorrhea and sore throat. Eyes: Negative for redness and visual disturbance. Respiratory: Negative for cough, chest tightness, shortness of breath and wheezing. Cardiovascular: Positive for chest pain. Negative for palpitations. Gastrointestinal: Negative for abdominal pain, blood in stool, diarrhea, nausea and vomiting. Endocrine: Negative for polydipsia and polyuria. Genitourinary: Negative for dysuria and hematuria. Musculoskeletal: Negative for arthralgias, myalgias and neck stiffness. Skin: Negative for rash. Neurological: Negative for dizziness, weakness and headaches. Psychiatric/Behavioral: Negative for confusion and sleep disturbance. The patient is not nervous/anxious. Vitals:    08/12/21 1150   BP: (!) 151/74   Pulse: 76   Resp: 16   Temp: 97.3 °F (36.3 °C)   SpO2: 98%   Weight: 127 kg (280 lb)   Height: 5' 4.5\" (1.638 m)            Physical Exam  Vitals and nursing note reviewed. Constitutional:       General: She is not in acute distress. Appearance: Normal appearance. She is well-developed. She is not toxic-appearing. HENT:      Head: Normocephalic and atraumatic. Eyes:      General: No scleral icterus. Right eye: No discharge. Left eye: No discharge. Conjunctiva/sclera: Conjunctivae normal.      Pupils: Pupils are equal, round, and reactive to light. Cardiovascular:      Rate and Rhythm: Normal rate and regular rhythm. Heart sounds: Normal heart sounds. Pulmonary:      Effort: Pulmonary effort is normal. No respiratory distress. Breath sounds: Normal breath sounds. No wheezing or rales. Chest:      Chest wall: No tenderness. Abdominal:      General: Bowel sounds are normal. There is no distension. Palpations: Abdomen is soft. Tenderness: There is no guarding or rebound. Musculoskeletal:         General: No tenderness. Normal range of motion. Cervical back: Normal range of motion. No rigidity. Lymphadenopathy:      Cervical: No cervical adenopathy. Skin:     General: Skin is warm and dry. Neurological:      General: No focal deficit present. Mental Status: She is alert and oriented to person, place, and time. Psychiatric:         Mood and Affect: Mood normal.         Behavior: Behavior normal.          MDM  Number of Diagnoses or Management Options  Diagnosis management comments: EKG review by ER doctor:  Normal sinus rhythm  No acute ischemic ST segment changes  No QTC prolongation  Rate of: 80    I will check her troponins as well as her other blood work. She is currently pain-free. ED Course as of Aug 12 1605   Thu Aug 12, 2021   1604 Patient's repeat troponin is negative.   I will discharge her home.    [AC]      ED Course User Index  [AC] Myrna Doe MD       Procedures

## 2021-08-12 NOTE — DISCHARGE INSTRUCTIONS
As we discussed, we did not find the exact cause of your chest symptoms today in the emergency department. Therefore, it is important for you to follow-up with your cardiologist or your primary care doctor for further evaluation. Please return with any fevers, worsening pain, difficulty breathing, increasing symptoms, or additional concerns.

## 2021-08-12 NOTE — ED NOTES
I have reviewed discharge instructions with the patient. The patient verbalized understanding. Patient left ED via Discharge Method: ambulatory to Home with self. Opportunity for questions and clarification provided. Patient given 0 scripts. To continue your aftercare when you leave the hospital, you may receive an automated call from our care team to check in on how you are doing. This is a free service and part of our promise to provide the best care and service to meet your aftercare needs.  If you have questions, or wish to unsubscribe from this service please call [phone]. Thank you for Choosing our [address] [place] [address] Emergency Department.

## 2021-08-12 NOTE — ED TRIAGE NOTES
Pt arrives from home with EMS c/o mid CP that started 3 hours ago. Non radiating. Hx of hypertension and compliant with meds. 324 aspirin and 1 nitro and pain free on arrival. bgl 165, 98.4 oral. 142/74, 80hr and sinus 12 lead. Presents with even and unlabored respirations.

## 2021-10-01 PROBLEM — R07.2 PRECORDIAL PAIN: Status: ACTIVE | Noted: 2021-10-01

## 2021-12-10 NOTE — DISCHARGE INSTRUCTIONS
Naye Wooten M.D.  [phone]    Instructions following Hernia Repair:    ACTIVITY:   Try to take a few short walks with help around the house later today. It is very important to take short walks to avoid blood clots and pneumonia.  You may be light-headed or sleepy from anesthesia, so be careful going up and down stairs. Avoid any activity that involves lifting/pushing more than 30 pounds until your followup appointment  DIET:   Stay on soft foods, as in the hospital, for another day or two until your GI tract has normalized.  Later  you may resume a more normal diet, depending on how you feel    PAIN:   You will be given a prescription for pain medication.  Try to take the pain medication with food, even a few crackers.  You may also use Tylenol, Motrin, Advil, or Aleve instead of the prescription pain medication. Do no take Tylenol and the prescription pain medication within 6 hours of each other.  URINARY RETENTION: If you are unable to empty your bladder within 6 hours after returning home, please go to your nearest Emergency Department or Urgent Care for urinary catheterization. WOUND CARE:   You may when you get home, unless instructed otherwise.  It is not uncommon for the incisions to ooze or drain blood-tinged fluid. You may remove the clear dressing on the fifth postoperative day.  Incisions will sometimes develop redness around them, up to the size of a quarter, as well as a hard lumpy feel. If this redness continues to get larger, please call the office. FOLLOW UP:   Your follow-up appointment is usually made when your surgery is arranged. Please call the office if you are not sure of this appointment. Keep DAQUAN until onQ reservoir is empty then contact Naz Marrero in office next week to discuss removal; Dr. Arpit Knott will be out of town next week. CALL THE DOCTOR IF:   You have a temperature higher than 101.5° Fahrenheit for more than 6 hours.    You have severe nausea or vomiting.  You develop increasing redness or infection at the incision. Continue home medications as previously prescribed. Abdominal Hernia Repair: What to Expect at Home  Your Recovery  After surgery to repair your hernia, you are likely to have pain for a few days. You may also feel like you have the flu, and you may have a low fever and feel tired and nauseated. This is common. You should feel better after a few days and will probably feel much better in 7 days. For several weeks you may feel twinges or pulling in the hernia repair when you move. You may have some bruising around the area of your hernia repair. This is normal.  This care sheet gives you a general idea about how long it will take for you to recover. But each person recovers at a different pace. Follow the steps below to get better as quickly as possible. How can you care for yourself at home? Activity  ? · Rest when you feel tired. Getting enough sleep will help you recover. ? · Try to walk each day. Start by walking a little more than you did the day before. Bit by bit, increase the amount you walk. Walking boosts blood flow and helps prevent pneumonia and constipation. ? · If your doctor gives you an abdominal binder to wear, use it as directed. This is an elastic bandage that wraps around your belly and upper hips. It helps support your belly muscles after surgery. ? · Avoid strenuous activities, such as biking, jogging, weight lifting, or aerobic exercise, until your doctor says it is okay. ? · Avoid lifting anything that would make you strain. This may include heavy grocery bags and milk containers, a heavy briefcase or backpack, cat litter or dog food bags, a vacuum , or a child. ? · Ask your doctor when you can drive again. ? · Most people are able to return to work within 1 to 2 weeks after surgery.  But if your job requires that you to do heavy lifting or strenuous activity, you may need to take 4 to 6 weeks off from work. ? · You may shower 24 to 48 hours after surgery, if your doctor okays it. Pat the cut (incision) dry. Do not take a bath for the first 2 weeks, or until your doctor tells you it is okay. ? · Ask your doctor when it is okay for you to have sex. Diet  ? · You can eat your normal diet. If your stomach is upset, try bland, low-fat foods like plain rice, broiled chicken, toast, and yogurt. ? · Drink plenty of fluids (unless your doctor tells you not to). ? · You may notice that your bowel movements are not regular right after your surgery. This is common. Avoid constipation and straining with bowel movements. You may want to take a fiber supplement every day. If you have not had a bowel movement after a couple of days, ask your doctor about taking a mild laxative. Medicines  ? · Your doctor will tell you if and when you can restart your medicines. He or she will also give you instructions about taking any new medicines. ? · If you take blood thinners, such as warfarin (Coumadin), clopidogrel (Plavix), or aspirin, be sure to talk to your doctor. He or she will tell you if and when to start taking those medicines again. Make sure that you understand exactly what your doctor wants you to do. ? · Be safe with medicines. Take pain medicines exactly as directed. ¨ If the doctor gave you a prescription medicine for pain, take it as prescribed. ¨ If you are not taking a prescription pain medicine, ask your doctor if you can take an over-the-counter medicine. ? · If your doctor prescribed antibiotics, take them as directed. Do not stop taking them just because you feel better. You need to take the full course of antibiotics. ? · If you think your pain medicine is making you sick to your stomach:  ¨ Take your medicine after meals (unless your doctor has told you not to). ¨ Ask your doctor for a different pain medicine. Incision care  ?  · If you have strips of tape on the cut (incision) the doctor made, leave the tape on for a week or until it falls off. Or follow your doctor's instructions for removing the tape. ? · If you have staples closing the cut, you will need to visit your doctor in 1 to 2 weeks to have them removed. ? · Wash the area daily with warm, soapy water, and pat it dry. Don't use hydrogen peroxide or alcohol, which can slow healing. You may cover the area with a gauze bandage if it weeps or rubs against clothing. Change the bandage every day. Other instructions  ? · Hold a pillow over your incision when you cough or take deep breaths. This will support your belly and decrease your pain. ? · Do breathing exercises at home as instructed by your doctor. This will help prevent pneumonia. ? · If you had laparoscopic surgery, you may also have pain in your left shoulder. The pain usually lasts about a day or two. Follow-up care is a key part of your treatment and safety. Be sure to make and go to all appointments, and call your doctor if you are having problems. It's also a good idea to know your test results and keep a list of the medicines you take. When should you call for help? Call 911 anytime you think you may need emergency care. For example, call if:  ? · You passed out (lost consciousness). ? · You are short of breath. ?Call your doctor now or seek immediate medical care if:  ? · You are sick to your stomach and cannot drink fluids. ? · You have signs of a blood clot in your leg (called a deep vein thrombosis), such as:  ¨ Pain in your calf, back of the knee, thigh, or groin. ¨ Redness and swelling in your leg or groin. ? · You have signs of infection, such as:  ¨ Increased pain, swelling, warmth, or redness. ¨ Red streaks leading from the incision. ¨ Pus draining from the incision. ¨ A fever. ? · You cannot pass stools or gas. ? · You have pain that does not get better after you take pain medicine.    ? · You have loose stitches, or your incision comes open. ? · Bright red blood has soaked through the bandage over your incision. ? Watch closely for changes in your health, and be sure to contact your doctor if you have any problems. Where can you learn more? Go to http://dana-justo.info/. Enter B577 in the search box to learn more about \"Abdominal Hernia Repair: What to Expect at Home. \"  Current as of: May 12, 2017  Content Version: 11.4  © 1370-5983 [employer]. Care instructions adapted under license by [employer] (which disclaims liability or warranty for this information). If you have questions about a medical condition or this instruction, always ask your healthcare professional. [place] [address] any warranty or liability for your use of this information. DISCHARGE SUMMARY from Nurse    PATIENT INSTRUCTIONS:    After general anesthesia or intravenous sedation, for 24 hours or while taking prescription Narcotics:  · Limit your activities  · Do not drive and operate hazardous machinery  · Do not make important personal or business decisions  · Do  not drink alcoholic beverages  · If you have not urinated within 8 hours after discharge, please contact your surgeon on call. Report the following to your surgeon:  · Excessive pain, swelling, redness or odor of or around the surgical area  · Temperature over 100.5  · Nausea and vomiting lasting longer than 4 hours or if unable to take medications  · Any signs of decreased circulation or nerve impairment to extremity: change in color, persistent  numbness, tingling, coldness or increase pain  · Any questions    What to do at Home:  Recommended activity: Activity as tolerated and No driving while on analgesics. If you experience any of the following symptoms   ? · You are sick to your stomach and cannot drink fluids.    ? · You have signs of a blood clot in your leg (called a deep vein thrombosis), such as:  ¨ Pain in your calf, back of the knee, thigh, or groin. ¨ Redness and swelling in your leg or groin. ? · You have signs of infection, such as:  ¨ Increased pain, swelling, warmth, or redness. ¨ Red streaks leading from the incision. ¨ Pus draining from the incision. ¨ A fever. ? · You cannot pass stools or gas. ? · You have pain that does not get better after you take pain medicine. ? · You have loose stitches, or your incision comes open. ? · Bright red blood has soaked through the bandage over your incision. ?  , please follow up with Dr. Kelli Goyal. *  Please give a list of your current medications to your Primary Care Provider. *  Please update this list whenever your medications are discontinued, doses are      changed, or new medications (including over-the-counter products) are added. *  Please carry medication information at all times in case of emergency situations. These are general instructions for a healthy lifestyle:    No smoking/ No tobacco products/ Avoid exposure to second hand smoke  Surgeon General's Warning:  Quitting smoking now greatly reduces serious risk to your health. Obesity, smoking, and sedentary lifestyle greatly increases your risk for illness    A healthy diet, regular physical exercise & weight monitoring are important for maintaining a healthy lifestyle    You may be retaining fluid if you have a history of heart failure or if you experience any of the following symptoms:  Weight gain of 3 pounds or more overnight or 5 pounds in a week, increased swelling in our hands or feet or shortness of breath while lying flat in bed. Please call your doctor as soon as you notice any of these symptoms; do not wait until your next office visit.     Recognize signs and symptoms of STROKE:    F-face looks uneven    A-arms unable to move or move unevenly    S-speech slurred or non-existent    T-time-call 911 as soon as signs and symptoms begin-DO NOT go       Back to bed or wait to see Detail Level: Zone if you get better-TIME IS BRAIN. Warning Signs of HEART ATTACK     Call 911 if you have these symptoms:   Chest discomfort. Most heart attacks involve discomfort in the center of the chest that lasts more than a few minutes, or that goes away and comes back. It can feel like uncomfortable pressure, squeezing, fullness, or pain.  Discomfort in other areas of the upper body. Symptoms can include pain or discomfort in one or both arms, the back, neck, jaw, or stomach.  Shortness of breath with or without chest discomfort.  Other signs may include breaking out in a cold sweat, nausea, or lightheadedness. Don't wait more than five minutes to call 911 - MINUTES MATTER! Fast action can save your life. Calling 911 is almost always the fastest way to get lifesaving treatment. Emergency Medical Services staff can begin treatment when they arrive -- up to an hour sooner than if someone gets to the hospital by car. The discharge information has been reviewed with the patient. The patient verbalized understanding. Discharge medications reviewed with the patient and appropriate educational materials and side effects teaching were provided.   ___________________________________________________________________________________________________________________________________

## 2022-03-18 PROBLEM — R07.2 PRECORDIAL PAIN: Status: ACTIVE | Noted: 2021-10-01

## 2022-03-19 PROBLEM — K43.0 RECURRENT INCISIONAL HERNIA WITH INCARCERATION: Status: ACTIVE | Noted: 2018-03-29

## 2022-03-19 PROBLEM — E78.2 MIXED HYPERLIPIDEMIA: Status: ACTIVE | Noted: 2018-10-16

## 2022-03-19 PROBLEM — Z98.890 S/P HERNIA REPAIR: Status: ACTIVE | Noted: 2018-04-10

## 2022-03-19 PROBLEM — E66.01 OBESITY, MORBID (HCC): Status: ACTIVE | Noted: 2018-03-08

## 2022-03-19 PROBLEM — Z87.19 S/P HERNIA REPAIR: Status: ACTIVE | Noted: 2018-04-10

## 2022-04-25 ENCOUNTER — TRANSCRIBE ORDER (OUTPATIENT)
Dept: SCHEDULING | Age: 77
End: 2022-04-25

## 2022-04-25 DIAGNOSIS — Z78.0 MENOPAUSE: Primary | ICD-10-CM

## 2022-04-26 ENCOUNTER — TRANSCRIBE ORDER (OUTPATIENT)
Dept: SCHEDULING | Age: 77
End: 2022-04-26

## 2022-04-26 DIAGNOSIS — Z12.31 VISIT FOR SCREENING MAMMOGRAM: Primary | ICD-10-CM

## 2022-06-13 ENCOUNTER — HOSPITAL ENCOUNTER (OUTPATIENT)
Dept: MAMMOGRAPHY | Age: 77
Discharge: HOME | End: 2022-06-16
Payer: MEDICARE

## 2022-06-13 DIAGNOSIS — Z12.31 ENCOUNTER FOR SCREENING MAMMOGRAM FOR MALIGNANT NEOPLASM OF BREAST: ICD-10-CM

## 2022-06-13 PROCEDURE — 77067 SCR MAMMO BI INCL CAD: CPT

## 2022-10-04 ENCOUNTER — OFFICE VISIT (OUTPATIENT)
Dept: ORTHOPEDIC SURGERY | Age: 77
End: 2022-10-04
Payer: MEDICARE

## 2022-10-04 DIAGNOSIS — Z96.611 PRESENCE OF RIGHT ARTIFICIAL SHOULDER JOINT: Primary | ICD-10-CM

## 2022-10-04 DIAGNOSIS — Z09 FOLLOW-UP EXAMINATION AFTER ORTHOPEDIC SURGERY: ICD-10-CM

## 2022-10-04 PROCEDURE — 3017F COLORECTAL CA SCREEN DOC REV: CPT | Performed by: ORTHOPAEDIC SURGERY

## 2022-10-04 PROCEDURE — G8417 CALC BMI ABV UP PARAM F/U: HCPCS | Performed by: ORTHOPAEDIC SURGERY

## 2022-10-04 PROCEDURE — 99212 OFFICE O/P EST SF 10 MIN: CPT | Performed by: ORTHOPAEDIC SURGERY

## 2022-10-04 PROCEDURE — G8427 DOCREV CUR MEDS BY ELIG CLIN: HCPCS | Performed by: ORTHOPAEDIC SURGERY

## 2022-10-04 PROCEDURE — 1036F TOBACCO NON-USER: CPT | Performed by: ORTHOPAEDIC SURGERY

## 2022-10-04 PROCEDURE — G8400 PT W/DXA NO RESULTS DOC: HCPCS | Performed by: ORTHOPAEDIC SURGERY

## 2022-10-04 PROCEDURE — 1090F PRES/ABSN URINE INCON ASSESS: CPT | Performed by: ORTHOPAEDIC SURGERY

## 2022-10-04 PROCEDURE — G8484 FLU IMMUNIZE NO ADMIN: HCPCS | Performed by: ORTHOPAEDIC SURGERY

## 2022-10-04 PROCEDURE — 1123F ACP DISCUSS/DSCN MKR DOCD: CPT | Performed by: ORTHOPAEDIC SURGERY

## 2022-10-04 NOTE — PROGRESS NOTES
Name: Milla Cornelius  YOB: 1945  Gender: female  MRN: [id number]        HPI: Milla Cornelius is a 77 y.o. female right-hand-dominant female 9 years status post reverse right total shoulder arthroplasty with a delta xtend prosthesis biceps tenodesis. She returns and she is doing well. ROS/Meds/PSH/PMH/FH/SH: A ten system review of systems was performed and is negative other than what is in the HPI. Tobacco:  reports that she has never smoked. She has never used smokeless tobacco.  There were no vitals taken for this visit. Physical Examination:  She is an awake alert pleasant female ambulating without difficulty    Her right shoulder is well-healed deltopectoral incision  Active and passive forward elevation right shoulder 0-1 60  ER to 45  IR to T10  Biceps has good cosmetic appearance  She is neurovascularly intact    Data Reviewed:        Category 2:       XR: AP Y axillary views right shoulder   Clinical Indication    ICD-10-CM    1. Presence of right artificial shoulder joint  Z96.611 XR SHOULDER RIGHT (MIN 2 VIEWS)      2. Follow-up examination after orthopedic surgery  Z09 XR SHOULDER RIGHT (MIN 2 VIEWS)         Report: AP Y axillary views right shoulder demonstrate reverse right total shoulder arthroplasty in excellent position    Impression: Status post reverse right total shoulder arthroplasty   Lane Cooper MD             Impression:   1. Presence of right artificial shoulder joint    2.  Follow-up examination after orthopedic surgery         Status post reverse right total shoulder arthroplasty with a delta xtend prosthesis biceps tenodesis 9 years  Rotator cuff tear arthropathy left shoulder  Status post arthroscopy left shoulder ASD, ADCR, debridement of SLAP tear mini open rotator cuff repair and biceps tenodesis  Cervical spondylosis multiple levels  Status post left total elbow arthroplasty  Status post previous left elbow operative procedure  Rule out periprosthetic loosening left elbow   previous left elbow fracture  Status post bilateral total knee arthroplasties  Hypertension on daily aspirin  Gout on allopurinol  Obesity  History of peripheral vascular disease    Plan:   I discussed the problem with the patient. The patient continues to enjoy an excellent result. I will recheck her back in 1 year with new AP, Y and axillary views right shoulder  2 This is a self limited or minor problem    Follow up: Return in about 1 year (around 10/4/2023).        Xray at next follow up:  AP Y axillary views right shoulder      Merlinda Common, MD

## 2022-10-27 ENCOUNTER — OFFICE VISIT (OUTPATIENT)
Dept: INTERNAL MEDICINE CLINIC | Facility: CLINIC | Age: 77
End: 2022-10-27
Payer: MEDICARE

## 2022-10-27 VITALS
HEIGHT: 64 IN | OXYGEN SATURATION: 100 % | SYSTOLIC BLOOD PRESSURE: 122 MMHG | WEIGHT: 258 LBS | BODY MASS INDEX: 44.05 KG/M2 | DIASTOLIC BLOOD PRESSURE: 64 MMHG | HEART RATE: 95 BPM

## 2022-10-27 DIAGNOSIS — M10.9 GOUT, UNSPECIFIED CAUSE, UNSPECIFIED CHRONICITY, UNSPECIFIED SITE: ICD-10-CM

## 2022-10-27 DIAGNOSIS — I10 PRIMARY HYPERTENSION: Primary | ICD-10-CM

## 2022-10-27 DIAGNOSIS — Z80.0 FAMILY HISTORY OF COLON CANCER IN MOTHER: ICD-10-CM

## 2022-10-27 DIAGNOSIS — Z23 NEED FOR INFLUENZA VACCINATION: ICD-10-CM

## 2022-10-27 DIAGNOSIS — E78.2 MIXED HYPERLIPIDEMIA: ICD-10-CM

## 2022-10-27 DIAGNOSIS — H35.89 MACULAR RPE MOTTLING: ICD-10-CM

## 2022-10-27 PROBLEM — I83.12 LIPODERMATOSCLEROSIS OF BOTH LOWER EXTREMITIES: Status: ACTIVE | Noted: 2022-04-25

## 2022-10-27 PROBLEM — G89.29 CHRONIC BILATERAL LOW BACK PAIN WITH LEFT-SIDED SCIATICA: Status: ACTIVE | Noted: 2022-04-25

## 2022-10-27 PROBLEM — I83.11 LIPODERMATOSCLEROSIS OF BOTH LOWER EXTREMITIES: Status: ACTIVE | Noted: 2022-04-25

## 2022-10-27 PROBLEM — I87.2 VENOUS STASIS DERMATITIS OF BOTH LOWER EXTREMITIES: Status: ACTIVE | Noted: 2022-04-25

## 2022-10-27 PROBLEM — E78.5 DYSLIPIDEMIA: Status: ACTIVE | Noted: 2022-04-25

## 2022-10-27 PROBLEM — M25.562 CHRONIC PAIN OF LEFT KNEE: Status: ACTIVE | Noted: 2022-04-25

## 2022-10-27 PROBLEM — I49.1 PREMATURE SUPRAVENTRICULAR BEATS: Status: ACTIVE | Noted: 2022-04-25

## 2022-10-27 PROBLEM — D50.9 IRON DEFICIENCY ANEMIA: Status: ACTIVE | Noted: 2022-04-25

## 2022-10-27 PROBLEM — M85.89 OSTEOPENIA OF MULTIPLE SITES: Status: ACTIVE | Noted: 2022-04-25

## 2022-10-27 PROBLEM — M79.3 LIPODERMATOSCLEROSIS OF BOTH LOWER EXTREMITIES: Status: ACTIVE | Noted: 2022-04-25

## 2022-10-27 PROBLEM — M54.42 CHRONIC BILATERAL LOW BACK PAIN WITH LEFT-SIDED SCIATICA: Status: ACTIVE | Noted: 2022-04-25

## 2022-10-27 PROBLEM — G89.29 CHRONIC PAIN OF LEFT KNEE: Status: ACTIVE | Noted: 2022-04-25

## 2022-10-27 PROCEDURE — 3017F COLORECTAL CA SCREEN DOC REV: CPT | Performed by: PHYSICIAN ASSISTANT

## 2022-10-27 PROCEDURE — 3078F DIAST BP <80 MM HG: CPT | Performed by: PHYSICIAN ASSISTANT

## 2022-10-27 PROCEDURE — 99215 OFFICE O/P EST HI 40 MIN: CPT | Performed by: PHYSICIAN ASSISTANT

## 2022-10-27 PROCEDURE — G8400 PT W/DXA NO RESULTS DOC: HCPCS | Performed by: PHYSICIAN ASSISTANT

## 2022-10-27 PROCEDURE — 1036F TOBACCO NON-USER: CPT | Performed by: PHYSICIAN ASSISTANT

## 2022-10-27 PROCEDURE — 1090F PRES/ABSN URINE INCON ASSESS: CPT | Performed by: PHYSICIAN ASSISTANT

## 2022-10-27 PROCEDURE — G0008 ADMIN INFLUENZA VIRUS VAC: HCPCS | Performed by: PHYSICIAN ASSISTANT

## 2022-10-27 PROCEDURE — G8427 DOCREV CUR MEDS BY ELIG CLIN: HCPCS | Performed by: PHYSICIAN ASSISTANT

## 2022-10-27 PROCEDURE — G8484 FLU IMMUNIZE NO ADMIN: HCPCS | Performed by: PHYSICIAN ASSISTANT

## 2022-10-27 PROCEDURE — 90694 VACC AIIV4 NO PRSRV 0.5ML IM: CPT | Performed by: PHYSICIAN ASSISTANT

## 2022-10-27 PROCEDURE — 3074F SYST BP LT 130 MM HG: CPT | Performed by: PHYSICIAN ASSISTANT

## 2022-10-27 PROCEDURE — G8417 CALC BMI ABV UP PARAM F/U: HCPCS | Performed by: PHYSICIAN ASSISTANT

## 2022-10-27 PROCEDURE — 1123F ACP DISCUSS/DSCN MKR DOCD: CPT | Performed by: PHYSICIAN ASSISTANT

## 2022-10-27 ASSESSMENT — ENCOUNTER SYMPTOMS
EYE PAIN: 0
COUGH: 0
NAUSEA: 0
CONSTIPATION: 0
ABDOMINAL PAIN: 0
COLOR CHANGE: 0
DIARRHEA: 0
WHEEZING: 0
VOICE CHANGE: 0
VOMITING: 0
SORE THROAT: 0
CHEST TIGHTNESS: 0
SHORTNESS OF BREATH: 0

## 2022-10-27 ASSESSMENT — PATIENT HEALTH QUESTIONNAIRE - PHQ9
SUM OF ALL RESPONSES TO PHQ QUESTIONS 1-9: 0
1. LITTLE INTEREST OR PLEASURE IN DOING THINGS: 0
SUM OF ALL RESPONSES TO PHQ QUESTIONS 1-9: 0
2. FEELING DOWN, DEPRESSED OR HOPELESS: 0
SUM OF ALL RESPONSES TO PHQ QUESTIONS 1-9: 0
SUM OF ALL RESPONSES TO PHQ9 QUESTIONS 1 & 2: 0
SUM OF ALL RESPONSES TO PHQ QUESTIONS 1-9: 0

## 2022-10-27 NOTE — PROGRESS NOTES
PROGRESS NOTE    Chief Complaint   Patient presents with    Established New Doctor    Hypertension        HPI  Hypertension  This is a chronic problem. The current episode started more than 1 year ago. The problem is unchanged. The problem is controlled. Pertinent negatives include no chest pain, headaches, neck pain, palpitations or shortness of breath. Risk factors for coronary artery disease include family history, dyslipidemia, obesity and post-menopausal state. The current treatment provides significant improvement. Hyperlipidemia  This is a chronic problem. The problem is controlled. Recent lipid tests were reviewed and are normal. Pertinent negatives include no chest pain or shortness of breath. The current treatment provides significant improvement of lipids. There are no compliance problems. Risk factors for coronary artery disease include dyslipidemia, family history and hypertension. Past Medical History, Past Surgical History, Family history, Social History, and Medications were all reviewed with the patient today and updated as necessary.        Current Outpatient Medications   Medication Sig Dispense Refill    Elderberry 500 MG CAPS Take by mouth      Multiple Vitamin (MULTIVITAMIN ADULT PO) Take by mouth      acetaminophen (TYLENOL) 650 MG extended release tablet Take 650 mg by mouth every 12 hours      allopurinol (ZYLOPRIM) 300 MG tablet TAKE ONE TABLET BY MOUTH ONE TIME DAILY      ascorbic acid (VITAMIN C) 500 MG tablet Take one tablet daily to increase iron absorption      vitamin D 25 MCG (1000 UT) CAPS Take 2,000 Units by mouth daily      Lactobacillus Rhamnosus, GG, ( PROBIOTIC DIGESTIVE CARE) CAPS Take 1 capsule by mouth 2 times daily      lisinopril (PRINIVIL;ZESTRIL) 20 MG tablet Take 20 mg by mouth 2 times daily      magnesium oxide (MAG-OX) 400 MG tablet Take 1-2 tabs by mouth at bedtime      omeprazole (PRILOSEC) 20 MG delayed release capsule Take 20 mg by mouth daily rosuvastatin (CRESTOR) 5 MG tablet TAKE ONE TABLET BY MOUTH EVERY NIGHT      albuterol sulfate  (90 Base) MCG/ACT inhaler Inhale 2 puffs into the lungs every 6 hours as needed       No current facility-administered medications for this visit. Allergies   Allergen Reactions    Hydromorphone Other (See Comments)     Difficulty breathing in recovery room, pt requests no additional use    Adhesive Tape Rash and Other (See Comments)     Only after prolonged periods of time.        Cephalexin Rash    Clindamycin Rash     Patient Active Problem List   Diagnosis    Precordial pain    Edema    Gout    Recurrent incisional hernia with incarceration    Hyperglycemia    Obesity    Other screening mammogram    Mixed hyperlipidemia    Hypertension    Menopause    Hyperlipidemia    IBS (irritable bowel syndrome)    Osteoarthritis    S/P hernia repair    Total knee replacement status    Obesity, morbid (HCC)    Osteoarthritis of left knee    Superior glenoid labrum lesion    Vitamin D deficiency    Anemia associated with acute blood loss    Chronic bilateral low back pain with left-sided sciatica    Chronic pain of left knee    Dyslipidemia    Iron deficiency anemia    Macular RPE mottling    Myopia with presbyopia of both eyes    Osteopenia of multiple sites    PCO (posterior capsular opacification), bilateral    Posterior vitreous detachment of right eye    Premature supraventricular beats    Venous stasis dermatitis of both lower extremities    Lipodermatosclerosis of both lower extremities     Past Medical History:   Diagnosis Date    Arthritis     osteo    Back pain     back problems    Edema     PER PT-- BETTER--     GERD (gastroesophageal reflux disease)     controlled with medication (denies hiatal hernia)     Gout 2/26/2013    Hyperlipidemia 2/26/2013    Hypertension     managed with meds    IBS (irritable bowel syndrome)     Mixed hyperlipidemia 10/16/2018    Morbid obesity ([facility] [address]     bmi=47    PONV (postoperative nausea and vomiting)     JOSÉ MIGUEL--- per pt-- was r/t/ dilaudid    Prediabetes     per pt-- no meds and does not check sqbs at home-- LAST HA1C 5.6-- 10/2019 PER PT    Sciatica     right worse than left    Sleep apnea     plans to be tested in future    Vitamin D deficiency 2/19/2015     Past Surgical History:   Procedure Laterality Date    [address] Bilateral     2014 - Dr. Galina Jurado Bilateral 2016    Dr. Radha Raymundo, [facility] N/A 11/12/2019    COLONOSCOPY/BMI 48 performed by Raydell Aschoff, MD at [address]    right inguinal hernia    HERNIA REPAIR  [phone]    umbilical hernia repair    HYSTERECTOMY (CERVIX STATUS UNKNOWN)  1986    ovaries remain    ORTHOPEDIC SURGERY Right 1982    right elbow surgery replacement x 2    ROTATOR CUFF REPAIR Left 2011    SHOULDER ARTHROPLASTY Right 2013    TONSILLECTOMY  1955    TOTAL KNEE ARTHROPLASTY Right 2012    TOTAL KNEE ARTHROPLASTY Left 2005     Family History   Problem Relation Age of Onset    Breast Cancer Neg Hx     Cancer Maternal Grandmother         colon    Cancer Father         prostate and colon    Hypertension Father     Heart Disease Mother         CHF and a Pacemaker    Stroke Mother     Cancer Mother         colon    Hypertension Mother      Social History     Tobacco Use    Smoking status: Never    Smokeless tobacco: Never   Substance Use Topics    Alcohol use: No         ROS  Review of Systems   Constitutional:  Negative for fatigue, fever and unexpected weight change. HENT:  Negative for congestion, ear pain, hearing loss, sore throat, tinnitus and voice change. Eyes:  Negative for pain and visual disturbance. Respiratory:  Negative for cough, chest tightness, shortness of breath and wheezing. Cardiovascular:  Negative for chest pain, palpitations and leg swelling.    Gastrointestinal:  Negative for abdominal pain, constipation, diarrhea, nausea and vomiting. Genitourinary:  Negative for dysuria, frequency, hematuria and urgency. Musculoskeletal:  Negative for arthralgias, gait problem, joint swelling and neck pain. Skin:  Negative for color change and rash. Neurological:  Negative for dizziness, syncope, numbness and headaches. Hematological:  Negative for adenopathy. Psychiatric/Behavioral:  Negative for decreased concentration, hallucinations, sleep disturbance and suicidal ideas. The patient is not nervous/anxious. OBJECTIVE:  /64 (Site: Right Upper Arm, Position: Sitting)   Pulse 95   Ht 5' 4\" (1.626 m)   Wt 258 lb (117 kg)   SpO2 100%   BMI 44.29 kg/m²      PHYSICAL EXAM  Physical Exam  Constitutional:       Appearance: Normal appearance. HENT:      Head: Normocephalic and atraumatic. Right Ear: External ear normal.      Left Ear: External ear normal.      Nose: Nose normal.   Eyes:      General:         Right eye: No discharge. Left eye: No discharge. Extraocular Movements: Extraocular movements intact. Conjunctiva/sclera: Conjunctivae normal.   Pulmonary:      Effort: Pulmonary effort is normal.   Musculoskeletal:         General: Normal range of motion. Cervical back: Normal range of motion and neck supple. Skin:     Findings: No erythema or rash. Neurological:      General: No focal deficit present. Mental Status: She is alert and oriented to person, place, and time. Psychiatric:         Mood and Affect: Mood normal.        Medical problems and test results were reviewed with the patient today. No results found for this or any previous visit (from the past 672 hour(s)). ASSESSMENT and PLAN  Nette Jefferson was seen today for established new doctor and hypertension.     Diagnoses and all orders for this visit:    Primary hypertension  Comments:  doing well on current medical plan    Mixed hyperlipidemia  Comments:  doing well cont medical plan    Macular RPE mottling  Comments:  pt seeing opthalmologist    Need for influenza vaccination  -     Influenza, FLUAD, (age 72 y+), IM, Preservative Free, 0.5 mL    Gout, unspecified cause, unspecified chronicity, unspecified site  Comments:  per pt isra on allopurinal    Family history of colon cancer in mother  Comments:  ptdue in in 2024 fro colonoscopy- [facility] records reviewed and meds reconciled    FOLLOW UP  Return in about 6 weeks (around 12/8/2022) for AWE and EXTENDED lab and med f/u 1 week later.

## 2022-12-15 ENCOUNTER — OFFICE VISIT (OUTPATIENT)
Dept: INTERNAL MEDICINE CLINIC | Facility: CLINIC | Age: 77
End: 2022-12-15
Payer: MEDICARE

## 2022-12-15 VITALS
SYSTOLIC BLOOD PRESSURE: 140 MMHG | DIASTOLIC BLOOD PRESSURE: 82 MMHG | HEIGHT: 64 IN | WEIGHT: 262 LBS | BODY MASS INDEX: 44.73 KG/M2

## 2022-12-15 DIAGNOSIS — Z12.31 ENCOUNTER FOR SCREENING MAMMOGRAM FOR BREAST CANCER: ICD-10-CM

## 2022-12-15 DIAGNOSIS — Z13.39 SCREENING FOR ALCOHOLISM: ICD-10-CM

## 2022-12-15 DIAGNOSIS — R73.01 IMPAIRED FASTING BLOOD SUGAR: ICD-10-CM

## 2022-12-15 DIAGNOSIS — Z12.11 COLON CANCER SCREENING: ICD-10-CM

## 2022-12-15 DIAGNOSIS — Z11.59 SCREENING FOR VIRAL DISEASE: ICD-10-CM

## 2022-12-15 DIAGNOSIS — Z13.31 SCREENING FOR DEPRESSION: ICD-10-CM

## 2022-12-15 DIAGNOSIS — E78.2 MIXED HYPERLIPIDEMIA: ICD-10-CM

## 2022-12-15 DIAGNOSIS — Z78.0 MENOPAUSE: ICD-10-CM

## 2022-12-15 DIAGNOSIS — Z23 ENCOUNTER FOR IMMUNIZATION: ICD-10-CM

## 2022-12-15 DIAGNOSIS — Z23 NEED FOR INFLUENZA VACCINATION: ICD-10-CM

## 2022-12-15 DIAGNOSIS — Z00.00 MEDICARE ANNUAL WELLNESS VISIT, SUBSEQUENT: Primary | ICD-10-CM

## 2022-12-15 DIAGNOSIS — E55.9 VITAMIN D DEFICIENCY: ICD-10-CM

## 2022-12-15 LAB
ALBUMIN SERPL-MCNC: 4.1 G/DL (ref 3.2–4.6)
ALBUMIN/GLOB SERPL: 1.3 (ref 0.4–1.6)
ALP SERPL-CCNC: 89 U/L (ref 50–136)
ALT SERPL-CCNC: 19 U/L (ref 12–65)
ANION GAP SERPL CALC-SCNC: 6 MMOL/L (ref 2–11)
AST SERPL-CCNC: 16 U/L (ref 15–37)
BASOPHILS # BLD: 0.1 K/UL (ref 0–0.2)
BASOPHILS NFR BLD: 1 % (ref 0–2)
BILIRUB SERPL-MCNC: 0.7 MG/DL (ref 0.2–1.1)
BUN SERPL-MCNC: 24 MG/DL (ref 8–23)
CALCIUM SERPL-MCNC: 9.7 MG/DL (ref 8.3–10.4)
CHLORIDE SERPL-SCNC: 107 MMOL/L (ref 101–110)
CHOLEST SERPL-MCNC: 155 MG/DL
CO2 SERPL-SCNC: 28 MMOL/L (ref 21–32)
CREAT SERPL-MCNC: 1 MG/DL (ref 0.6–1)
DIFFERENTIAL METHOD BLD: ABNORMAL
EOSINOPHIL # BLD: 0.1 K/UL (ref 0–0.8)
EOSINOPHIL NFR BLD: 1 % (ref 0.5–7.8)
ERYTHROCYTE [DISTWIDTH] IN BLOOD BY AUTOMATED COUNT: 14.6 % (ref 11.9–14.6)
EST. AVERAGE GLUCOSE BLD GHB EST-MCNC: 108 MG/DL
GLOBULIN SER CALC-MCNC: 3.1 G/DL (ref 2.8–4.5)
GLUCOSE SERPL-MCNC: 104 MG/DL (ref 65–100)
HBA1C MFR BLD: 5.4 % (ref 4.8–5.6)
HCT VFR BLD AUTO: 39.5 % (ref 35.8–46.3)
HDLC SERPL-MCNC: 45 MG/DL (ref 40–60)
HDLC SERPL: 3.4
HGB BLD-MCNC: 12.1 G/DL (ref 11.7–15.4)
IMM GRANULOCYTES # BLD AUTO: 0 K/UL (ref 0–0.5)
IMM GRANULOCYTES NFR BLD AUTO: 0 % (ref 0–5)
LDLC SERPL CALC-MCNC: 78.4 MG/DL
LYMPHOCYTES # BLD: 1.8 K/UL (ref 0.5–4.6)
LYMPHOCYTES NFR BLD: 27 % (ref 13–44)
MCH RBC QN AUTO: 30.9 PG (ref 26.1–32.9)
MCHC RBC AUTO-ENTMCNC: 30.6 G/DL (ref 31.4–35)
MCV RBC AUTO: 100.8 FL (ref 82–102)
MONOCYTES # BLD: 0.4 K/UL (ref 0.1–1.3)
MONOCYTES NFR BLD: 7 % (ref 4–12)
NEUTS SEG # BLD: 4.3 K/UL (ref 1.7–8.2)
NEUTS SEG NFR BLD: 64 % (ref 43–78)
NRBC # BLD: 0 K/UL (ref 0–0.2)
PLATELET # BLD AUTO: 201 K/UL (ref 150–450)
PMV BLD AUTO: 10.9 FL (ref 9.4–12.3)
POTASSIUM SERPL-SCNC: 5.1 MMOL/L (ref 3.5–5.1)
PROT SERPL-MCNC: 7.2 G/DL (ref 6.3–8.2)
RBC # BLD AUTO: 3.92 M/UL (ref 4.05–5.2)
SODIUM SERPL-SCNC: 141 MMOL/L (ref 133–143)
TRIGL SERPL-MCNC: 158 MG/DL (ref 35–150)
TSH, 3RD GENERATION: 2.35 UIU/ML (ref 0.36–3.74)
VLDLC SERPL CALC-MCNC: 31.6 MG/DL (ref 6–23)
WBC # BLD AUTO: 6.7 K/UL (ref 4.3–11.1)

## 2022-12-15 PROCEDURE — 3017F COLORECTAL CA SCREEN DOC REV: CPT | Performed by: PHYSICIAN ASSISTANT

## 2022-12-15 PROCEDURE — 3078F DIAST BP <80 MM HG: CPT | Performed by: PHYSICIAN ASSISTANT

## 2022-12-15 PROCEDURE — G8484 FLU IMMUNIZE NO ADMIN: HCPCS | Performed by: PHYSICIAN ASSISTANT

## 2022-12-15 PROCEDURE — 3074F SYST BP LT 130 MM HG: CPT | Performed by: PHYSICIAN ASSISTANT

## 2022-12-15 PROCEDURE — 1123F ACP DISCUSS/DSCN MKR DOCD: CPT | Performed by: PHYSICIAN ASSISTANT

## 2022-12-15 PROCEDURE — G0439 PPPS, SUBSEQ VISIT: HCPCS | Performed by: PHYSICIAN ASSISTANT

## 2022-12-15 ASSESSMENT — PATIENT HEALTH QUESTIONNAIRE - PHQ9
SUM OF ALL RESPONSES TO PHQ QUESTIONS 1-9: 0
1. LITTLE INTEREST OR PLEASURE IN DOING THINGS: 0
SUM OF ALL RESPONSES TO PHQ QUESTIONS 1-9: 0
SUM OF ALL RESPONSES TO PHQ9 QUESTIONS 1 & 2: 0
2. FEELING DOWN, DEPRESSED OR HOPELESS: 0
SUM OF ALL RESPONSES TO PHQ QUESTIONS 1-9: 0
SUM OF ALL RESPONSES TO PHQ QUESTIONS 1-9: 0

## 2022-12-15 ASSESSMENT — LIFESTYLE VARIABLES
HOW OFTEN DO YOU HAVE A DRINK CONTAINING ALCOHOL: NEVER
HOW MANY STANDARD DRINKS CONTAINING ALCOHOL DO YOU HAVE ON A TYPICAL DAY: PATIENT DOES NOT DRINK

## 2022-12-15 NOTE — PATIENT INSTRUCTIONS
Preventing Falls: Care Instructions  Overview     Getting around your home safely can be a challenge if you have injuries or health problems that make it easy for you to fall. Loose rugs and furniture in walkways are among the dangers for many older people who have problems walking or who have poor eyesight. People who have conditions such as arthritis, osteoporosis, or dementia also have to be careful not to fall. You can make your home safer with a few simple measures. Follow-up care is a key part of your treatment and safety. Be sure to make and go to all appointments, and call your doctor if you are having problems. It's also a good idea to know your test results and keep a list of the medicines you take. How can you care for yourself at home? Taking care of yourself  Exercise regularly to improve your strength, muscle tone, and balance. Walk if you can. Swimming may be a good choice if you cannot walk easily. Have your vision and hearing checked each year or any time you notice a change. If you have trouble seeing and hearing, you might not be able to avoid objects and could lose your balance. Know the side effects of the medicines you take. Ask your doctor or pharmacist whether the medicines you take can affect your balance. Sleeping pills or sedatives can affect your balance. Limit the amount of alcohol you drink. Alcohol can impair your balance and other senses. Ask your doctor whether calluses or corns on your feet need to be removed. If you wear loose-fitting shoes because of calluses or corns, you can lose your balance and fall. Talk to your doctor if you have numbness in your feet. You may get dizzy if you do not drink enough water. To prevent dehydration, drink plenty of fluids. Choose water and other clear liquids. If you have kidney, heart, or liver disease and have to limit fluids, talk with your doctor before you increase the amount of fluids you drink.   Preventing falls at home  Remove raised doorway thresholds, throw rugs, and clutter. Repair loose carpet or raised areas in the floor. Move furniture and electrical cords to keep them out of walking paths. Use nonskid floor wax, and wipe up spills right away, especially on ceramic tile floors. If you use a walker or cane, put rubber tips on it. If you use crutches, clean the bottoms of them regularly with an abrasive pad, such as steel wool. Keep your house well lit, especially stairways, porches, and outside walkways. Use night-lights in areas such as hallways and bathrooms. Add extra light switches or use remote switches (such as switches that go on or off when you clap your hands) to make it easier to turn lights on if you have to get up during the night. Install sturdy handrails on stairways. Move items in your cabinets so that the things you use a lot are on the lower shelves (about waist level). Keep a cordless phone and a flashlight with new batteries by your bed. If possible, put a phone in each of the main rooms of your house, or carry a cell phone in case you fall and cannot reach a phone. Or, you can wear a device around your neck or wrist. You push a button that sends a signal for help. Wear low-heeled shoes that fit well and give your feet good support. Use footwear with nonskid soles. Check the heels and soles of your shoes for wear. Repair or replace worn heels or soles. Do not wear socks without shoes on smooth floors, such as wood. Walk on the grass when the sidewalks are slippery. If you live in an area that gets snow and ice in the winter, sprinkle salt on slippery steps and sidewalks. Or ask a family member or friend to do this for you. Preventing falls in the bath  Install grab bars and nonskid mats inside and outside your shower or tub and near the toilet and sinks. Use shower chairs and bath benches. Use a hand-held shower head that will allow you to sit while showering.   Get into a tub or shower by putting the weaker leg in first. Get out of a tub or shower with your strong side first.  Repair loose toilet seats and consider installing a raised toilet seat to make getting on and off the toilet easier. Keep your bathroom door unlocked while you are in the shower. Where can you learn more? Go to http://www.daniels.com/ and enter G117 to learn more about \"Preventing Falls: Care Instructions. \"  Current as of: May 4, 2022               Content Version: 13.5  © 1067-0740 Healthwise, Incorporated. Care instructions adapted under license by [facility] ([facility]). If you have questions about a medical condition or this instruction, always ask your healthcare professional. [address] any warranty or liability for your use of this information. Hearing Loss: Care Instructions  Overview     Hearing loss is a sudden or slow decrease in how well you hear. It can range from mild to severe. Permanent hearing loss can occur with aging. It also can happen when you are exposed long-term to loud noise. Examples include listening to loud music, riding motorcycles, or being around other loud machines. Hearing loss can affect your work and home life. It can make you feel lonely or depressed. You may feel that you have lost your independence. But hearing aids and other devices can help you hear better and feel connected to others. Follow-up care is a key part of your treatment and safety. Be sure to make and go to all appointments, and call your doctor if you are having problems. It's also a good idea to know your test results and keep a list of the medicines you take. How can you care for yourself at home? Avoid loud noises whenever possible. This helps keep your hearing from getting worse. Always wear hearing protection around loud noises. Wear a hearing aid as directed. See a professional who can help you pick a hearing aid that fits you. Have hearing tests as your doctor suggests. They can show whether your hearing has changed. Your hearing aid may need to be adjusted. Use other devices as needed. These may include:  Telephone amplifiers and hearing aids that can connect to a television, stereo, radio, or microphone. Devices that use lights or vibrations. These alert you to the doorbell, a ringing telephone, or a baby monitor. Television closed-captioning. This shows the words at the bottom of the screen. Most new TVs can do this. TTY (text telephone). This lets you type messages back and forth on the telephone instead of talking or listening. These devices are also called TDD. When messages are typed on the keyboard, they are sent over the phone line to a receiving TTY. The message is shown on a monitor. Use text messaging, social media, and email if it is hard for you to communicate by telephone. Try to learn a listening technique called speechreading. It is not lipreading. You pay attention to people's gestures, expressions, posture, and tone of voice. These clues can help you understand what a person is saying. Face the person you are talking to, and have them face you. Make sure the lighting is good. You need to see the other person's face clearly. Think about counseling if you need help to adjust to your hearing loss. When should you call for help? Watch closely for changes in your health, and be sure to contact your doctor if:    You think your hearing is getting worse.     You have new symptoms, such as dizziness or nausea. Where can you learn more? Go to http://www.Questli.com/ and enter R798 to learn more about \"Hearing Loss: Care Instructions. \"  Current as of: May 4, 2022               Content Version: 13.5  © 7569-6508 Healthwise, Incorporated. Care instructions adapted under license by [facility] ([facility]).  If you have questions about a medical condition or this instruction, always ask your healthcare professional. [place]inc[place] any warranty or liability for your use of this information. Learning About Activities of Daily Living  What are activities of daily living? Activities of daily living (ADLs) are the basic self-care tasks you do every day. As you age, and if you have health problems, you may find that it's harder to do these things for yourself. That's when you may need some help. Your doctor uses ADLs to measure how much help you need. Knowing what you can and can't do for yourself is an important first step to getting help. And when you have the help you need, you can stay as independent as possible. Your doctor will want to know if you are able to do tasks such as: Take a bath or shower without help. Go to the bathroom by yourself. Dress and undress without help. Shave, comb your hair, and brush teeth on your own. Get in and out of bed or a chair without help. Feed yourself without help. If you are having trouble doing basic self-care tasks, talk with your doctor. You may want to bring a caregiver or family member who can help the doctor understand your needs and abilities. How will a doctor assess your ADLs? Asking about ADLs is part of a routine health checkup your doctor will likely do as you age. Your health check might be done in a doctor's office, in your home, or at a hospital. The goal is to find out if you are having any problems that could make your health problems worse or that make it unsafe for you to be on your own. To measure your ADLs, your doctor will ask how hard it is for you to do routine tasks. He or she may also want to know if you have changed the way you do a task because of a health problem. He or she may watch how you:  Walk back and forth. Keep your balance while you stand or walk. Move from sitting to standing or from a bed to a chair. Button or unbutton a shirt or sweater. Remove and put on your shoes.   It's normal to feel a little worried or anxious if you find you can't do all the things you used to be able to do. Talking with your doctor about ADLs isn't a test that you either pass or fail. It's just a way to get more information about your health and safety. Follow-up care is a key part of your treatment and safety. Be sure to make and go to all appointments, and call your doctor if you are having problems. It's also a good idea to know your test results and keep a list of the medicines you take. Current as of: October 6, 2021               Content Version: 13.5  © 2006-2022 Healthwise, [employer]. Care instructions adapted under license by [facility] [address] If you have questions about a medical condition or this instruction, always ask your healthcare professional. [place] [address] any warranty or liability for your use of this information. Advance Directives: Care Instructions  Overview  An advance directive is a legal way to state your wishes at the end of your life. It tells your family and your doctor what to do if you can't say what you want. There are two main types of advance directives. You can change them any time your wishes change. Living will. This form tells your family and your doctor your wishes about life support and other treatment. The form is also called a declaration. Medical power of . This form lets you name a person to make treatment decisions for you when you can't speak for yourself. This person is called a health care agent (health care proxy, health care surrogate). The form is also called a durable power of  for health care. If you do not have an advance directive, decisions about your medical care may be made by a family member, or by a doctor or a  who doesn't know you. It may help to think of an advance directive as a gift to the people who care for you. If you have one, they won't have to make tough decisions by themselves.   For more information, including forms for your state, see the [employer] website (www.caringinfo.org/planning/advance-directives/). Follow-up care is a key part of your treatment and safety. Be sure to make and go to all appointments, and call your doctor if you are having problems. It's also a good idea to know your test results and keep a list of the medicines you take. What should you include in an advance directive? Many states have a unique advance directive form. (It may ask you to address specific issues.) Or you might use a universal form that's approved by many states. If your form doesn't tell you what to address, it may be hard to know what to include in your advance directive. Use the questions below to help you get started. Who do you want to make decisions about your medical care if you are not able to? What life-support measures do you want if you have a serious illness that gets worse over time or can't be cured? What are you most afraid of that might happen? (Maybe you're afraid of having pain, losing your independence, or being kept alive by machines.)  Where would you prefer to die? (Your home? A hospital? A nursing home?)  Do you want to donate your organs when you die? Do you want certain Anabaptism practices performed before you die? When should you call for help? Be sure to contact your doctor if you have any questions. Where can you learn more? Go to http://www.daniels.com/ and enter R264 to learn more about \"Advance Directives: Care Instructions. \"  Current as of: June 16, 2022               Content Version: 13.5  © 3234-6659 Healthwise, Incorporated. Care instructions adapted under license by [facility] ([facility]). If you have questions about a medical condition or this instruction, always ask your healthcare professional. [place] [address] any warranty or liability for your use of this information.       Personalized Preventive Plan for Kwame Huang - 12/15/2022  Medicare offers a range of preventive health benefits. Some of the tests and screenings are paid in full while other may be subject to a deductible, co-insurance, and/or copay. Some of these benefits include a comprehensive review of your medical history including lifestyle, illnesses that may run in your family, and various assessments and screenings as appropriate. After reviewing your medical record and screening and assessments performed today your provider may have ordered immunizations, labs, imaging, and/or referrals for you. A list of these orders (if applicable) as well as your Preventive Care list are included within your After Visit Summary for your review. Other Preventive Recommendations:    A preventive eye exam performed by an eye specialist is recommended every 1-2 years to screen for glaucoma; cataracts, macular degeneration, and other eye disorders. A preventive dental visit is recommended every 6 months. Try to get at least 150 minutes of exercise per week or 10,000 steps per day on a pedometer . Order or download the FREE \"Exercise & Physical Activity: Your Everyday Guide\" from [employer]. Call 0-989.633.3298 or search [employer] online. You need 0795-1711 mg of calcium and 4380-1284 IU of vitamin D per day. It is possible to meet your calcium requirement with diet alone, but a vitamin D supplement is usually necessary to meet this goal.  When exposed to the sun, use a sunscreen that protects against both UVA and UVB radiation with an SPF of 30 or greater. Reapply every 2 to 3 hours or after sweating, drying off with a towel, or swimming. Always wear a seat belt when traveling in a car. Always wear a helmet when riding a bicycle or motorcycle.

## 2022-12-15 NOTE — PROGRESS NOTES
Medicare Annual Wellness Visit    Kyle Russell is here for Medicare AWV (Pt here for AWE part one and she is fasting today )    Assessment & Plan   Medicare annual wellness visit, subsequent  Screening for depression  Screening for alcoholism  Encounter for immunization  Need for influenza vaccination  Screening for viral disease  Colon cancer screening  Encounter for screening mammogram for breast cancer  Menopause  Vitamin D deficiency  -     Vitamin D 25 Hydroxy; Future  Impaired fasting blood sugar  -     CBC with Auto Differential; Future  -     Comprehensive Metabolic Panel; Future  -     Hemoglobin A1C; Future  Mixed hyperlipidemia  -     Lipid Panel; Future  -     TSH; Future      Recommendations for Preventive Services Due: see orders and patient instructions/AVS.  Recommended screening schedule for the next 5-10 years is provided to the patient in written form: see Patient Instructions/AVS.     Return for Medicare Annual Wellness Visit in 1 year. Subjective       Patient's complete Health Risk Assessment and screening values have been reviewed and are found in Flowsheets. The following problems were reviewed today and where indicated follow up appointments were made and/or referrals ordered.     Positive Risk Factor Screenings with Interventions:    Fall Risk:  Do you feel unsteady or are you worried about falling? : (!) yes  2 or more falls in past year?: no  Fall with injury in past year?: no     Interventions:    See AVS for additional education material  See A/P for plan and any pertinent orders              Weight and Activity:  Physical Activity: Insufficiently Active    Days of Exercise per Week: 1 day    Minutes of Exercise per Session: 10 min     On average, how many days per week do you engage in moderate to strenuous exercise (like a brisk walk)?: 1 day  Have you lost any weight without trying in the past 3 months?: No  Body mass index: (!) 44.97    Obesity Interventions:  See AVS for additional education material  See A/P for plan and any pertinent orders             Safety:  Do all of your stairways have a railing or banister?: (!) No  Interventions:  See AVS for additional education material  See A/P for plan and any pertinent orders    ADL's:   Patient reports needing help with:  Select all that apply: (!) Shopping  Interventions:  See AVS for additional education material  See A/P for plan and any pertinent orders                    Objective   Vitals:    12/15/22 0922   BP: (!) 140/82   Weight: 262 lb (118.8 kg)   Height: 5' 4\" (1.626 m)      Body mass index is 44.97 kg/m². Allergies   Allergen Reactions    Hydromorphone Other (See Comments)     Difficulty breathing in recovery room, pt requests no additional use    Adhesive Tape Rash and Other (See Comments)     Only after prolonged periods of time. Cephalexin Rash    Clindamycin Rash     Prior to Visit Medications    Medication Sig Taking?  Authorizing Provider   Elderberry 500 MG CAPS Take 500 mg by mouth daily Yes Historical Provider, MD   Multiple Vitamin (MULTIVITAMIN ADULT PO) Take 1 tablet by mouth daily Yes Historical Provider, MD   acetaminophen (TYLENOL) 650 MG extended release tablet Take 650 mg by mouth 2 times daily as needed Yes Ar Automatic Reconciliation   allopurinol (ZYLOPRIM) 300 MG tablet Take 300 mg by mouth daily Yes Ar Automatic Reconciliation   ascorbic acid (VITAMIN C) 500 MG tablet Take 500 mg by mouth daily Yes Ar Automatic Reconciliation   vitamin D 25 MCG (1000 UT) CAPS Take 2,000 Units by mouth daily Yes Ar Automatic Reconciliation   lisinopril (PRINIVIL;ZESTRIL) 20 MG tablet Take 20 mg by mouth 2 times daily Yes Ar Automatic Reconciliation   magnesium oxide (MAG-OX) 400 MG tablet Take 400-800 mg by mouth at bedtime Take 1-2 tabs by mouth at bedtime Yes Ar Automatic Reconciliation   omeprazole (PRILOSEC) 20 MG delayed release capsule Take 20 mg by mouth daily Yes Ar Automatic Reconciliation rosuvastatin (CRESTOR) 5 MG tablet Take 5 mg by mouth every evening Yes Ar Automatic Reconciliation       CareTeam (Including outside providers/suppliers regularly involved in providing care):   Patient Care Team:  Kate Watson as PCP - General (Physician Assistant)  TRINITY Watson as PCP - [address] [place] [address] Dr Brock Provider     Reviewed and updated this visit:  Tobacco  Allergies  Meds  Problems  Med Hx  Surg Hx  Soc Hx  Fam Hx

## 2022-12-22 ENCOUNTER — OFFICE VISIT (OUTPATIENT)
Dept: INTERNAL MEDICINE CLINIC | Facility: CLINIC | Age: 77
End: 2022-12-22
Payer: MEDICARE

## 2022-12-22 VITALS
DIASTOLIC BLOOD PRESSURE: 84 MMHG | HEIGHT: 64 IN | BODY MASS INDEX: 44.3 KG/M2 | SYSTOLIC BLOOD PRESSURE: 134 MMHG | WEIGHT: 259.5 LBS

## 2022-12-22 DIAGNOSIS — M10.9 GOUT, UNSPECIFIED CAUSE, UNSPECIFIED CHRONICITY, UNSPECIFIED SITE: ICD-10-CM

## 2022-12-22 DIAGNOSIS — E66.01 OBESITY, CLASS III, BMI 40-49.9 (MORBID OBESITY) (HCC): ICD-10-CM

## 2022-12-22 DIAGNOSIS — R73.01 IMPAIRED FASTING BLOOD SUGAR: ICD-10-CM

## 2022-12-22 DIAGNOSIS — I10 PRIMARY HYPERTENSION: ICD-10-CM

## 2022-12-22 DIAGNOSIS — E55.9 VITAMIN D DEFICIENCY: ICD-10-CM

## 2022-12-22 DIAGNOSIS — K21.9 GASTROESOPHAGEAL REFLUX DISEASE WITHOUT ESOPHAGITIS: ICD-10-CM

## 2022-12-22 DIAGNOSIS — E78.2 MIXED HYPERLIPIDEMIA: Primary | ICD-10-CM

## 2022-12-22 PROCEDURE — 99215 OFFICE O/P EST HI 40 MIN: CPT | Performed by: PHYSICIAN ASSISTANT

## 2022-12-22 PROCEDURE — 3078F DIAST BP <80 MM HG: CPT | Performed by: PHYSICIAN ASSISTANT

## 2022-12-22 PROCEDURE — 3074F SYST BP LT 130 MM HG: CPT | Performed by: PHYSICIAN ASSISTANT

## 2022-12-22 PROCEDURE — 1090F PRES/ABSN URINE INCON ASSESS: CPT | Performed by: PHYSICIAN ASSISTANT

## 2022-12-22 PROCEDURE — G8400 PT W/DXA NO RESULTS DOC: HCPCS | Performed by: PHYSICIAN ASSISTANT

## 2022-12-22 PROCEDURE — G8417 CALC BMI ABV UP PARAM F/U: HCPCS | Performed by: PHYSICIAN ASSISTANT

## 2022-12-22 PROCEDURE — G8427 DOCREV CUR MEDS BY ELIG CLIN: HCPCS | Performed by: PHYSICIAN ASSISTANT

## 2022-12-22 PROCEDURE — G8484 FLU IMMUNIZE NO ADMIN: HCPCS | Performed by: PHYSICIAN ASSISTANT

## 2022-12-22 PROCEDURE — 1123F ACP DISCUSS/DSCN MKR DOCD: CPT | Performed by: PHYSICIAN ASSISTANT

## 2022-12-22 PROCEDURE — 1036F TOBACCO NON-USER: CPT | Performed by: PHYSICIAN ASSISTANT

## 2022-12-22 RX ORDER — OMEPRAZOLE 20 MG/1
20 CAPSULE, DELAYED RELEASE ORAL DAILY
Qty: 90 CAPSULE | Refills: 1 | Status: SHIPPED | OUTPATIENT
Start: 2022-12-22

## 2022-12-22 RX ORDER — ROSUVASTATIN CALCIUM 5 MG/1
5 TABLET, COATED ORAL EVERY EVENING
Qty: 90 TABLET | Refills: 1 | Status: SHIPPED | OUTPATIENT
Start: 2022-12-22

## 2022-12-22 RX ORDER — ACETAMINOPHEN 160 MG
2000 TABLET,DISINTEGRATING ORAL DAILY
COMMUNITY
End: 2022-12-22

## 2022-12-22 RX ORDER — LISINOPRIL 20 MG/1
20 TABLET ORAL 2 TIMES DAILY
Qty: 180 TABLET | Refills: 1 | Status: SHIPPED | OUTPATIENT
Start: 2022-12-22

## 2022-12-22 RX ORDER — ALLOPURINOL 300 MG/1
300 TABLET ORAL DAILY
Qty: 90 TABLET | Refills: 1 | Status: SHIPPED | OUTPATIENT
Start: 2022-12-22

## 2022-12-22 ASSESSMENT — ENCOUNTER SYMPTOMS
CONSTIPATION: 0
WHEEZING: 0
NAUSEA: 0
COUGH: 0
EYE PAIN: 0
SHORTNESS OF BREATH: 0
COLOR CHANGE: 0
CHEST TIGHTNESS: 0
ABDOMINAL PAIN: 0
DIARRHEA: 0
SORE THROAT: 0
VOICE CHANGE: 0
VOMITING: 0

## 2022-12-22 NOTE — PROGRESS NOTES
PROGRESS NOTE    Chief Complaint   Patient presents with    Cholesterol Problem     Pt here for AWE part two     Hypertension    Results     Labs were done 12/15/22        HPI  Hypertension  This is a chronic problem. The current episode started more than 1 year ago. The problem is unchanged. The problem is controlled. Pertinent negatives include no chest pain, headaches, neck pain, palpitations or shortness of breath. Risk factors for coronary artery disease include family history, obesity and post-menopausal state. Past treatments include ACE inhibitors. The current treatment provides significant improvement. There are no compliance problems. Hyperlipidemia  This is a chronic problem. The problem is controlled. Recent lipid tests were reviewed and are normal. Pertinent negatives include no chest pain or shortness of breath. Current antihyperlipidemic treatment includes statins. The current treatment provides significant improvement of lipids. There are no compliance problems. Past Medical History, Past Surgical History, Family history, Social History, and Medications were all reviewed with the patient today and updated as necessary.        Current Outpatient Medications   Medication Sig Dispense Refill    Cholecalciferol (VITAMIN D3) 50 MCG (2000 UT) CAPS Take 2,000 Units by mouth daily      omeprazole (PRILOSEC) 20 MG delayed release capsule Take 1 capsule by mouth daily 90 capsule 1    lisinopril (PRINIVIL;ZESTRIL) 20 MG tablet Take 1 tablet by mouth 2 times daily 180 tablet 1    allopurinol (ZYLOPRIM) 300 MG tablet Take 1 tablet by mouth daily 90 tablet 1    rosuvastatin (CRESTOR) 5 MG tablet Take 1 tablet by mouth every evening 90 tablet 1    Elderberry 500 MG CAPS Take 500 mg by mouth daily      Multiple Vitamin (MULTIVITAMIN ADULT PO) Take 1 tablet by mouth daily      acetaminophen (TYLENOL) 650 MG extended release tablet Take 650 mg by mouth 2 times daily as needed      ascorbic acid (VITAMIN C) 500 MG tablet Take 500 mg by mouth daily      magnesium oxide (MAG-OX) 400 MG tablet Take 400-800 mg by mouth at bedtime Take 1-2 tabs by mouth at bedtime      vitamin D 25 MCG (1000 UT) CAPS Take 2,000 Units by mouth daily (Patient not taking: Reported on 12/22/2022)       No current facility-administered medications for this visit. Allergies   Allergen Reactions    Hydromorphone Other (See Comments)     Difficulty breathing in recovery room, pt requests no additional use    Adhesive Tape Rash and Other (See Comments)     Only after prolonged periods of time.        Cephalexin Rash    Clindamycin Rash     Patient Active Problem List   Diagnosis    Precordial pain    Edema    Gout    Recurrent incisional hernia with incarceration    Hyperglycemia    Obesity    Other screening mammogram    Mixed hyperlipidemia    Hypertension    Menopause    Hyperlipidemia    IBS (irritable bowel syndrome)    Osteoarthritis    S/P hernia repair    Total knee replacement status    Obesity, morbid (HCC)    Osteoarthritis of left knee    Superior glenoid labrum lesion    Vitamin D deficiency    Anemia associated with acute blood loss    Chronic bilateral low back pain with left-sided sciatica    Chronic pain of left knee    Dyslipidemia    Iron deficiency anemia    Macular RPE mottling    Myopia with presbyopia of both eyes    Osteopenia of multiple sites    PCO (posterior capsular opacification), bilateral    Posterior vitreous detachment of right eye    Premature supraventricular beats    Venous stasis dermatitis of both lower extremities    Lipodermatosclerosis of both lower extremities     Past Medical History:   Diagnosis Date    Arthritis     osteo    Back pain     back problems    Edema     PER PT-- BETTER--     GERD (gastroesophageal reflux disease)     controlled with medication (denies hiatal hernia)     Gout 2/26/2013    Hyperlipidemia 2/26/2013    Hypertension     managed with meds    IBS (irritable bowel syndrome)     Mixed hyperlipidemia 10/16/2018    Morbid obesity (HCC)     bmi=47    PONV (postoperative nausea and vomiting)     JOSÉ MIGUEL--- per pt-- was r/t/ dilaudid    Prediabetes     per pt-- no meds and does not check sqbs at home-- LAST HA1C 5.6-- 10/2019 PER PT    Sciatica     right worse than left    Sleep apnea     plans to be tested in future    Vitamin D deficiency 2/19/2015     Past Surgical History:   Procedure Laterality Date    [address] Bilateral     2014 - Dr. Rosendo Xavier Bilateral 2016    Dr. Abigail Colón, [facility] N/A 11/12/2019    COLONOSCOPY/BMI 48 performed by Yordan Llamas MD at [address]    right inguinal hernia    HERNIA REPAIR  [id number]    umbilical hernia repair    HYSTERECTOMY (CERVIX STATUS UNKNOWN)  1986    ovaries remain    ORTHOPEDIC SURGERY Right 1982    right elbow surgery replacement x 2    ROTATOR CUFF REPAIR Left 2011    SHOULDER ARTHROPLASTY Right 2013    TONSILLECTOMY  1955    TOTAL KNEE ARTHROPLASTY Right 2012    TOTAL KNEE ARTHROPLASTY Left 2005     Family History   Problem Relation Age of Onset    Breast Cancer Neg Hx     Cancer Maternal Grandmother         colon    Cancer Father         prostate and colon    Hypertension Father     Heart Disease Mother         CHF and a Pacemaker    Stroke Mother     Cancer Mother         colon    Hypertension Mother      Social History     Tobacco Use    Smoking status: Never    Smokeless tobacco: Never   Substance Use Topics    Alcohol use: No         ROS  Review of Systems   Constitutional:  Negative for fatigue, fever and unexpected weight change. HENT:  Negative for congestion, ear pain, hearing loss, sore throat, tinnitus and voice change. Eyes:  Negative for pain and visual disturbance. Respiratory:  Negative for cough, chest tightness, shortness of breath and wheezing.     Cardiovascular:  Negative for chest pain, palpitations and leg swelling. Gastrointestinal:  Negative for abdominal pain, constipation, diarrhea, nausea and vomiting. Genitourinary:  Negative for dysuria, frequency, hematuria and urgency. Musculoskeletal:  Negative for arthralgias, gait problem, joint swelling and neck pain. Skin:  Negative for color change and rash. Neurological:  Negative for dizziness, syncope, numbness and headaches. Hematological:  Negative for adenopathy. Psychiatric/Behavioral:  Negative for decreased concentration, hallucinations, sleep disturbance and suicidal ideas. The patient is not nervous/anxious. OBJECTIVE:  /84   Ht 5' 4\" (1.626 m)   Wt 259 lb 8 oz (117.7 kg)   BMI 44.54 kg/m²      PHYSICAL EXAM  Physical Exam  Constitutional:       General: She is not in acute distress. Appearance: Normal appearance. HENT:      Head: Normocephalic and atraumatic. Right Ear: External ear normal.      Left Ear: External ear normal.      Nose: Nose normal.      Mouth/Throat:      Mouth: Mucous membranes are moist.   Eyes:      General:         Right eye: No discharge. Left eye: No discharge. Extraocular Movements: Extraocular movements intact. Conjunctiva/sclera: Conjunctivae normal.      Pupils: Pupils are equal, round, and reactive to light. Cardiovascular:      Rate and Rhythm: Normal rate and regular rhythm. Heart sounds: Normal heart sounds. No murmur heard. Pulmonary:      Effort: Pulmonary effort is normal.      Breath sounds: Normal breath sounds. No wheezing, rhonchi or rales. Abdominal:      General: Bowel sounds are normal. There is no distension. Palpations: There is no mass. Tenderness: There is no abdominal tenderness. Musculoskeletal:         General: No tenderness. Normal range of motion. Cervical back: Normal range of motion and neck supple. Skin:     General: Skin is warm and dry. Findings: No erythema or rash.    Neurological:      General: No focal deficit present. Mental Status: She is alert and oriented to person, place, and time. Psychiatric:         Mood and Affect: Mood normal.        Medical problems and test results were reviewed with the patient today.      Recent Results (from the past 672 hour(s))   CBC with Auto Differential    Collection Time: 12/15/22 10:00 AM   Result Value Ref Range    WBC 6.7 4.3 - 11.1 K/uL    RBC 3.92 (L) 4.05 - 5.2 M/uL    Hemoglobin 12.1 11.7 - 15.4 g/dL    Hematocrit 39.5 35.8 - 46.3 %    .8 82 - 102 FL    MCH 30.9 26.1 - 32.9 PG    MCHC 30.6 (L) 31.4 - 35.0 g/dL    RDW 14.6 11.9 - 14.6 %    Platelets 142 464 - 294 K/uL    MPV 10.9 9.4 - 12.3 FL    nRBC 0.00 0.0 - 0.2 K/uL    Differential Type AUTOMATED      Seg Neutrophils 64 43 - 78 %    Lymphocytes 27 13 - 44 %    Monocytes 7 4.0 - 12.0 %    Eosinophils % 1 0.5 - 7.8 %    Basophils 1 0.0 - 2.0 %    Immature Granulocytes 0 0.0 - 5.0 %    Segs Absolute 4.3 1.7 - 8.2 K/UL    Absolute Lymph # 1.8 0.5 - 4.6 K/UL    Absolute Mono # 0.4 0.1 - 1.3 K/UL    Absolute Eos # 0.1 0.0 - 0.8 K/UL    Basophils Absolute 0.1 0.0 - 0.2 K/UL    Absolute Immature Granulocyte 0.0 0.0 - 0.5 K/UL   Comprehensive Metabolic Panel    Collection Time: 12/15/22 10:00 AM   Result Value Ref Range    Sodium 141 133 - 143 mmol/L    Potassium 5.1 3.5 - 5.1 mmol/L    Chloride 107 101 - 110 mmol/L    CO2 28 21 - 32 mmol/L    Anion Gap 6 2 - 11 mmol/L    Glucose 104 (H) 65 - 100 mg/dL    BUN 24 (H) 8 - 23 MG/DL    Creatinine 1.00 0.6 - 1.0 MG/DL    Est, Glom Filt Rate 59 (L) >60 ml/min/1.73m2    Calcium 9.7 8.3 - 10.4 MG/DL    Total Bilirubin 0.7 0.2 - 1.1 MG/DL    ALT 19 12 - 65 U/L    AST 16 15 - 37 U/L    Alk Phosphatase 89 50 - 136 U/L    Total Protein 7.2 6.3 - 8.2 g/dL    Albumin 4.1 3.2 - 4.6 g/dL    Globulin 3.1 2.8 - 4.5 g/dL    Albumin/Globulin Ratio 1.3 0.4 - 1.6     Hemoglobin A1C    Collection Time: 12/15/22 10:00 AM   Result Value Ref Range    Hemoglobin A1C 5.4 4.8 - 5.6 %    eAG 108 mg/dL   Lipid Panel    Collection Time: 12/15/22 10:00 AM   Result Value Ref Range    Cholesterol, Total 155 <200 MG/DL    Triglycerides 158 (H) 35 - 150 MG/DL    HDL 45 40 - 60 MG/DL    LDL Calculated 78.4 <100 MG/DL    VLDL Cholesterol Calculated 31.6 (H) 6.0 - 23.0 MG/DL    Chol/HDL Ratio 3.4     TSH    Collection Time: 12/15/22 10:00 AM   Result Value Ref Range    TSH, 3RD GENERATION 2.350 0.358 - 3.740 uIU/mL         ASSESSMENT and PLAN  Kiki Starks was seen today for cholesterol problem, hypertension and results. Diagnoses and all orders for this visit:    Mixed hyperlipidemia  -     rosuvastatin (CRESTOR) 5 MG tablet; Take 1 tablet by mouth every evening    Obesity, Class III, BMI 40-49.9 (morbid obesity) (HCC)    Impaired fasting blood sugar    Primary hypertension  -     lisinopril (PRINIVIL;ZESTRIL) 20 MG tablet; Take 1 tablet by mouth 2 times daily    Vitamin D deficiency    Gout, unspecified cause, unspecified chronicity, unspecified site  -     allopurinol (ZYLOPRIM) 300 MG tablet; Take 1 tablet by mouth daily    Gastroesophageal reflux disease without esophagitis  -     omeprazole (PRILOSEC) 20 MG delayed release capsule; Take 1 capsule by mouth daily  Discussed BMI and healthy weight and diet, weight bearing exercise, smoking avoidance, sun protection and medication compliance. The patient was counseled re screening procedures and recommended schedules for immunizations, mammography, Pap smears, GI hemoccult testing, colonoscopy and BMD.     FOLLOW UP  Return in about 6 months (around 6/22/2023) for Lipid/cmp and a1c OV 1 week after labs.

## 2023-06-23 ENCOUNTER — TELEPHONE (OUTPATIENT)
Dept: INTERNAL MEDICINE CLINIC | Facility: CLINIC | Age: 78
End: 2023-06-23

## 2023-06-23 ENCOUNTER — APPOINTMENT (OUTPATIENT)
Dept: CT IMAGING | Age: 78
End: 2023-06-23
Payer: MEDICARE

## 2023-06-23 ENCOUNTER — HOSPITAL ENCOUNTER (EMERGENCY)
Age: 78
Discharge: HOME | End: 2023-06-23
Attending: EMERGENCY MEDICINE
Payer: MEDICARE

## 2023-06-23 ENCOUNTER — APPOINTMENT (OUTPATIENT)
Dept: GENERAL RADIOLOGY | Age: 78
End: 2023-06-23
Payer: MEDICARE

## 2023-06-23 VITALS
HEIGHT: 64 IN | WEIGHT: 257 LBS | BODY MASS INDEX: 43.87 KG/M2 | RESPIRATION RATE: 19 BRPM | SYSTOLIC BLOOD PRESSURE: 125 MMHG | TEMPERATURE: 98.2 F | OXYGEN SATURATION: 99 % | DIASTOLIC BLOOD PRESSURE: 56 MMHG | HEART RATE: 60 BPM

## 2023-06-23 DIAGNOSIS — R00.2 PALPITATIONS: Primary | ICD-10-CM

## 2023-06-23 LAB
ANION GAP SERPL CALC-SCNC: 5 MMOL/L (ref 2–11)
BASOPHILS # BLD: 0.1 K/UL (ref 0–0.2)
BASOPHILS NFR BLD: 1 % (ref 0–2)
BUN SERPL-MCNC: 24 MG/DL (ref 8–23)
CALCIUM SERPL-MCNC: 9.7 MG/DL (ref 8.3–10.4)
CHLORIDE SERPL-SCNC: 109 MMOL/L (ref 101–110)
CO2 SERPL-SCNC: 27 MMOL/L (ref 21–32)
CREAT SERPL-MCNC: 1.1 MG/DL (ref 0.6–1)
D DIMER PPP FEU-MCNC: 1.07 UG/ML(FEU)
DIFFERENTIAL METHOD BLD: NORMAL
EKG DIAGNOSIS: NORMAL
EKG Q-T INTERVAL: 350 MS
EKG QRS DURATION: 82 MS
EKG QTC CALCULATION (BAZETT): 393 MS
EKG R AXIS: 28 DEGREES
EKG T AXIS: 71 DEGREES
EKG VENTRICULAR RATE: 76 BPM
EOSINOPHIL # BLD: 0.1 K/UL (ref 0–0.8)
EOSINOPHIL NFR BLD: 1 % (ref 0.5–7.8)
ERYTHROCYTE [DISTWIDTH] IN BLOOD BY AUTOMATED COUNT: 14.5 % (ref 11.9–14.6)
GLUCOSE BLD STRIP.AUTO-MCNC: 99 MG/DL (ref 65–100)
GLUCOSE SERPL-MCNC: 113 MG/DL (ref 65–100)
HCT VFR BLD AUTO: 40.3 % (ref 35.8–46.3)
HGB BLD-MCNC: 13 G/DL (ref 11.7–15.4)
IMM GRANULOCYTES # BLD AUTO: 0 K/UL (ref 0–0.5)
IMM GRANULOCYTES NFR BLD AUTO: 0 % (ref 0–5)
LYMPHOCYTES # BLD: 1.7 K/UL (ref 0.5–4.6)
LYMPHOCYTES NFR BLD: 26 % (ref 13–44)
MCH RBC QN AUTO: 31.3 PG (ref 26.1–32.9)
MCHC RBC AUTO-ENTMCNC: 32.3 G/DL (ref 31.4–35)
MCV RBC AUTO: 97.1 FL (ref 82–102)
MONOCYTES # BLD: 0.4 K/UL (ref 0.1–1.3)
MONOCYTES NFR BLD: 7 % (ref 4–12)
NEUTS SEG # BLD: 4.3 K/UL (ref 1.7–8.2)
NEUTS SEG NFR BLD: 65 % (ref 43–78)
NRBC # BLD: 0 K/UL (ref 0–0.2)
PLATELET # BLD AUTO: 183 K/UL (ref 150–450)
PMV BLD AUTO: 10.1 FL (ref 9.4–12.3)
POTASSIUM SERPL-SCNC: 4.3 MMOL/L (ref 3.5–5.1)
RBC # BLD AUTO: 4.15 M/UL (ref 4.05–5.2)
SERVICE CMNT-IMP: NORMAL
SODIUM SERPL-SCNC: 141 MMOL/L (ref 133–143)
TROPONIN I SERPL HS-MCNC: 3.8 PG/ML (ref 0–37)
WBC # BLD AUTO: 6.6 K/UL (ref 4.3–11.1)

## 2023-06-23 PROCEDURE — 96360 HYDRATION IV INFUSION INIT: CPT

## 2023-06-23 PROCEDURE — 93010 ELECTROCARDIOGRAM REPORT: CPT | Performed by: INTERNAL MEDICINE

## 2023-06-23 PROCEDURE — 2580000003 HC RX 258

## 2023-06-23 PROCEDURE — 71260 CT THORAX DX C+: CPT

## 2023-06-23 PROCEDURE — 82962 GLUCOSE BLOOD TEST: CPT

## 2023-06-23 PROCEDURE — 93005 ELECTROCARDIOGRAM TRACING: CPT | Performed by: EMERGENCY MEDICINE

## 2023-06-23 PROCEDURE — 80048 BASIC METABOLIC PNL TOTAL CA: CPT

## 2023-06-23 PROCEDURE — 71046 X-RAY EXAM CHEST 2 VIEWS: CPT

## 2023-06-23 PROCEDURE — 96361 HYDRATE IV INFUSION ADD-ON: CPT

## 2023-06-23 PROCEDURE — 85379 FIBRIN DEGRADATION QUANT: CPT

## 2023-06-23 PROCEDURE — 85025 COMPLETE CBC W/AUTO DIFF WBC: CPT

## 2023-06-23 PROCEDURE — 99285 EMERGENCY DEPT VISIT HI MDM: CPT

## 2023-06-23 PROCEDURE — 6360000004 HC RX CONTRAST MEDICATION

## 2023-06-23 PROCEDURE — 84484 ASSAY OF TROPONIN QUANT: CPT

## 2023-06-23 RX ORDER — 0.9 % SODIUM CHLORIDE 0.9 %
100 INTRAVENOUS SOLUTION INTRAVENOUS
Status: COMPLETED | OUTPATIENT
Start: 2023-06-23 | End: 2023-06-23

## 2023-06-23 RX ORDER — SODIUM CHLORIDE 0.9 % (FLUSH) 0.9 %
10 SYRINGE (ML) INJECTION
Status: COMPLETED | OUTPATIENT
Start: 2023-06-23 | End: 2023-06-23

## 2023-06-23 RX ORDER — 0.9 % SODIUM CHLORIDE 0.9 %
500 INTRAVENOUS SOLUTION INTRAVENOUS ONCE
Status: COMPLETED | OUTPATIENT
Start: 2023-06-23 | End: 2023-06-23

## 2023-06-23 RX ADMIN — IOPAMIDOL 100 ML: 755 INJECTION, SOLUTION INTRAVENOUS at 15:39

## 2023-06-23 RX ADMIN — SODIUM CHLORIDE 500 ML: 9 INJECTION, SOLUTION INTRAVENOUS at 14:32

## 2023-06-23 RX ADMIN — SODIUM CHLORIDE 100 ML: 9 INJECTION, SOLUTION INTRAVENOUS at 15:40

## 2023-06-23 RX ADMIN — SODIUM CHLORIDE, PRESERVATIVE FREE 10 ML: 5 INJECTION INTRAVENOUS at 15:40

## 2023-06-23 ASSESSMENT — PAIN DESCRIPTION - ORIENTATION: ORIENTATION: MID

## 2023-06-23 ASSESSMENT — ENCOUNTER SYMPTOMS
SHORTNESS OF BREATH: 1
VOMITING: 0
DIARRHEA: 0
COLOR CHANGE: 0
NAUSEA: 1
ABDOMINAL PAIN: 0

## 2023-06-23 ASSESSMENT — LIFESTYLE VARIABLES
HOW MANY STANDARD DRINKS CONTAINING ALCOHOL DO YOU HAVE ON A TYPICAL DAY: PATIENT DOES NOT DRINK
HOW OFTEN DO YOU HAVE A DRINK CONTAINING ALCOHOL: NEVER

## 2023-06-23 ASSESSMENT — PAIN - FUNCTIONAL ASSESSMENT: PAIN_FUNCTIONAL_ASSESSMENT: 0-10

## 2023-06-23 ASSESSMENT — PAIN DESCRIPTION - DESCRIPTORS: DESCRIPTORS: PRESSURE

## 2023-06-23 ASSESSMENT — PAIN DESCRIPTION - LOCATION: LOCATION: CHEST

## 2023-06-23 ASSESSMENT — PAIN SCALES - GENERAL: PAINLEVEL_OUTOF10: 2

## 2023-06-23 NOTE — ED PROVIDER NOTES
Emergency Department Provider Note       PCP: TRINITY Holland   Age: 69 y.o. Sex: female     DISPOSITION Decision To Discharge 06/23/2023 04:48:54 PM       ICD-10-CM    1. Palpitations  R00.2           Medical Decision Making     Complexity of Problems Addressed:  1 or more acute illnesses that pose a threat to life or bodily function. Data Reviewed and Analyzed:  Category 1:   I independently ordered and reviewed each unique test.  I reviewed external records: provider visit note from PCP. Category 2:   I independently ordered and interpreted the ED EKG in the absence of a Cardiologist.    Rate: 76  EKG Interpretation: Sinus rhythm, no acute deep ligation, normal axis  ST Segments: No STEMI  I independently interpreted the cardiac monitor rhythm strip sinus rhythm rate 76. I interpreted the X-rays chest x-ray negative for pleural effusion, pneumothorax, pulmonary edema, I am in agreement the radiologist interpretation. I interpreted the CT Scan CT chest PE protocol negative for pulmonary embolism, acute cardiopulmonary abnormality. Patient does have a 4 mm nodule right upper lobe. I am in agreement with radiologist interpretation. Category 3: Discussion of management or test interpretation. Vital signs reviewed, patient stable, NAD, afebrile, nontoxic in appearance   No tachycardia, O2 saturation 99% on room air    In summary this is a 67-year-old female who presents emergency department today with chief complaint of palpitations and an episode of feeling nauseous today. Patient denies any chest pain. States she has a history of PVCs but due to some nausea today she contacted her primary care provider who referred her to the emergency department. will pain chest pain work-up and D-dimer. If elevated D-dimer will obtain CT chest PE protocol. Will administer IV fluids prior to CT imaging today. Patient states she does not need anything for pain.   Is not anything for nausea as she is

## 2023-06-23 NOTE — DISCHARGE SUMMARY
Pt stable for DC, VSS, no IV to remove. DC instructions reviewed with patient - patient and daughter express understanding. Questions answered. Pt DC home with daughter.

## 2023-06-23 NOTE — ED TRIAGE NOTES
Per patient chest palpations x6 days with intermittent associated sob denies cough. Denies gi/gu complications. Denies fever/chills.

## 2023-06-23 NOTE — DISCHARGE INSTRUCTIONS
Evaluated in the emergency department today for palpitations  Cardiac work-up today is reassuring  CT of your chest is negative for blood clot. There is an incidental 4 mm nodule in your right upper lobe. Have your primary care doctor go over risk to see if you need a follow-up CT in the next year. I have included the risk assessment name below in your discharge paperwork    Contact your cardiologist to schedule follow-up within the next week or 2    Return to the emergency department for chest pain, shortness of breath, signs and symptoms of stroke as listed in your discharge paperwork    Incidental 4 mm right upper lobe pulmonary nodule. If the patient is  considered high risk per Fleischner Society guidelines, follow-up CT in one year  is recommended. If not high risk, no further follow-up required.

## 2023-06-23 NOTE — TELEPHONE ENCOUNTER
The patient was referred to the ER. She is  having chest discomfort, irregular heartbeats and feeling nauseated.

## 2023-07-27 DIAGNOSIS — I10 PRIMARY HYPERTENSION: ICD-10-CM

## 2023-07-27 DIAGNOSIS — K21.9 GASTROESOPHAGEAL REFLUX DISEASE WITHOUT ESOPHAGITIS: ICD-10-CM

## 2023-07-27 DIAGNOSIS — M10.9 GOUT, UNSPECIFIED CAUSE, UNSPECIFIED CHRONICITY, UNSPECIFIED SITE: ICD-10-CM

## 2023-07-27 RX ORDER — OMEPRAZOLE 20 MG/1
20 CAPSULE, DELAYED RELEASE ORAL DAILY
Qty: 90 CAPSULE | Refills: 1 | Status: SHIPPED | OUTPATIENT
Start: 2023-07-27

## 2023-07-27 RX ORDER — ALLOPURINOL 300 MG/1
300 TABLET ORAL DAILY
Qty: 90 TABLET | Refills: 1 | Status: SHIPPED | OUTPATIENT
Start: 2023-07-27

## 2023-07-27 RX ORDER — LISINOPRIL 20 MG/1
20 TABLET ORAL DAILY
Qty: 180 TABLET | Refills: 1 | Status: SHIPPED | OUTPATIENT
Start: 2023-07-27

## 2023-08-01 DIAGNOSIS — I10 PRIMARY HYPERTENSION: ICD-10-CM

## 2023-08-01 NOTE — TELEPHONE ENCOUNTER
Sital back in 12/22 pt was taken rx as Lisinopril 20 mg BID. On 7/27/23 rx was sent in as 20 mg daily but qt 180 was sent in. I did go ahead and re-sent  rx as one BID qt 180 if this is correct.   If not I will call pt to let her know

## 2023-08-02 RX ORDER — LISINOPRIL 20 MG/1
20 TABLET ORAL 2 TIMES DAILY
Qty: 180 TABLET | Refills: 1 | Status: SHIPPED | OUTPATIENT
Start: 2023-08-02

## 2023-08-20 SDOH — ECONOMIC STABILITY: FOOD INSECURITY: WITHIN THE PAST 12 MONTHS, YOU WORRIED THAT YOUR FOOD WOULD RUN OUT BEFORE YOU GOT MONEY TO BUY MORE.: PATIENT DECLINED

## 2023-08-20 SDOH — ECONOMIC STABILITY: TRANSPORTATION INSECURITY
IN THE PAST 12 MONTHS, HAS LACK OF TRANSPORTATION KEPT YOU FROM MEETINGS, WORK, OR FROM GETTING THINGS NEEDED FOR DAILY LIVING?: PATIENT DECLINED

## 2023-08-20 SDOH — ECONOMIC STABILITY: HOUSING INSECURITY
IN THE LAST 12 MONTHS, WAS THERE A TIME WHEN YOU DID NOT HAVE A STEADY PLACE TO SLEEP OR SLEPT IN A SHELTER (INCLUDING NOW)?: PATIENT REFUSED

## 2023-08-20 SDOH — ECONOMIC STABILITY: FOOD INSECURITY: WITHIN THE PAST 12 MONTHS, THE FOOD YOU BOUGHT JUST DIDN'T LAST AND YOU DIDN'T HAVE MONEY TO GET MORE.: PATIENT DECLINED

## 2023-08-20 SDOH — ECONOMIC STABILITY: INCOME INSECURITY: HOW HARD IS IT FOR YOU TO PAY FOR THE VERY BASICS LIKE FOOD, HOUSING, MEDICAL CARE, AND HEATING?: PATIENT DECLINED

## 2023-08-20 ASSESSMENT — PATIENT HEALTH QUESTIONNAIRE - PHQ9
SUM OF ALL RESPONSES TO PHQ QUESTIONS 1-9: 0
SUM OF ALL RESPONSES TO PHQ QUESTIONS 1-9: 0
SUM OF ALL RESPONSES TO PHQ9 QUESTIONS 1 & 2: 0
1. LITTLE INTEREST OR PLEASURE IN DOING THINGS: 0
1. LITTLE INTEREST OR PLEASURE IN DOING THINGS: NOT AT ALL
2. FEELING DOWN, DEPRESSED OR HOPELESS: NOT AT ALL
SUM OF ALL RESPONSES TO PHQ QUESTIONS 1-9: 0
SUM OF ALL RESPONSES TO PHQ9 QUESTIONS 1 & 2: 0
SUM OF ALL RESPONSES TO PHQ QUESTIONS 1-9: 0
2. FEELING DOWN, DEPRESSED OR HOPELESS: 0

## 2023-08-22 ENCOUNTER — OFFICE VISIT (OUTPATIENT)
Dept: INTERNAL MEDICINE CLINIC | Facility: CLINIC | Age: 78
End: 2023-08-22
Payer: MEDICARE

## 2023-08-22 VITALS
SYSTOLIC BLOOD PRESSURE: 122 MMHG | HEIGHT: 64 IN | DIASTOLIC BLOOD PRESSURE: 72 MMHG | WEIGHT: 255 LBS | BODY MASS INDEX: 43.54 KG/M2

## 2023-08-22 DIAGNOSIS — R73.01 IMPAIRED FASTING BLOOD SUGAR: ICD-10-CM

## 2023-08-22 DIAGNOSIS — K58.2 IRRITABLE BOWEL SYNDROME WITH BOTH CONSTIPATION AND DIARRHEA: ICD-10-CM

## 2023-08-22 DIAGNOSIS — E78.2 MIXED HYPERLIPIDEMIA: Primary | ICD-10-CM

## 2023-08-22 DIAGNOSIS — Z80.0 FAMILY HISTORY OF COLON CANCER IN MOTHER: ICD-10-CM

## 2023-08-22 DIAGNOSIS — E66.01 OBESITY, CLASS III, BMI 40-49.9 (MORBID OBESITY) (HCC): ICD-10-CM

## 2023-08-22 DIAGNOSIS — Z12.31 ENCOUNTER FOR SCREENING MAMMOGRAM FOR BREAST CANCER: ICD-10-CM

## 2023-08-22 DIAGNOSIS — E55.9 VITAMIN D DEFICIENCY: ICD-10-CM

## 2023-08-22 PROCEDURE — 99214 OFFICE O/P EST MOD 30 MIN: CPT | Performed by: PHYSICIAN ASSISTANT

## 2023-08-22 PROCEDURE — 3078F DIAST BP <80 MM HG: CPT | Performed by: PHYSICIAN ASSISTANT

## 2023-08-22 PROCEDURE — 1123F ACP DISCUSS/DSCN MKR DOCD: CPT | Performed by: PHYSICIAN ASSISTANT

## 2023-08-22 PROCEDURE — G8399 PT W/DXA RESULTS DOCUMENT: HCPCS | Performed by: PHYSICIAN ASSISTANT

## 2023-08-22 PROCEDURE — 1036F TOBACCO NON-USER: CPT | Performed by: PHYSICIAN ASSISTANT

## 2023-08-22 PROCEDURE — G8417 CALC BMI ABV UP PARAM F/U: HCPCS | Performed by: PHYSICIAN ASSISTANT

## 2023-08-22 PROCEDURE — G8427 DOCREV CUR MEDS BY ELIG CLIN: HCPCS | Performed by: PHYSICIAN ASSISTANT

## 2023-08-22 PROCEDURE — 1090F PRES/ABSN URINE INCON ASSESS: CPT | Performed by: PHYSICIAN ASSISTANT

## 2023-08-22 PROCEDURE — 3074F SYST BP LT 130 MM HG: CPT | Performed by: PHYSICIAN ASSISTANT

## 2023-08-22 RX ORDER — ROSUVASTATIN CALCIUM 5 MG/1
5 TABLET, COATED ORAL EVERY EVENING
Qty: 90 TABLET | Refills: 1 | Status: SHIPPED | OUTPATIENT
Start: 2023-08-22

## 2023-08-22 ASSESSMENT — ENCOUNTER SYMPTOMS
VOMITING: 0
CHEST TIGHTNESS: 0
WHEEZING: 0
EYE PAIN: 0
NAUSEA: 0
COLOR CHANGE: 0
ABDOMINAL PAIN: 0
SORE THROAT: 0
SHORTNESS OF BREATH: 0
VOICE CHANGE: 0
COUGH: 0
DIARRHEA: 0
CONSTIPATION: 0

## 2023-09-25 ENCOUNTER — HOSPITAL ENCOUNTER (OUTPATIENT)
Dept: MAMMOGRAPHY | Age: 78
Discharge: HOME | End: 2023-09-28
Payer: MEDICARE

## 2023-09-25 DIAGNOSIS — Z12.31 ENCOUNTER FOR SCREENING MAMMOGRAM FOR BREAST CANCER: ICD-10-CM

## 2023-09-25 PROCEDURE — 77063 BREAST TOMOSYNTHESIS BI: CPT

## 2023-12-26 NOTE — PROGRESS NOTES
Lillia Lefort  : 1945  [place]  Secondary: [employer] Generic [address] [facility]  at [place] [id number] [facility], [address] [place] [address],[address], [place] [address].  [phone]   [phone]      Pt called to cancel appointment today due to not feeling well. Will follow up with her on next visit.     Rodrigo Todd, PTA No

## 2024-01-17 ENCOUNTER — HOSPITAL ENCOUNTER (INPATIENT)
Age: 79
LOS: 1 days | Discharge: HOME | DRG: 177 | End: 2024-01-18
Attending: EMERGENCY MEDICINE | Admitting: INTERNAL MEDICINE
Payer: MEDICARE

## 2024-01-17 ENCOUNTER — APPOINTMENT (OUTPATIENT)
Dept: CT IMAGING | Age: 79
DRG: 177 | End: 2024-01-17
Payer: MEDICARE

## 2024-01-17 DIAGNOSIS — R06.09 DOE (DYSPNEA ON EXERTION): ICD-10-CM

## 2024-01-17 DIAGNOSIS — U07.1 COVID: Primary | ICD-10-CM

## 2024-01-17 DIAGNOSIS — R09.02 HYPOXIA: ICD-10-CM

## 2024-01-17 PROBLEM — J96.01 ACUTE RESPIRATORY FAILURE WITH HYPOXIA (HCC): Status: ACTIVE | Noted: 2024-01-17

## 2024-01-17 LAB
ALBUMIN SERPL-MCNC: 3.6 G/DL (ref 3.2–4.6)
ALBUMIN/GLOB SERPL: 1 (ref 0.4–1.6)
ALP SERPL-CCNC: 89 U/L (ref 50–130)
ALT SERPL-CCNC: 16 U/L (ref 12–65)
ANION GAP SERPL CALC-SCNC: 5 MMOL/L (ref 2–11)
AST SERPL-CCNC: 21 U/L (ref 15–37)
BASOPHILS # BLD: 0 K/UL (ref 0–0.2)
BASOPHILS NFR BLD: 1 % (ref 0–2)
BILIRUB SERPL-MCNC: 0.6 MG/DL (ref 0.2–1.1)
BUN SERPL-MCNC: 18 MG/DL (ref 8–23)
CALCIUM SERPL-MCNC: 8.9 MG/DL (ref 8.3–10.4)
CHLORIDE SERPL-SCNC: 105 MMOL/L (ref 103–113)
CO2 SERPL-SCNC: 27 MMOL/L (ref 21–32)
CREAT SERPL-MCNC: 1.03 MG/DL (ref 0.6–1)
CRP SERPL-MCNC: 2.6 MG/DL (ref 0–0.9)
DIFFERENTIAL METHOD BLD: ABNORMAL
EKG ATRIAL RATE: 60 BPM
EKG DIAGNOSIS: NORMAL
EKG P AXIS: 59 DEGREES
EKG P-R INTERVAL: 178 MS
EKG Q-T INTERVAL: 407 MS
EKG QRS DURATION: 94 MS
EKG QTC CALCULATION (BAZETT): 407 MS
EKG R AXIS: 69 DEGREES
EKG T AXIS: 66 DEGREES
EKG VENTRICULAR RATE: 60 BPM
EOSINOPHIL # BLD: 0 K/UL (ref 0–0.8)
EOSINOPHIL NFR BLD: 1 % (ref 0.5–7.8)
ERYTHROCYTE [DISTWIDTH] IN BLOOD BY AUTOMATED COUNT: 13.6 % (ref 11.9–14.6)
GLOBULIN SER CALC-MCNC: 3.6 G/DL (ref 2.8–4.5)
GLUCOSE SERPL-MCNC: 106 MG/DL (ref 65–100)
HCT VFR BLD AUTO: 39.1 % (ref 35.8–46.3)
HGB BLD-MCNC: 12.2 G/DL (ref 11.7–15.4)
IMM GRANULOCYTES # BLD AUTO: 0 K/UL (ref 0–0.5)
IMM GRANULOCYTES NFR BLD AUTO: 0 % (ref 0–5)
LYMPHOCYTES # BLD: 1.2 K/UL (ref 0.5–4.6)
LYMPHOCYTES NFR BLD: 27 % (ref 13–44)
MAGNESIUM SERPL-MCNC: 2.1 MG/DL (ref 1.8–2.4)
MCH RBC QN AUTO: 29.6 PG (ref 26.1–32.9)
MCHC RBC AUTO-ENTMCNC: 31.2 G/DL (ref 31.4–35)
MCV RBC AUTO: 94.9 FL (ref 82–102)
MONOCYTES # BLD: 0.4 K/UL (ref 0.1–1.3)
MONOCYTES NFR BLD: 8 % (ref 4–12)
NEUTS SEG # BLD: 2.7 K/UL (ref 1.7–8.2)
NEUTS SEG NFR BLD: 63 % (ref 43–78)
NRBC # BLD: 0 K/UL (ref 0–0.2)
NT PRO BNP: 142 PG/ML
PLATELET # BLD AUTO: 170 K/UL (ref 150–450)
PMV BLD AUTO: 9.6 FL (ref 9.4–12.3)
POTASSIUM SERPL-SCNC: 4.1 MMOL/L (ref 3.5–5.1)
PROT SERPL-MCNC: 7.2 G/DL (ref 6.3–8.2)
RBC # BLD AUTO: 4.12 M/UL (ref 4.05–5.2)
SODIUM SERPL-SCNC: 137 MMOL/L (ref 136–146)
TROPONIN I SERPL HS-MCNC: 8.8 PG/ML (ref 0–14)
WBC # BLD AUTO: 4.2 K/UL (ref 4.3–11.1)

## 2024-01-17 PROCEDURE — 83735 ASSAY OF MAGNESIUM: CPT

## 2024-01-17 PROCEDURE — 96374 THER/PROPH/DIAG INJ IV PUSH: CPT

## 2024-01-17 PROCEDURE — 6370000000 HC RX 637 (ALT 250 FOR IP): Performed by: NURSE PRACTITIONER

## 2024-01-17 PROCEDURE — 1100000000 HC RM PRIVATE

## 2024-01-17 PROCEDURE — 6360000002 HC RX W HCPCS: Performed by: NURSE PRACTITIONER

## 2024-01-17 PROCEDURE — 94640 AIRWAY INHALATION TREATMENT: CPT

## 2024-01-17 PROCEDURE — 84484 ASSAY OF TROPONIN QUANT: CPT

## 2024-01-17 PROCEDURE — 85025 COMPLETE CBC W/AUTO DIFF WBC: CPT

## 2024-01-17 PROCEDURE — 93010 ELECTROCARDIOGRAM REPORT: CPT | Performed by: INTERNAL MEDICINE

## 2024-01-17 PROCEDURE — 96372 THER/PROPH/DIAG INJ SC/IM: CPT

## 2024-01-17 PROCEDURE — 94760 N-INVAS EAR/PLS OXIMETRY 1: CPT

## 2024-01-17 PROCEDURE — 2580000003 HC RX 258: Performed by: NURSE PRACTITIONER

## 2024-01-17 PROCEDURE — 80053 COMPREHEN METABOLIC PANEL: CPT

## 2024-01-17 PROCEDURE — 6360000002 HC RX W HCPCS: Performed by: STUDENT IN AN ORGANIZED HEALTH CARE EDUCATION/TRAINING PROGRAM

## 2024-01-17 PROCEDURE — 83880 ASSAY OF NATRIURETIC PEPTIDE: CPT

## 2024-01-17 PROCEDURE — 94762 N-INVAS EAR/PLS OXIMTRY CONT: CPT

## 2024-01-17 PROCEDURE — 6370000000 HC RX 637 (ALT 250 FOR IP): Performed by: STUDENT IN AN ORGANIZED HEALTH CARE EDUCATION/TRAINING PROGRAM

## 2024-01-17 PROCEDURE — 71260 CT THORAX DX C+: CPT

## 2024-01-17 PROCEDURE — 93005 ELECTROCARDIOGRAM TRACING: CPT | Performed by: STUDENT IN AN ORGANIZED HEALTH CARE EDUCATION/TRAINING PROGRAM

## 2024-01-17 PROCEDURE — 86140 C-REACTIVE PROTEIN: CPT

## 2024-01-17 PROCEDURE — 71260 CT THORAX DX C+: CPT | Performed by: RADIOLOGY

## 2024-01-17 PROCEDURE — 99285 EMERGENCY DEPT VISIT HI MDM: CPT

## 2024-01-17 PROCEDURE — 6360000004 HC RX CONTRAST MEDICATION: Performed by: EMERGENCY MEDICINE

## 2024-01-17 RX ORDER — MECLIZINE HYDROCHLORIDE 25 MG/1
50 TABLET ORAL
Status: COMPLETED | OUTPATIENT
Start: 2024-01-17 | End: 2024-01-17

## 2024-01-17 RX ORDER — HYDRALAZINE HYDROCHLORIDE 20 MG/ML
5 INJECTION INTRAMUSCULAR; INTRAVENOUS EVERY 8 HOURS PRN
Status: DISCONTINUED | OUTPATIENT
Start: 2024-01-17 | End: 2024-01-18 | Stop reason: HOSPADM

## 2024-01-17 RX ORDER — DEXAMETHASONE 4 MG/1
6 TABLET ORAL DAILY
Status: DISCONTINUED | OUTPATIENT
Start: 2024-01-17 | End: 2024-01-17

## 2024-01-17 RX ORDER — SODIUM CHLORIDE 0.9 % (FLUSH) 0.9 %
5-40 SYRINGE (ML) INJECTION EVERY 12 HOURS SCHEDULED
Status: DISCONTINUED | OUTPATIENT
Start: 2024-01-17 | End: 2024-01-18 | Stop reason: HOSPADM

## 2024-01-17 RX ORDER — DEXAMETHASONE SODIUM PHOSPHATE 10 MG/ML
10 INJECTION INTRAMUSCULAR; INTRAVENOUS ONCE
Status: COMPLETED | OUTPATIENT
Start: 2024-01-17 | End: 2024-01-17

## 2024-01-17 RX ORDER — MAGNESIUM HYDROXIDE/ALUMINUM HYDROXICE/SIMETHICONE 120; 1200; 1200 MG/30ML; MG/30ML; MG/30ML
30 SUSPENSION ORAL EVERY 6 HOURS PRN
Status: DISCONTINUED | OUTPATIENT
Start: 2024-01-17 | End: 2024-01-18 | Stop reason: HOSPADM

## 2024-01-17 RX ORDER — FAMOTIDINE 20 MG/1
10 TABLET, FILM COATED ORAL 2 TIMES DAILY PRN
Status: DISCONTINUED | OUTPATIENT
Start: 2024-01-17 | End: 2024-01-18 | Stop reason: HOSPADM

## 2024-01-17 RX ORDER — NICOTINE 21 MG/24HR
1 PATCH, TRANSDERMAL 24 HOURS TRANSDERMAL DAILY PRN
Status: DISCONTINUED | OUTPATIENT
Start: 2024-01-17 | End: 2024-01-18 | Stop reason: HOSPADM

## 2024-01-17 RX ORDER — ACETAMINOPHEN 325 MG/1
650 TABLET ORAL EVERY 6 HOURS PRN
Status: DISCONTINUED | OUTPATIENT
Start: 2024-01-17 | End: 2024-01-18 | Stop reason: HOSPADM

## 2024-01-17 RX ORDER — LISINOPRIL 20 MG/1
20 TABLET ORAL 2 TIMES DAILY
Status: DISCONTINUED | OUTPATIENT
Start: 2024-01-17 | End: 2024-01-18 | Stop reason: HOSPADM

## 2024-01-17 RX ORDER — ACETAMINOPHEN 650 MG/1
650 SUPPOSITORY RECTAL EVERY 6 HOURS PRN
Status: DISCONTINUED | OUTPATIENT
Start: 2024-01-17 | End: 2024-01-18 | Stop reason: HOSPADM

## 2024-01-17 RX ORDER — ASCORBIC ACID 500 MG
500 TABLET ORAL DAILY
Status: DISCONTINUED | OUTPATIENT
Start: 2024-01-18 | End: 2024-01-18 | Stop reason: HOSPADM

## 2024-01-17 RX ORDER — GUAIFENESIN/DEXTROMETHORPHAN 100-10MG/5
10 SYRUP ORAL EVERY 4 HOURS PRN
Status: DISCONTINUED | OUTPATIENT
Start: 2024-01-17 | End: 2024-01-18 | Stop reason: HOSPADM

## 2024-01-17 RX ORDER — POLYETHYLENE GLYCOL 3350 17 G/17G
17 POWDER, FOR SOLUTION ORAL DAILY PRN
Status: DISCONTINUED | OUTPATIENT
Start: 2024-01-17 | End: 2024-01-18 | Stop reason: HOSPADM

## 2024-01-17 RX ORDER — ENOXAPARIN SODIUM 100 MG/ML
30 INJECTION SUBCUTANEOUS EVERY 12 HOURS
Status: DISCONTINUED | OUTPATIENT
Start: 2024-01-17 | End: 2024-01-18 | Stop reason: HOSPADM

## 2024-01-17 RX ORDER — LOPERAMIDE HYDROCHLORIDE 2 MG/1
2 CAPSULE ORAL 4 TIMES DAILY PRN
Status: DISCONTINUED | OUTPATIENT
Start: 2024-01-17 | End: 2024-01-18 | Stop reason: HOSPADM

## 2024-01-17 RX ORDER — ROSUVASTATIN CALCIUM 5 MG/1
5 TABLET, COATED ORAL
Status: DISCONTINUED | OUTPATIENT
Start: 2024-01-17 | End: 2024-01-18 | Stop reason: HOSPADM

## 2024-01-17 RX ORDER — GUAIFENESIN 600 MG/1
600 TABLET, EXTENDED RELEASE ORAL 2 TIMES DAILY
Status: DISCONTINUED | OUTPATIENT
Start: 2024-01-17 | End: 2024-01-18 | Stop reason: HOSPADM

## 2024-01-17 RX ORDER — SODIUM CHLORIDE 9 MG/ML
INJECTION, SOLUTION INTRAVENOUS CONTINUOUS
Status: DISCONTINUED | OUTPATIENT
Start: 2024-01-17 | End: 2024-01-18 | Stop reason: HOSPADM

## 2024-01-17 RX ORDER — ONDANSETRON 4 MG/1
4 TABLET, ORALLY DISINTEGRATING ORAL EVERY 8 HOURS PRN
Status: DISCONTINUED | OUTPATIENT
Start: 2024-01-17 | End: 2024-01-18 | Stop reason: HOSPADM

## 2024-01-17 RX ORDER — ONDANSETRON 2 MG/ML
4 INJECTION INTRAMUSCULAR; INTRAVENOUS EVERY 6 HOURS PRN
Status: DISCONTINUED | OUTPATIENT
Start: 2024-01-17 | End: 2024-01-18 | Stop reason: HOSPADM

## 2024-01-17 RX ORDER — PANTOPRAZOLE SODIUM 40 MG/1
40 TABLET, DELAYED RELEASE ORAL
Status: DISCONTINUED | OUTPATIENT
Start: 2024-01-18 | End: 2024-01-18 | Stop reason: HOSPADM

## 2024-01-17 RX ORDER — SODIUM CHLORIDE 9 MG/ML
INJECTION, SOLUTION INTRAVENOUS PRN
Status: DISCONTINUED | OUTPATIENT
Start: 2024-01-17 | End: 2024-01-18 | Stop reason: HOSPADM

## 2024-01-17 RX ORDER — BISACODYL 10 MG
10 SUPPOSITORY, RECTAL RECTAL DAILY PRN
Status: DISCONTINUED | OUTPATIENT
Start: 2024-01-17 | End: 2024-01-18 | Stop reason: HOSPADM

## 2024-01-17 RX ORDER — BENZONATATE 100 MG/1
100 CAPSULE ORAL 3 TIMES DAILY PRN
Status: DISCONTINUED | OUTPATIENT
Start: 2024-01-17 | End: 2024-01-18 | Stop reason: HOSPADM

## 2024-01-17 RX ORDER — SODIUM CHLORIDE 0.9 % (FLUSH) 0.9 %
5-40 SYRINGE (ML) INJECTION PRN
Status: DISCONTINUED | OUTPATIENT
Start: 2024-01-17 | End: 2024-01-18 | Stop reason: HOSPADM

## 2024-01-17 RX ORDER — DEXAMETHASONE 4 MG/1
6 TABLET ORAL DAILY
Status: DISCONTINUED | OUTPATIENT
Start: 2024-01-18 | End: 2024-01-18 | Stop reason: HOSPADM

## 2024-01-17 RX ORDER — ALLOPURINOL 300 MG/1
300 TABLET ORAL DAILY
Status: DISCONTINUED | OUTPATIENT
Start: 2024-01-18 | End: 2024-01-18 | Stop reason: HOSPADM

## 2024-01-17 RX ADMIN — ROSUVASTATIN CALCIUM 5 MG: 5 TABLET, COATED ORAL at 22:21

## 2024-01-17 RX ADMIN — LOPERAMIDE HYDROCHLORIDE 2 MG: 2 CAPSULE ORAL at 20:14

## 2024-01-17 RX ADMIN — SODIUM CHLORIDE: 9 INJECTION, SOLUTION INTRAVENOUS at 17:24

## 2024-01-17 RX ADMIN — MECLIZINE HYDROCHLORIDE 50 MG: 25 TABLET ORAL at 10:19

## 2024-01-17 RX ADMIN — IPRATROPIUM BROMIDE 0.5 MG: 0.5 SOLUTION RESPIRATORY (INHALATION) at 20:34

## 2024-01-17 RX ADMIN — DEXAMETHASONE SODIUM PHOSPHATE 10 MG: 10 INJECTION INTRAMUSCULAR; INTRAVENOUS at 10:20

## 2024-01-17 RX ADMIN — GUAIFENESIN 600 MG: 600 TABLET, EXTENDED RELEASE ORAL at 20:14

## 2024-01-17 RX ADMIN — LISINOPRIL 20 MG: 20 TABLET ORAL at 20:14

## 2024-01-17 RX ADMIN — IOPAMIDOL 100 ML: 755 INJECTION, SOLUTION INTRAVENOUS at 12:21

## 2024-01-17 RX ADMIN — ENOXAPARIN SODIUM 30 MG: 100 INJECTION SUBCUTANEOUS at 22:21

## 2024-01-17 ASSESSMENT — ENCOUNTER SYMPTOMS
TROUBLE SWALLOWING: 0
SORE THROAT: 1
COUGH: 1
PHOTOPHOBIA: 0
ABDOMINAL PAIN: 0
FACIAL SWELLING: 0
VOMITING: 0
SHORTNESS OF BREATH: 1

## 2024-01-17 ASSESSMENT — PAIN - FUNCTIONAL ASSESSMENT: PAIN_FUNCTIONAL_ASSESSMENT: NONE - DENIES PAIN

## 2024-01-17 NOTE — CARE COORDINATION
COBY met with patient and her daughter Esha at the bedside. Patient states that she lives alone and local family that's supportive. Patient is insured and established with primary care. Patient uses a rollator to ambulate, is independent in her ADLs, and states she fell around Kesha. Patient denies that falls are frequent or severe. Patient anticipated to admit. CM dept will continue to follow further workup and evaluations for new or additional needs that may arise during admission. CMI, Milestones, and ACP completed.     ASSESSMENT NOTE    Attending Physician: Chip Brasher MD  Admit Problem: No admission diagnoses are documented for this encounter.  Date/Time of Admission: 1/17/2024  9:30 AM  Problem List:  Patient Active Problem List   Diagnosis    Precordial pain    Edema    Gout    Recurrent incisional hernia with incarceration    Hyperglycemia    Obesity    Other screening mammogram    Mixed hyperlipidemia    Hypertension    Menopause    Hyperlipidemia    IBS (irritable bowel syndrome)    Osteoarthritis    S/P hernia repair    Total knee replacement status    Obesity, morbid (HCC)    Osteoarthritis of left knee    Superior glenoid labrum lesion    Vitamin D deficiency    Anemia associated with acute blood loss    Chronic bilateral low back pain with left-sided sciatica    Chronic pain of left knee    Dyslipidemia    Iron deficiency anemia    Macular RPE mottling    Myopia with presbyopia of both eyes    Osteopenia of multiple sites    PCO (posterior capsular opacification), bilateral    Posterior vitreous detachment of right eye    Premature supraventricular beats    Venous stasis dermatitis of both lower extremities    Lipodermatosclerosis of both lower extremities    Essential hypertension       Service Assessment  Patient Orientation Alert and Oriented, Person, Place, Situation, Self   Cognition Alert   History Provided By Patient   Primary Caregiver Self   Accompanied By/Relationship     Support

## 2024-01-17 NOTE — PROGRESS NOTES
TRANSFER - IN REPORT:    Verbal report received from FOUZIA Romero on Fanny Cho  being received from ER for routine progression of patient care      Report consisted of patient's Situation, Background, Assessment and   Recommendations(SBAR).     Information from the following report(s) ED Encounter Summary and ED SBAR was reviewed with the receiving nurse.    Opportunity for questions and clarification was provided.      Assessment completed upon patient's arrival to unit and care assumed.

## 2024-01-17 NOTE — ED PROVIDER NOTES
Emergency Department Provider Note       PCP: Thor Alonzo PA   Age: 78 y.o.   Sex: female     DISPOSITION Admitted 01/17/2024 01:48:51 PM       ICD-10-CM    1. COVID  U07.1       2. [place] (dyspnea on exertion)  R06.09       3. Hypoxia  R09.02           Medical Decision Making     Complexity of Problems Addressed:  1 or more acute illnesses that pose a threat to life or bodily function.     Data Reviewed and Analyzed:   I independently ordered and reviewed each unique test.     The patients assessment required an independent historian: Patient daughter.  The reason they were needed is important historical information not provided by the patient.    I independently ordered and interpreted the ED EKG in the absence of a Cardiologist.    Rate: 60  EKG Interpretation: EKG Interpretation: sinus rhythm, no evidence of arrhythmia  ST Segments: Normal ST segments - NO STEMI      I independently interpreted the cardiac monitor rhythm strip no ischemia.  I interpreted the CT Scan no pneumonia.  RADIOLOGY DISCLAIMER: Although I do my best to interpret the imaging, I am not a board-certified radiologist.  I am still basing my medical decision making off of the board-certified radiology interpretation provided to me.    Discussion of management or test interpretation.  In summary this is a 78-year-old female patient presented for evaluation of dizziness and shortness of breath after being diagnosed with COVID-19.  On exam she was hypoxic with ambulation and became quite short of breath when walking around.  Otherwise her exam was overall benign.  Lungs were clear.  Lab work largely reassuring.  Did scan her chest to evaluate for pulmonary embolism and pneumonia which was negative.  Troponin negative.  BNP negative.  Due to the hypoxia on ambulation, will admit for further monitoring and treatment.  Hospitalist consulted for admission. Patient stable at time of admission.  Patient history, ROS, physical exam, labs, radiological  evidence of pulmonary embolism.      Few scattered bilateral upper lobe 3 mm benign-appearing nodules. 6-month   follow-up chest CT without contrast recommended.                           Voice dictation software was used during the making of this note.  This software is not perfect and grammatical and other typographical errors may be present.  This note has not been completely proofread for errors.     Hugo Powell PA  01/17/24 [id number]

## 2024-01-17 NOTE — ACP (ADVANCE CARE PLANNING)
Advance Care Planning     Advance Care Planning Activator (Inpatient)  Conversation Note      Date of ACP Conversation: 1/17/2024     Conversation Conducted with: Patient with Decision Making Capacity    ACP Activator: Vanessa Cavazos, MSW        Health Care Decision Maker:     Current Designated Health Care Decision Maker:     Primary Decision Maker: Esha Bonilla - Child - [phone]  Click here to complete Healthcare Decision Makers including section of the Healthcare Decision Maker Relationship (ie \"Primary\")  Today we documented Decision Maker(s) consistent with Legal Next of Kin hierarchy.    Care Preferences    SW met with patient who states that she has HCPOA documents, advises that her daughter Esha is her primary decision maker and legal next-of-kin.     Length of ACP Conversation in minutes:  5    Conversation Outcomes:  ACP discussion completed    Follow-up plan:    [] Schedule follow-up conversation to continue planning  [x] Referred individual to Provider for additional questions/concerns   [] Advised patient/agent/surrogate to review completed ACP document and update if needed with changes in condition, patient preferences or care setting    [] This note routed to one or more involved healthcare providers

## 2024-01-17 NOTE — ED TRIAGE NOTES
Pt from home via ems with c/o \"feeling sick\", seen at Urgent Care 1/12 and tested positive for Covid.  Neg fever/chills,  admits to a productive cough and nausea without vomiting.  VSS

## 2024-01-17 NOTE — H&P
Hospitalist History and Physical   Admit Date:  2024  9:30 AM   Name:  Fanny Cho   Age:  78 y.o.  Sex:  female  :  1945   MRN:  [id number]   Room:      Presenting Complaint: Positive For Covid-19 and Dizziness     Reason(s) for Admission: Acute respiratory failure with hypoxia (HCC) [J96.01]     History of Present Illness:   Fanny Cho is a 78 y.o. female with medical history of GERD, hypertension, hyperlipidemia, gout, COVID in , and was just diagnosed with COVID again at urgent care on 2024.  Patient was doing well up until the past 24 hours when she began to feel more short of breath and to have cough/congestion.  CT exam in the emergency room was negative for PE and pneumonia but did show a few scattered small pulmonary nodules that they have been watching on outpatient CAT scan every 6 months; no change at this time.  Patient afebrile.  White cell count 4.2.  Lactic acid and procalcitonin are pending.  BNP is negative.  Magnesium level is within normal limits.  Plan was to discharge patient home with supportive care but she became hypoxic when ambulating in the emergency room; O2 down to 81% on room air.  Patient does not require O2 at home, inhalers or breathing treatments, on a regular basis. She denies any smoking hx. Her EKG is normal sinus rhythm at 60 bpm.  Hospital team was asked to admit patient for acute respiratory failure with hypoxia secondary to COVID-19. Plan of care was d/w daughter at bedside and attending Dr Patel.     Assessment & Plan:     Principal Problem:    Acute respiratory failure with hypoxia (HCC)  COVID-19   Plan: Tested covid positive since 24 at urgent care  Had COVID back in  as well  No known underlying lung disease although there is some small nodules they have been watching every 6 months on CAT scan  CT here negative for PE and pneumonia  Afebrile  Patient became hypoxic with activity in the ED, down to 81% w

## 2024-01-17 NOTE — ED NOTES
Pt unable to ambulate due to dizziness, taken to restroom via wheelchair with oximeter attached, while moving around in restroom O2 dropped to 81%. Per patient she started feeling dizzy this morning after waking. Seen by Doug SHELLEY

## 2024-01-17 NOTE — ED NOTES
TRANSFER - OUT REPORT:    Verbal report given to Cat FRAGOSO on Fanny Cho  being transferred to [facility] for routine progression of patient care       Report consisted of patient's Situation, Background, Assessment and   Recommendations(SBAR).     Information from the following report(s) ED SBAR, MAR, and Recent Results was reviewed with the receiving nurse.    Lines:   Peripheral IV 01/17/24 Right;Anterior Antecubital (Active)   Site Assessment Clean, dry & intact 01/17/24 1020   Line Status Blood return noted 01/17/24 1020   Line Care Line pulled back 01/17/24 1020   Phlebitis Assessment No symptoms 01/17/24 1020   Infiltration Assessment 0 01/17/24 1020   Dressing Status New dressing applied;Clean, dry & intact 01/17/24 1020   Dressing Type Transparent 01/17/24 1020        Opportunity for questions and clarification was provided.      Patient transported with:  Tech

## 2024-01-17 NOTE — ACP (ADVANCE CARE PLANNING)
[facility]  At the heart of better care     Advance Care Planning   Admit Date:  2024  9:30 AM   Name:  Fanny Cho   Age:  78 y.o.  Sex:  female  :  1945   MRN:  [id number]   Room:  [facility]    Fanny Cho is able to make her own decisions:   Yes    If pt unable to make decisions, POA/surrogate decision maker:  Daughter - Camila \"Esha\"  Cho    Other people present:   None    Patient / surrogate decision-maker directed code status:  Full Code    Other ACP topics discussed, if applicable:   None    Patient or surrogate consented to discussion of the current conditions, workup, management plans, prognosis, and the risk for further deterioration.  Time spent: 10 minutes in direct discussion.      Signed:  VANDANA Polo - TYRON

## 2024-01-17 NOTE — PROGRESS NOTES
Pt refused for wound care to address her LLE wound.  The pt requested her daughter bring their own supplies and take care of the dressing change on their own.

## 2024-01-18 VITALS
RESPIRATION RATE: 14 BRPM | SYSTOLIC BLOOD PRESSURE: 113 MMHG | WEIGHT: 247 LBS | HEART RATE: 63 BPM | DIASTOLIC BLOOD PRESSURE: 65 MMHG | HEIGHT: 64 IN | TEMPERATURE: 97.7 F | BODY MASS INDEX: 42.17 KG/M2 | OXYGEN SATURATION: 99 %

## 2024-01-18 PROBLEM — N30.00 ACUTE CYSTITIS WITHOUT HEMATURIA: Status: ACTIVE | Noted: 2024-01-18

## 2024-01-18 LAB
ALBUMIN SERPL-MCNC: 3.1 G/DL (ref 3.2–4.6)
ALBUMIN/GLOB SERPL: 0.9 (ref 0.4–1.6)
ALP SERPL-CCNC: 78 U/L (ref 50–136)
ALT SERPL-CCNC: 13 U/L (ref 12–65)
ANION GAP SERPL CALC-SCNC: 3 MMOL/L (ref 2–11)
APPEARANCE UR: CLEAR
AST SERPL-CCNC: 15 U/L (ref 15–37)
BACTERIA URNS QL MICRO: ABNORMAL /HPF
BASOPHILS # BLD: 0 K/UL (ref 0–0.2)
BASOPHILS NFR BLD: 0 % (ref 0–2)
BILIRUB SERPL-MCNC: 0.5 MG/DL (ref 0.2–1.1)
BILIRUB UR QL: NEGATIVE
BUN SERPL-MCNC: 17 MG/DL (ref 8–23)
CALCIUM SERPL-MCNC: 8.6 MG/DL (ref 8.3–10.4)
CASTS URNS QL MICRO: ABNORMAL /LPF
CHLORIDE SERPL-SCNC: 108 MMOL/L (ref 103–113)
CO2 SERPL-SCNC: 27 MMOL/L (ref 21–32)
COLOR UR: ABNORMAL
CREAT SERPL-MCNC: 0.91 MG/DL (ref 0.6–1)
DIFFERENTIAL METHOD BLD: ABNORMAL
EOSINOPHIL # BLD: 0 K/UL (ref 0–0.8)
EOSINOPHIL NFR BLD: 0 % (ref 0.5–7.8)
EPI CELLS #/AREA URNS HPF: ABNORMAL /HPF
ERYTHROCYTE [DISTWIDTH] IN BLOOD BY AUTOMATED COUNT: 13.6 % (ref 11.9–14.6)
FERRITIN SERPL-MCNC: 103 NG/ML (ref 8–388)
GLOBULIN SER CALC-MCNC: 3.3 G/DL (ref 2.8–4.5)
GLUCOSE SERPL-MCNC: 130 MG/DL (ref 65–100)
GLUCOSE UR STRIP.AUTO-MCNC: NEGATIVE MG/DL
HCT VFR BLD AUTO: 36.2 % (ref 35.8–46.3)
HGB BLD-MCNC: 11.6 G/DL (ref 11.7–15.4)
HGB UR QL STRIP: NEGATIVE
IMM GRANULOCYTES # BLD AUTO: 0 K/UL (ref 0–0.5)
IMM GRANULOCYTES NFR BLD AUTO: 0 % (ref 0–5)
KETONES UR QL STRIP.AUTO: NEGATIVE MG/DL
LACTATE SERPL-SCNC: 1 MMOL/L (ref 0.4–2)
LEUKOCYTE ESTERASE UR QL STRIP.AUTO: NEGATIVE
LYMPHOCYTES # BLD: 1.1 K/UL (ref 0.5–4.6)
LYMPHOCYTES NFR BLD: 28 % (ref 13–44)
MCH RBC QN AUTO: 30.1 PG (ref 26.1–32.9)
MCHC RBC AUTO-ENTMCNC: 32 G/DL (ref 31.4–35)
MCV RBC AUTO: 93.8 FL (ref 82–102)
MONOCYTES # BLD: 0.3 K/UL (ref 0.1–1.3)
MONOCYTES NFR BLD: 7 % (ref 4–12)
NEUTS SEG # BLD: 2.7 K/UL (ref 1.7–8.2)
NEUTS SEG NFR BLD: 65 % (ref 43–78)
NITRITE UR QL STRIP.AUTO: NEGATIVE
NRBC # BLD: 0 K/UL (ref 0–0.2)
PH UR STRIP: 6 (ref 5–9)
PLATELET # BLD AUTO: 155 K/UL (ref 150–450)
PMV BLD AUTO: 10 FL (ref 9.4–12.3)
POTASSIUM SERPL-SCNC: 4.1 MMOL/L (ref 3.5–5.1)
PROCALCITONIN SERPL-MCNC: <0.05 NG/ML (ref 0–0.49)
PROT SERPL-MCNC: 6.4 G/DL (ref 6.3–8.2)
PROT UR STRIP-MCNC: ABNORMAL MG/DL
RBC # BLD AUTO: 3.86 M/UL (ref 4.05–5.2)
RBC #/AREA URNS HPF: ABNORMAL /HPF
SODIUM SERPL-SCNC: 138 MMOL/L (ref 136–146)
SP GR UR REFRACTOMETRY: >1.035 (ref 1–1.02)
UROBILINOGEN UR QL STRIP.AUTO: 0.2 EU/DL (ref 0.2–1)
WBC # BLD AUTO: 4.1 K/UL (ref 4.3–11.1)
WBC URNS QL MICRO: ABNORMAL /HPF

## 2024-01-18 PROCEDURE — 96372 THER/PROPH/DIAG INJ SC/IM: CPT

## 2024-01-18 PROCEDURE — 83605 ASSAY OF LACTIC ACID: CPT

## 2024-01-18 PROCEDURE — 84145 PROCALCITONIN (PCT): CPT

## 2024-01-18 PROCEDURE — 85025 COMPLETE CBC W/AUTO DIFF WBC: CPT

## 2024-01-18 PROCEDURE — 97530 THERAPEUTIC ACTIVITIES: CPT

## 2024-01-18 PROCEDURE — 94664 DEMO&/EVAL PT USE INHALER: CPT

## 2024-01-18 PROCEDURE — 82728 ASSAY OF FERRITIN: CPT

## 2024-01-18 PROCEDURE — 97161 PT EVAL LOW COMPLEX 20 MIN: CPT

## 2024-01-18 PROCEDURE — 2580000003 HC RX 258: Performed by: NURSE PRACTITIONER

## 2024-01-18 PROCEDURE — 36415 COLL VENOUS BLD VENIPUNCTURE: CPT

## 2024-01-18 PROCEDURE — 81001 URINALYSIS AUTO W/SCOPE: CPT

## 2024-01-18 PROCEDURE — 6370000000 HC RX 637 (ALT 250 FOR IP): Performed by: NURSE PRACTITIONER

## 2024-01-18 PROCEDURE — 94761 N-INVAS EAR/PLS OXIMETRY MLT: CPT

## 2024-01-18 PROCEDURE — 80053 COMPREHEN METABOLIC PANEL: CPT

## 2024-01-18 PROCEDURE — 94640 AIRWAY INHALATION TREATMENT: CPT

## 2024-01-18 PROCEDURE — 6360000002 HC RX W HCPCS: Performed by: NURSE PRACTITIONER

## 2024-01-18 RX ORDER — NITROFURANTOIN MACROCRYSTALS 100 MG/1
100 CAPSULE ORAL EVERY 12 HOURS SCHEDULED
Qty: 14 CAPSULE | Refills: 0 | Status: SHIPPED | OUTPATIENT
Start: 2024-01-18 | End: 2024-01-25

## 2024-01-18 RX ORDER — GUAIFENESIN 600 MG/1
600 TABLET, EXTENDED RELEASE ORAL 2 TIMES DAILY
Qty: 10 TABLET | Refills: 0 | Status: SHIPPED | OUTPATIENT
Start: 2024-01-18 | End: 2024-01-23

## 2024-01-18 RX ORDER — BENZONATATE 100 MG/1
100 CAPSULE ORAL 3 TIMES DAILY PRN
Qty: 21 CAPSULE | Refills: 0 | Status: SHIPPED | OUTPATIENT
Start: 2024-01-18 | End: 2024-01-25

## 2024-01-18 RX ORDER — DEXAMETHASONE 6 MG/1
6 TABLET ORAL DAILY
Qty: 9 TABLET | Refills: 0 | Status: SHIPPED | OUTPATIENT
Start: 2024-01-19 | End: 2024-01-28

## 2024-01-18 RX ORDER — ALBUTEROL SULFATE 90 UG/1
2 AEROSOL, METERED RESPIRATORY (INHALATION) 4 TIMES DAILY PRN
Qty: 18 G | Refills: 0 | Status: SHIPPED | OUTPATIENT
Start: 2024-01-18 | End: 2024-02-17

## 2024-01-18 RX ORDER — NITROFURANTOIN MACROCRYSTALS 50 MG/1
100 CAPSULE ORAL EVERY 12 HOURS SCHEDULED
Status: DISCONTINUED | OUTPATIENT
Start: 2024-01-18 | End: 2024-01-18 | Stop reason: HOSPADM

## 2024-01-18 RX ADMIN — GUAIFENESIN 600 MG: 600 TABLET, EXTENDED RELEASE ORAL at 08:14

## 2024-01-18 RX ADMIN — OXYCODONE HYDROCHLORIDE AND ACETAMINOPHEN 500 MG: 500 TABLET ORAL at 08:14

## 2024-01-18 RX ADMIN — PANTOPRAZOLE SODIUM 40 MG: 40 TABLET, DELAYED RELEASE ORAL at 05:40

## 2024-01-18 RX ADMIN — ACETAMINOPHEN 650 MG: 325 TABLET, FILM COATED ORAL at 05:42

## 2024-01-18 RX ADMIN — SODIUM CHLORIDE: 9 INJECTION, SOLUTION INTRAVENOUS at 05:44

## 2024-01-18 RX ADMIN — SODIUM CHLORIDE, PRESERVATIVE FREE 10 ML: 5 INJECTION INTRAVENOUS at 08:14

## 2024-01-18 RX ADMIN — ALLOPURINOL 300 MG: 300 TABLET ORAL at 08:14

## 2024-01-18 RX ADMIN — ENOXAPARIN SODIUM 30 MG: 100 INJECTION SUBCUTANEOUS at 05:40

## 2024-01-18 RX ADMIN — NITROFURANTOIN MACROCRYSTALS 100 MG: 50 CAPSULE ORAL at 11:00

## 2024-01-18 RX ADMIN — DEXAMETHASONE 6 MG: 4 TABLET ORAL at 08:14

## 2024-01-18 RX ADMIN — IPRATROPIUM BROMIDE 0.5 MG: 0.5 SOLUTION RESPIRATORY (INHALATION) at 08:47

## 2024-01-18 ASSESSMENT — PAIN DESCRIPTION - LOCATION: LOCATION: HEAD

## 2024-01-18 ASSESSMENT — PAIN SCALES - GENERAL: PAINLEVEL_OUTOF10: 2

## 2024-01-18 NOTE — PROGRESS NOTES
ACUTE PHYSICAL THERAPY GOALS:   (Developed with and agreed upon by patient and/or caregiver.)  STG:  (1.)Ms. Cho will move from supine to sit and sit to supine  with SUPERVISION within 4-7 treatment day(s).    (2.)Ms. Cho will transfer from bed to chair and chair to bed with SUPERVISION using the least restrictive device within 4-7 treatment day(s).    (3.)Ms. Cho will ambulate with MODIFIED INDEPENDENCE for 100 feet with the least restrictive device keeping sats above 90% within 4-7 treatment day(s).   (4.)Ms. Cho will participate in lower extremity exercises to increase strength and endurance to assist with mobility within 4-7 treatment days.     ________________________________________________________________________________________________     PHYSICAL THERAPY Initial Assessment and AM  (Link to Caseload Tracking: PT Visit Days : 1  Acknowledge Orders  Time In/Out  PT Charge Capture  Rehab Caseload Tracker    Fanny Cho is a 78 y.o. female   PRIMARY DIAGNOSIS: Acute respiratory failure with hypoxia (HCC)  DE LA FUENTE (dyspnea on exertion) [R06.09]  Hypoxia [R09.02]  Acute respiratory failure with hypoxia (HCC) [J96.01]  COVID [U07.1]       Reason for Referral: Generalized Muscle Weakness (M62.81)  Difficulty in walking, Not elsewhere classified (R26.2)  Inpatient: Payor: [facility] MEDICARE / Plan: [employer] MEDICARE ADVANTAGE HMO / Product Type: Medicare /     ASSESSMENT:     REHAB RECOMMENDATIONS:   Recommendation to date pending progress:  Setting:  Home Health Therapy     Equipment:     Pt has a rollator, she talked about maybe needing a shower chair     ASSESSMENT:  Ms. Cho is a pleasant 78 year old female admitted with above diagnosis. She came to hospital with shortness of breath and dizziness impairing her ambulation. She lives at home home where at baseline she was independent with mobility with a rollator and independent with ADLs. Her daughter is an outpatient PTA. Pt had a fall around

## 2024-01-18 NOTE — DISCHARGE SUMMARY
Hospitalist Discharge Summary   Admit Date:  2024  9:30 AM   DC Note date: 2024  Name:  Fanny Cho   Age:  78 y.o.  Sex:  female  :  1945   MRN:  [id number]   Room:  [facility]  PCP:  Thor Alonzo PA    Presenting Complaint: Positive For Covid-19 and Dizziness     Initial Admission Diagnosis: DE LA FUENTE (dyspnea on exertion) [R06.09]  Hypoxia [R09.02]  Acute respiratory failure with hypoxia (HCC) [J96.01]  COVID [U07.1]     Problem List for this Hospitalization (present on admission):    Principal Problem:    Acute respiratory failure with hypoxia (HCC)  Active Problems:    Dyslipidemia    Iron deficiency anemia    Hypertension    IBS (irritable bowel syndrome)    COVID-19    Acute cystitis without hematuria  Resolved Problems:    * No resolved hospital problems. *      Hospital Course:  Fanny Cho is a 78 y.o. female with medical history of GERD, hypertension, hyperlipidemia, gout, COVID in , and was just diagnosed with COVID again at urgent care on 2024.  Patient was doing well up until the past 24 hours when she began to feel more short of breath and to have cough/congestion.  CT exam in the emergency room was negative for PE and pneumonia but did show a few scattered small pulmonary nodules that they have been watching on outpatient CAT scan every 6 months; no change at this time.  Patient afebrile.  White cell count 4.2.  Lactic acid and procalcitonin are pending.  BNP is negative.  Magnesium level is within normal limits.  Plan was to discharge patient home with supportive care but she became hypoxic when ambulating in the emergency room; O2 down to 81% on room air.  Patient does not require O2 at home, inhalers or breathing treatments, on a regular basis. She denies any smoking hx. Her EKG is normal sinus rhythm at 60 bpm.  Hospital team was asked to admit patient for acute respiratory failure with hypoxia secondary to COVID-19. Plan of care was d/w daughter at bedside and  attending Dr Patel.     ROUNDS 1/18/2024 - Day of Discharge:  Pt up in room, ambulated w RT and did NOT desat. Has not needed O2 since arrival. Afebrile. WBC ct this am 4.1. LA and PCT levels normal. Ferritin unremarkable. Trop / BNP - both neg. Cr 0.91. Electrolytes stable.     Pt has improved faster overnight than expected, w rest, steroids, mucinex, breathing tx and wants to DC home. There is nothing we are doing here she cannot do at home. Will DC on mucinex, steroids and w a HFA inhaler, to be used if she gets wheezing or SOB.     In addition, pts UA had bacteria present w casts, and w some urinary sx. Will treat w 7 day course of macrobid upon DC. Stay hydrated. Pt aware and agress to tx.     Pt is stable for DC today, dtr will  and take home. Rxs sent to [facility] [address].       Disposition: home w daughter today  Diet: ADULT DIET; Regular  Code Status: Full Code    Follow Ups:   Follow-up Information     Thor Alonzo PA. Schedule an appointment as soon as possible for a visit   in 1 week(s).    Specialty: Physician Assistant  Contact information:  [address]  [address] [address] [phone]                       Time spent in patient discharge and coordination 32 minutes.        Follow up labs/diagnostics (ultimately defer to outpatient provider):  CBC,Mag, BMP -  1 wk w PCP    Plan was discussed with pt, rn, cms, attending Dr Cee.  All questions answered.  Patient was stable at time of discharge.  Instructions given to call a physician or return if any concerns.    Current Discharge Medication List        START taking these medications    Details   dexAMETHasone (DECADRON) 6 MG tablet Take 1 tablet by mouth daily for 9 doses  Qty: 9 tablet, Refills: 0      benzonatate (TESSALON) 100 MG capsule Take 1 capsule by mouth 3 times daily as needed for Cough  Qty: 21 capsule, Refills: 0      guaiFENesin (MUCINEX) 600 MG extended release tablet Take 1 tablet by mouth 2 times daily

## 2024-01-18 NOTE — PROGRESS NOTES
01/18/24 0925   RT Protocol   History Pulmonary Disease 0   Respiratory pattern 0   Breath sounds 2   Cough 0   Indications for Bronchodilator Therapy Decreased or absent breath sounds   Bronchodilator Assessment Score 2           Respiratory Care Services Policy Number: [id number]    Title: Aerosolized Medication Protocol    Effective Date: 10/1998    Revised Date: 06/13, 03/16, 11/17, 07/19     Reviewed Date: 05/14/ 03/15 , 06/17, 5/18, 11/2020   I.  Policy: The Aerosolized Medication Protocol shall by implemented by Respiratory Care Practitioners (RCP) for patients with orders to receive aerosol therapy with medication.     II. Purpose:  To open and maintain obstructed airways, the RCP, will utilize the following   protocol to select the indicated aerosolized medication(s) and determine the most effective method of delivery to the patient.    III. Patient Type: All patients who are determined to meet aerosolized medication criteria as          outlined in this protocol.    IV. Responsibility: Director, Respiratory Care Services, registered Respiratory Care Practitioners (RCP's) with documented competency in the performance of                                     respiratory therapeutic techniques.    V. Equipment needed:  Stethoscope  Pulse oximeter  AeroEclipse nebulizer  Meter Dose Inhaler (MDI)    VI. Protocol:   The following conditions are accepted indications for aerosolized medication therapy.   Bronchospasm/wheezing  Impaired mucociliary clearance  Tracheobronchial mucosal congestion/and laryngeal stridor  Diseases which commonly require aerosolized medication therapy include, but are not limited to:  Asthma/reactive airway disease  Bronchitis/emphysema (COPD)  Cystic fibrosis  Severe laryngitis/tracheitis  Bronchiectasis  Smoke inhalation or chemical trauma to the lung or upper airway  Physical trauma to the upper airway  Laryngotracheobronchitis  Bronchiolitis  Non-specific wheezing              B.  Indications for bronchodilator medications will include:  Bronchospasm/ wheezing  Asthma/reactive airway disease  Chronic obstructive pulmonary disease  Obstructive defect on pulmonary function testing  Administration of medications  If a bronchodilator or any other type of respiratory medication is needed, a physician order must be indicated in the medication section in the patient's EMR.   When the physician specifies the medication and dosage at the time of request, the ordered medication will be used as part of the care plan.  The following guidelines will be utilized in the evaluation and selection of the appropriate delivery device for indicated medication(s):  MDI is the preferred method of medication delivery via common canister.  Patient should be alert/cooperative  Able to perform 3 second breath hold.  Patient may be changed to self-administer as appropriate.   Patients in isolation will be provided an individual inhaler.  Unassisted aerosol (UA) is indicated if  Ventilation is inadequate  Patient is unable to perform MDI appropriately     VII. Guidelines:   Monitor patient's vital signs and evaluate patient's clinical status. The need to change medication and/or modality may be indicated by:  A pulse greater than 120 bpm, or if a pulse increase of 20 bpm occurs with bronchodilator medications.  Significant worsening of dyspnea or wheezing occurring during or within 30 minutes of discontinuing therapy.  Worsening of patient's sensorium (e.g. patient becomes confused or obtunded, and unable to follow directions).  Worsening of patient's chest x-ray.  Change in sputum (e.g. increased pulmonary infiltrate, which might indicate need for volume expansion therapy).  Patient has difficulty coughing up secretions, which might indicate need for acetylcysteine and/or bronchial hygiene therapy.  Call physician immediately if dyspnea worsens and is not responsive to modifications allowed by protocol.    VIII.

## 2024-01-18 NOTE — PROGRESS NOTES
survival, pulmonary hemodynamics, exercise capacity and neuropsychological performance.1    Responsibility: Director of Respiratory Care Services and Respiratory Care Practitioners.     Home Oxygen Diagnostic Requirements:   Covered health conditions:    Severe primary lung disease   Chronic obstructive pulmonary disease  Diffuse interstitial lung disease  Cystic fibrosis  Bronchiectasis  Pulmonary neoplasm, primary or metastatic  Chronic bronchitis  Emphysema  Hypoxia-related symptoms or conditions that may improve with oxygen therapy such as  Pulmonary hypertension  Recurring congestive heart failure due to chronic cor pulmonale  Erythocytosis/erythrocythemia  Qualifying testing requirements  Testing must be performed within two days prior to discharge.  Test results must be documented in patient's medical record in approved format.   Testing can be done under three conditions  A test during rest.   Oxygen is considered medically necessary if SpO2 is less than or equal to 88%.  If SpO2 is greater than 88%, proceed to step 2.  A test taken on room air during exercise  If patient meets qualifying threshold of SpO2 less than or equal to 88% while exercising, all three of the required tests below must be performed within same testing session and be recorded in the patient's medical record.  A test taken during rest while patient breathes room air.  A test during exercise while patient breathes room air.  A test taken during exercise with oxygen applied. (to demonstrate improvement of hypoxia).  A test taken during sleep.   If patient is tested during sleep, test must have at least two hours of recorded time.  Test must indicate arterial oxygen saturation of 88% or less for at least 5 minutes of   testing period.  A patient tested during sleep will not qualify for portable oxygen.  A physician order will be required for an overnight oximetry test.  Regardless of test condition, the following values apply to all:  Group  understanding.  Assessment  Assure that patient is on room air prior to test.  Fingernail polish if worn by patient must be removed before testing.   Ask patient to sit in an upright position.  Resting SaO2 assessment   Perform a 30 second resting room air SaO2.   If SaO2 is 88% or less Medicare considers oxygen is medically necessary   Document findings in patient EMR.    If SaO2 is greater than 88%, proceed to the next step.  Ambulatory Room Air SaO2 check  Ask patient to walk for 5 minutes on room air or as long as tolerated.  If patients ordinarily use a cane, walker or rollator, they should be used during test.  Instruct patient to walk at a comfortable pace and to breathe naturally during test.  Monitor and record patient's heart rate, SaO2 and exercise endurance.  If SaO2 is 88% or less post exercise, an ambulatory test on oxygen must be performed.  Ambulatory SaO2 on Oxygen  The P shall place the patient on oxygen to achieve a SaO2 of 90%.  Instruct patient to walk for 5 minutes or as long as tolerated.  If patients ordinarily use a cane, walker or rollator, they should be used during test  Monitor and record patient's heart rate, SaO2 and exercise endurance.  If Sa02 decreases to the range of 80% to 84% the flow shall be increased by 2LPM.   If SaO2 decreases to the range of 85%-89% increased oxygen by 1 LPM.  If SaO2 is less than 80%, the patient is not appropriate for ambulation.    Documentation   Medicare has specific guidelines for documentation of ambulatory oxygen saturation testing, therefore all test results must be documented in the patient's EMR as outlined below.     Room air: SpO2 with O2 and liter flow   Resting SpO2  99%  N/A   Ambulating SpO2  95% RESULTS: N/A      While ambulating patient required no supplemental oxygen to maintain sat above protocol.      Reference:       CMS Center for Medicare & Medicaid Services. Home Oxygen Therapy. Medicare        Part B- Oxygen Coverage

## 2024-01-18 NOTE — PROGRESS NOTES
PT is Covid Positive. CH prayed silently for PT, PT's Family, and Staff as chart states PT is Pentec[employer]al.     Rev. SAY GarcesDiv.

## 2024-01-18 NOTE — PROGRESS NOTES
Discharge instructions were reviewed with the patient.  Opportunity for questions given.  Patient verbalized understanding with discharge and follow up instructions, as well as signs and symptoms to report to MD or to return to ER for.  PIV was removed, Rx's were provided.  Pt will D/c to home.

## 2024-01-19 ENCOUNTER — CARE COORDINATION (OUTPATIENT)
Dept: CARE COORDINATION | Facility: CLINIC | Age: 79
End: 2024-01-19

## 2024-01-19 NOTE — CARE COORDINATION
Care Transitions Outreach Attempt    Call within 2 business days of discharge: Yes   Attempted to reach patient for transitions of care follow up. Unable to reach patient.    Patient: Fanny Cho Patient : 1945 MRN: [id number]    Last Discharge Facility       Date Complaint Diagnosis Description Type Department Provider    24 Positive For Covid-19; Dizziness COVID ... ED to Hosp-Admission (Discharged) (ADMITTED) [facility] Scott Cee MD; Dayan...              Was this an external facility discharge? No Discharge Facility Name: [facility]    Noted following upcoming appointments from discharge chart review:   [facility] follow up appointment(s):   Future Appointments   Date Time Provider Department Center   2024  9:30 AM Thor Alonzo PA [facility] AMB   2024  1:00 PM Thor Alonzo PA [facility] AMB     Non-[facility]  follow up appointment(s): [facility]-3/20/2024

## 2024-01-19 NOTE — CARE COORDINATION
Pt is for discharge home today with family and no needs/supportive care orders recieved for CM at this time.        01/18/24 2002   Services At/After Discharge   Transition of Care Consult (CM Consult) N/A   Services At/After Discharge None    Resource Information Provided? No   Mode of Transport at Discharge Other (see comment)  (family)   Confirm Follow Up Transport Family   Condition of Participation: Discharge Planning   The Plan for Transition of Care is related to the following treatment goals: Pt to return home with family.   The Patient and/or Patient Representative was provided with a Choice of Provider? Patient   The Patient and/Or Patient Representative agree with the Discharge Plan? Yes   Freedom of Choice list was provided with basic dialogue that supports the patient's individualized plan of care/goals, treatment preferences, and shares the quality data associated with the providers?  Yes        fall precautions

## 2024-01-22 ENCOUNTER — CARE COORDINATION (OUTPATIENT)
Dept: CARE COORDINATION | Facility: CLINIC | Age: 79
End: 2024-01-22

## 2024-01-22 NOTE — CARE COORDINATION
Date/Time:  1/22/2024 12:33 PM  Attempted to reach patient by telephone. Call within 2 business days of discharge: Yes Left another HIPPA compliant message requesting a return call. Will close at this time due to no return call.

## 2024-01-24 ENCOUNTER — OFFICE VISIT (OUTPATIENT)
Dept: INTERNAL MEDICINE CLINIC | Facility: CLINIC | Age: 79
End: 2024-01-24

## 2024-01-24 VITALS
BODY MASS INDEX: 40.29 KG/M2 | DIASTOLIC BLOOD PRESSURE: 72 MMHG | OXYGEN SATURATION: 100 % | HEART RATE: 83 BPM | WEIGHT: 236 LBS | HEIGHT: 64 IN | SYSTOLIC BLOOD PRESSURE: 114 MMHG

## 2024-01-24 DIAGNOSIS — Z09 HOSPITAL DISCHARGE FOLLOW-UP: Primary | ICD-10-CM

## 2024-01-24 DIAGNOSIS — M10.9 GOUT, UNSPECIFIED CAUSE, UNSPECIFIED CHRONICITY, UNSPECIFIED SITE: ICD-10-CM

## 2024-01-24 DIAGNOSIS — E78.2 MIXED HYPERLIPIDEMIA: ICD-10-CM

## 2024-01-24 DIAGNOSIS — E55.9 VITAMIN D DEFICIENCY: ICD-10-CM

## 2024-01-24 DIAGNOSIS — M54.42 CHRONIC BILATERAL LOW BACK PAIN WITH LEFT-SIDED SCIATICA: ICD-10-CM

## 2024-01-24 DIAGNOSIS — G89.29 CHRONIC BILATERAL LOW BACK PAIN WITH LEFT-SIDED SCIATICA: ICD-10-CM

## 2024-01-24 DIAGNOSIS — K21.9 GASTROESOPHAGEAL REFLUX DISEASE WITHOUT ESOPHAGITIS: ICD-10-CM

## 2024-01-24 DIAGNOSIS — R73.01 IMPAIRED FASTING BLOOD SUGAR: ICD-10-CM

## 2024-01-24 LAB
25(OH)D3 SERPL-MCNC: 51 NG/ML (ref 30–100)
ALBUMIN SERPL-MCNC: 3.8 G/DL (ref 3.2–4.6)
ALBUMIN/GLOB SERPL: 1.5 (ref 0.4–1.6)
ALP SERPL-CCNC: 75 U/L (ref 50–136)
ALT SERPL-CCNC: 22 U/L (ref 12–65)
ANION GAP SERPL CALC-SCNC: 4 MMOL/L (ref 2–11)
AST SERPL-CCNC: 12 U/L (ref 15–37)
BASOPHILS # BLD: 0 K/UL (ref 0–0.2)
BASOPHILS NFR BLD: 0 % (ref 0–2)
BILIRUB SERPL-MCNC: 0.9 MG/DL (ref 0.2–1.1)
BUN SERPL-MCNC: 26 MG/DL (ref 8–23)
CALCIUM SERPL-MCNC: 9.2 MG/DL (ref 8.3–10.4)
CHLORIDE SERPL-SCNC: 105 MMOL/L (ref 103–113)
CHOLEST SERPL-MCNC: 171 MG/DL
CO2 SERPL-SCNC: 26 MMOL/L (ref 21–32)
CREAT SERPL-MCNC: 1.1 MG/DL (ref 0.6–1)
DIFFERENTIAL METHOD BLD: ABNORMAL
EOSINOPHIL # BLD: 0 K/UL (ref 0–0.8)
EOSINOPHIL NFR BLD: 0 % (ref 0.5–7.8)
ERYTHROCYTE [DISTWIDTH] IN BLOOD BY AUTOMATED COUNT: 13.7 % (ref 11.9–14.6)
EST. AVERAGE GLUCOSE BLD GHB EST-MCNC: 114 MG/DL
GLOBULIN SER CALC-MCNC: 2.6 G/DL (ref 2.8–4.5)
GLUCOSE SERPL-MCNC: 101 MG/DL (ref 65–100)
HBA1C MFR BLD: 5.6 % (ref 4.8–5.6)
HCT VFR BLD AUTO: 41.6 % (ref 35.8–46.3)
HDLC SERPL-MCNC: 44 MG/DL (ref 40–60)
HDLC SERPL: 3.9
HGB BLD-MCNC: 13.5 G/DL (ref 11.7–15.4)
IMM GRANULOCYTES # BLD AUTO: 0.1 K/UL (ref 0–0.5)
IMM GRANULOCYTES NFR BLD AUTO: 1 % (ref 0–5)
LDLC SERPL CALC-MCNC: 72.8 MG/DL
LYMPHOCYTES # BLD: 1.6 K/UL (ref 0.5–4.6)
LYMPHOCYTES NFR BLD: 17 % (ref 13–44)
MCH RBC QN AUTO: 30.3 PG (ref 26.1–32.9)
MCHC RBC AUTO-ENTMCNC: 32.5 G/DL (ref 31.4–35)
MCV RBC AUTO: 93.3 FL (ref 82–102)
MONOCYTES # BLD: 0.3 K/UL (ref 0.1–1.3)
MONOCYTES NFR BLD: 3 % (ref 4–12)
NEUTS SEG # BLD: 7.4 K/UL (ref 1.7–8.2)
NEUTS SEG NFR BLD: 79 % (ref 43–78)
NRBC # BLD: 0 K/UL (ref 0–0.2)
PLATELET # BLD AUTO: 272 K/UL (ref 150–450)
PMV BLD AUTO: 10.5 FL (ref 9.4–12.3)
POTASSIUM SERPL-SCNC: 4 MMOL/L (ref 3.5–5.1)
PROT SERPL-MCNC: 6.4 G/DL (ref 6.3–8.2)
RBC # BLD AUTO: 4.46 M/UL (ref 4.05–5.2)
SODIUM SERPL-SCNC: 135 MMOL/L (ref 136–146)
TRIGL SERPL-MCNC: 271 MG/DL (ref 35–150)
TSH, 3RD GENERATION: 0.94 UIU/ML (ref 0.36–3.74)
VLDLC SERPL CALC-MCNC: 54.2 MG/DL (ref 6–23)
WBC # BLD AUTO: 9.3 K/UL (ref 4.3–11.1)

## 2024-01-24 RX ORDER — OMEPRAZOLE 20 MG/1
20 CAPSULE, DELAYED RELEASE ORAL DAILY
Qty: 90 CAPSULE | Refills: 1 | Status: SHIPPED | OUTPATIENT
Start: 2024-01-24

## 2024-01-24 RX ORDER — ALLOPURINOL 300 MG/1
300 TABLET ORAL DAILY
Qty: 90 TABLET | Refills: 1 | Status: SHIPPED | OUTPATIENT
Start: 2024-01-24

## 2024-01-24 ASSESSMENT — PATIENT HEALTH QUESTIONNAIRE - PHQ9
SUM OF ALL RESPONSES TO PHQ9 QUESTIONS 1 & 2: 0
SUM OF ALL RESPONSES TO PHQ QUESTIONS 1-9: 0
2. FEELING DOWN, DEPRESSED OR HOPELESS: 0
1. LITTLE INTEREST OR PLEASURE IN DOING THINGS: 0

## 2024-01-24 ASSESSMENT — ENCOUNTER SYMPTOMS
NAUSEA: 0
COLOR CHANGE: 0
COUGH: 0
VOMITING: 0
DIARRHEA: 0
EYE PAIN: 0
SHORTNESS OF BREATH: 0
SORE THROAT: 0
CHEST TIGHTNESS: 0
VOICE CHANGE: 0
CONSTIPATION: 0
WHEEZING: 0
ABDOMINAL PAIN: 0

## 2024-01-24 NOTE — PROGRESS NOTES
PROGRESS NOTE    Chief Complaint   Patient presents with    Follow-Up from Hospital     Pt here for hospital f/u. In hospital for COVID    Fatigue     Still has increased fatigue         HPI  Hospital Course: 1/17/-18/204  Fanny Cho is a 78 y.o. female with medical history of GERD, hypertension, hyperlipidemia, gout, COVID in 2020, and was just diagnosed with COVID again at urgent care on 1/12/2024.  Patient was doing well up until the past 24 hours when she began to feel more short of breath and to have cough/congestion.  CT exam in the emergency room was negative for PE and pneumonia but did show a few scattered small pulmonary nodules that they have been watching on outpatient CAT scan every 6 months; no change at this time.  Patient afebrile.  White cell count 4.2.  Lactic acid and procalcitonin are pending.  BNP is negative.  Magnesium level is within normal limits.  Plan was to discharge patient home with supportive care but she became hypoxic when ambulating in the emergency room; O2 down to 81% on room air.  Patient does not require O2 at home, inhalers or breathing treatments, on a regular basis. She denies any smoking hx. Her EKG is normal sinus rhythm at 60 bpm.  Hospital team was asked to admit patient for acute respiratory failure with hypoxia secondary to COVID-19. Plan of care was d/w daughter at bedside and attending Dr Patel.      ROUNDS 1/18/2024 - Day of Discharge:  Pt up in room, ambulated w RT and did NOT desat. Has not needed O2 since arrival. Afebrile. WBC ct this am 4.1. LA and PCT levels normal. Ferritin unremarkable. Trop / BNP - both neg. Cr 0.91. Electrolytes stable.      Pt has improved faster overnight than expected, w rest, steroids, mucinex, breathing tx and wants to DC home. There is nothing we are doing here she cannot do at home. Will DC on mucinex, steroids and w a HFA inhaler, to be used if she gets wheezing or SOB.      In addition, pts UA had bacteria present w casts,

## 2024-01-25 DIAGNOSIS — M10.9 GOUT, UNSPECIFIED CAUSE, UNSPECIFIED CHRONICITY, UNSPECIFIED SITE: ICD-10-CM

## 2024-01-25 DIAGNOSIS — I10 PRIMARY HYPERTENSION: ICD-10-CM

## 2024-01-25 DIAGNOSIS — K21.9 GASTROESOPHAGEAL REFLUX DISEASE WITHOUT ESOPHAGITIS: ICD-10-CM

## 2024-01-25 RX ORDER — ALLOPURINOL 300 MG/1
300 TABLET ORAL DAILY
Qty: 90 TABLET | Refills: 1 | OUTPATIENT
Start: 2024-01-25

## 2024-01-25 RX ORDER — OMEPRAZOLE 20 MG/1
20 CAPSULE, DELAYED RELEASE ORAL DAILY
Qty: 90 CAPSULE | Refills: 1 | OUTPATIENT
Start: 2024-01-25

## 2024-01-25 RX ORDER — LISINOPRIL 20 MG/1
TABLET ORAL
Qty: 180 TABLET | Refills: 1 | OUTPATIENT
Start: 2024-01-25

## 2024-02-02 ENCOUNTER — OFFICE VISIT (OUTPATIENT)
Dept: INTERNAL MEDICINE CLINIC | Facility: CLINIC | Age: 79
End: 2024-02-02
Payer: MEDICARE

## 2024-02-02 VITALS
DIASTOLIC BLOOD PRESSURE: 84 MMHG | BODY MASS INDEX: 40.97 KG/M2 | WEIGHT: 240 LBS | HEIGHT: 64 IN | SYSTOLIC BLOOD PRESSURE: 126 MMHG

## 2024-02-02 DIAGNOSIS — Z11.59 SCREENING FOR VIRAL DISEASE: ICD-10-CM

## 2024-02-02 DIAGNOSIS — Z13.39 SCREENING FOR ALCOHOLISM: ICD-10-CM

## 2024-02-02 DIAGNOSIS — Z23 ENCOUNTER FOR IMMUNIZATION: ICD-10-CM

## 2024-02-02 DIAGNOSIS — Z00.00 MEDICARE ANNUAL WELLNESS VISIT, SUBSEQUENT: Primary | ICD-10-CM

## 2024-02-02 DIAGNOSIS — E86.0 DEHYDRATION: ICD-10-CM

## 2024-02-02 DIAGNOSIS — Z23 NEED FOR INFLUENZA VACCINATION: ICD-10-CM

## 2024-02-02 DIAGNOSIS — Z12.11 COLON CANCER SCREENING: ICD-10-CM

## 2024-02-02 DIAGNOSIS — Z13.31 SCREENING FOR DEPRESSION: ICD-10-CM

## 2024-02-02 DIAGNOSIS — Z78.0 MENOPAUSE: ICD-10-CM

## 2024-02-02 LAB
ALBUMIN SERPL-MCNC: 3.5 G/DL (ref 3.2–4.6)
ALBUMIN/GLOB SERPL: 1.2 (ref 0.4–1.6)
ALP SERPL-CCNC: 77 U/L (ref 50–136)
ALT SERPL-CCNC: 20 U/L (ref 12–65)
ANION GAP SERPL CALC-SCNC: 5 MMOL/L (ref 2–11)
AST SERPL-CCNC: 15 U/L (ref 15–37)
BASOPHILS # BLD: 0 K/UL (ref 0–0.2)
BASOPHILS NFR BLD: 0 % (ref 0–2)
BILIRUB SERPL-MCNC: 0.8 MG/DL (ref 0.2–1.1)
BUN SERPL-MCNC: 20 MG/DL (ref 8–23)
CALCIUM SERPL-MCNC: 9.2 MG/DL (ref 8.3–10.4)
CHLORIDE SERPL-SCNC: 106 MMOL/L (ref 103–113)
CO2 SERPL-SCNC: 27 MMOL/L (ref 21–32)
CREAT SERPL-MCNC: 0.9 MG/DL (ref 0.6–1)
DIFFERENTIAL METHOD BLD: ABNORMAL
EOSINOPHIL # BLD: 0.1 K/UL (ref 0–0.8)
EOSINOPHIL NFR BLD: 1 % (ref 0.5–7.8)
ERYTHROCYTE [DISTWIDTH] IN BLOOD BY AUTOMATED COUNT: 14.7 % (ref 11.9–14.6)
GLOBULIN SER CALC-MCNC: 2.9 G/DL (ref 2.8–4.5)
GLUCOSE SERPL-MCNC: 90 MG/DL (ref 65–100)
HCT VFR BLD AUTO: 38.7 % (ref 35.8–46.3)
HGB BLD-MCNC: 12.4 G/DL (ref 11.7–15.4)
IMM GRANULOCYTES # BLD AUTO: 0 K/UL (ref 0–0.5)
IMM GRANULOCYTES NFR BLD AUTO: 0 % (ref 0–5)
LYMPHOCYTES # BLD: 2.6 K/UL (ref 0.5–4.6)
LYMPHOCYTES NFR BLD: 27 % (ref 13–44)
MCH RBC QN AUTO: 30.5 PG (ref 26.1–32.9)
MCHC RBC AUTO-ENTMCNC: 32 G/DL (ref 31.4–35)
MCV RBC AUTO: 95.1 FL (ref 82–102)
MONOCYTES # BLD: 0.8 K/UL (ref 0.1–1.3)
MONOCYTES NFR BLD: 8 % (ref 4–12)
NEUTS SEG # BLD: 6 K/UL (ref 1.7–8.2)
NEUTS SEG NFR BLD: 64 % (ref 43–78)
NRBC # BLD: 0 K/UL (ref 0–0.2)
PLATELET # BLD AUTO: 174 K/UL (ref 150–450)
PMV BLD AUTO: 10.7 FL (ref 9.4–12.3)
POTASSIUM SERPL-SCNC: 4.8 MMOL/L (ref 3.5–5.1)
PROT SERPL-MCNC: 6.4 G/DL (ref 6.3–8.2)
RBC # BLD AUTO: 4.07 M/UL (ref 4.05–5.2)
SODIUM SERPL-SCNC: 138 MMOL/L (ref 136–146)
WBC # BLD AUTO: 9.6 K/UL (ref 4.3–11.1)

## 2024-02-02 PROCEDURE — G0439 PPPS, SUBSEQ VISIT: HCPCS | Performed by: PHYSICIAN ASSISTANT

## 2024-02-02 PROCEDURE — 3074F SYST BP LT 130 MM HG: CPT | Performed by: PHYSICIAN ASSISTANT

## 2024-02-02 PROCEDURE — 1123F ACP DISCUSS/DSCN MKR DOCD: CPT | Performed by: PHYSICIAN ASSISTANT

## 2024-02-02 PROCEDURE — 3079F DIAST BP 80-89 MM HG: CPT | Performed by: PHYSICIAN ASSISTANT

## 2024-02-02 PROCEDURE — 99213 OFFICE O/P EST LOW 20 MIN: CPT | Performed by: PHYSICIAN ASSISTANT

## 2024-02-02 ASSESSMENT — PATIENT HEALTH QUESTIONNAIRE - PHQ9
SUM OF ALL RESPONSES TO PHQ QUESTIONS 1-9: 0
SUM OF ALL RESPONSES TO PHQ QUESTIONS 1-9: 0
2. FEELING DOWN, DEPRESSED OR HOPELESS: 0
SUM OF ALL RESPONSES TO PHQ QUESTIONS 1-9: 0
1. LITTLE INTEREST OR PLEASURE IN DOING THINGS: 0
SUM OF ALL RESPONSES TO PHQ QUESTIONS 1-9: 0
SUM OF ALL RESPONSES TO PHQ9 QUESTIONS 1 & 2: 0

## 2024-02-02 NOTE — PROGRESS NOTES
evaluation and treatment    Advanced Directives:  Do you have a Living Will?: (!) No    Intervention:                       Objective   Vitals:    02/02/24 1307   BP: 126/84   Site: Left Upper Arm   Position: Sitting   Cuff Size: Large Adult   Weight: 108.9 kg (240 lb)   Height: 1.626 m (5' 4\")      Body mass index is 41.2 kg/m².               Allergies   Allergen Reactions    Hydromorphone Other (See Comments)     Difficulty breathing in recovery room, pt requests no additional use    Adhesive Tape Rash and Other (See Comments)     Only after prolonged periods of time.       Cephalexin Rash    Clindamycin Rash     Prior to Visit Medications    Medication Sig Taking? Authorizing Provider   allopurinol (ZYLOPRIM) 300 MG tablet Take 1 tablet by mouth daily  Thor Alonzo PA   omeprazole (PRILOSEC) 20 MG delayed release capsule Take 1 capsule by mouth Daily  Thor Alonzo PA   albuterol sulfate HFA (VENTOLIN HFA) 108 (90 Base) MCG/ACT inhaler Inhale 2 puffs into the lungs 4 times daily as needed for Wheezing  Mayra Wright, APRN - CNP   rosuvastatin (CRESTOR) 5 MG tablet Take 1 tablet by mouth every evening  Thor Alonzo PA   lisinopril (PRINIVIL;ZESTRIL) 20 MG tablet Take 1 tablet by mouth in the morning and at bedtime  Thor Alonzo PA   Elderberry 500 MG CAPS Take 500 mg by mouth daily  Provider, MD Daphney   Multiple Vitamin (MULTIVITAMIN ADULT PO) Take 1 tablet by mouth daily  ProviderDaphney MD   acetaminophen (TYLENOL) 650 MG extended release tablet Take 1 tablet by mouth 2 times daily as needed  Automatic Reconciliation, Ar   ascorbic acid (VITAMIN C) 500 MG tablet Take 1 tablet by mouth daily  Automatic Reconciliation, Ar       CareTeam (Including outside providers/suppliers regularly involved in providing care):   Patient Care Team:  Thor Alonzo PA as PCP - General (Physician Assistant)  Thor Alonzo PA as PCP - Empaneled Provider     Reviewed and updated this visit:  Tobacco  Allergies

## 2024-02-02 NOTE — PATIENT INSTRUCTIONS
instruction, always ask your healthcare professional. [employer], [employer] disclaims any warranty or liability for your use of this information.           Starting a Weight Loss Plan: Care Instructions  Overview     If you're thinking about losing weight, it can be hard to know where to start. Your doctor can help you set up a weight loss plan that best meets your needs. You may want to take a class on nutrition or exercise, or you could join a weight loss support group. If you have questions about how to make changes to your eating or exercise habits, ask your doctor about seeing a registered dietitian or an exercise specialist.  It can be a big challenge to lose weight. But you don't have to make huge changes at once. Make small changes, and stick with them. When those changes become habit, add a few more changes.  If you don't think you're ready to make changes right now, try to pick a date in the future. Make an appointment to see your doctor to discuss whether the time is right for you to start a plan.  Follow-up care is a key part of your treatment and safety. Be sure to make and go to all appointments, and call your doctor if you are having problems. It's also a good idea to know your test results and keep a list of the medicines you take.  How can you care for yourself at home?  Set realistic goals. Many people expect to lose much more weight than is likely. A weight loss of 5% to 10% of your body weight may be enough to improve your health.  Get family and friends involved to provide support. Talk to them about why you are trying to lose weight, and ask them to help. They can help by participating in exercise and having meals with you, even if they may be eating something different.  Find what works best for you. If you do not have time or do not like to cook, a program that offers meal replacement bars or shakes may be better for you. Or if you like to prepare meals, finding a plan that includes daily

## 2024-04-14 DIAGNOSIS — I10 PRIMARY HYPERTENSION: ICD-10-CM

## 2024-04-15 RX ORDER — LISINOPRIL 20 MG/1
20 TABLET ORAL 2 TIMES DAILY
Qty: 180 TABLET | Refills: 1 | Status: SHIPPED | OUTPATIENT
Start: 2024-04-15

## 2024-04-16 ENCOUNTER — HOSPITAL ENCOUNTER (EMERGENCY)
Age: 79
Discharge: HOME | End: 2024-04-16
Attending: EMERGENCY MEDICINE
Payer: MEDICARE

## 2024-04-16 ENCOUNTER — APPOINTMENT (OUTPATIENT)
Dept: GENERAL RADIOLOGY | Age: 79
End: 2024-04-16
Payer: MEDICARE

## 2024-04-16 VITALS
WEIGHT: 240 LBS | TEMPERATURE: 97.6 F | OXYGEN SATURATION: 97 % | HEART RATE: 77 BPM | DIASTOLIC BLOOD PRESSURE: 58 MMHG | RESPIRATION RATE: 17 BRPM | BODY MASS INDEX: 40.97 KG/M2 | SYSTOLIC BLOOD PRESSURE: 108 MMHG | HEIGHT: 64 IN

## 2024-04-16 DIAGNOSIS — M77.02 MEDIAL EPICONDYLITIS OF LEFT ELBOW: ICD-10-CM

## 2024-04-16 DIAGNOSIS — M77.12 LATERAL EPICONDYLITIS OF LEFT ELBOW: Primary | ICD-10-CM

## 2024-04-16 PROCEDURE — 73080 X-RAY EXAM OF ELBOW: CPT

## 2024-04-16 PROCEDURE — 99283 EMERGENCY DEPT VISIT LOW MDM: CPT

## 2024-04-16 RX ORDER — DICLOFENAC SODIUM 75 MG/1
75 TABLET, DELAYED RELEASE ORAL 2 TIMES DAILY
Qty: 20 TABLET | Refills: 0 | Status: SHIPPED | OUTPATIENT
Start: 2024-04-16

## 2024-04-16 ASSESSMENT — PAIN - FUNCTIONAL ASSESSMENT: PAIN_FUNCTIONAL_ASSESSMENT: 0-10

## 2024-04-16 ASSESSMENT — PAIN SCALES - GENERAL: PAINLEVEL_OUTOF10: 6

## 2024-04-16 NOTE — ED TRIAGE NOTES
Pt ambulatory with Rolator with c/o left elbow pain and difficulty bending her arm for ami 2 weeks. Pt reports she has a prosthesis in her left arm from her mid upper arm to her mid lower arm from a crush injury ami 40 years ago. Pt denies new injury and denies cardiac history.

## 2024-04-16 NOTE — ED PROVIDER NOTES
Edema     PER PT-- BETTER--     GERD (gastroesophageal reflux disease)     controlled with medication (denies hiatal hernia)     Gout 2/26/2013    Hyperlipidemia 2/26/2013    Hypertension     managed with meds    IBS (irritable bowel syndrome)     Mixed hyperlipidemia 10/16/2018    Morbid obesity (HCC)     bmi=47    PONV (postoperative nausea and vomiting)     JOSÉ MIGUEL--- per pt-- was r/t/ dilaudid    Prediabetes     per pt-- no meds and does not check sqbs at home-- LAST HA1C 5.6-- 10/2019 PER PT    Sciatica     right worse than left    Sleep apnea     plans to be tested in future    Vitamin D deficiency 2/19/2015        Past Surgical History:   Procedure Laterality Date    APPENDECTOMY  1954    CATARACT REMOVAL Bilateral     2014 - Dr. Doty    CATARACT REMOVAL Bilateral 2016    Dr. Doty    CHOLECYSTECTOMY, LAPAROSCOPIC  1993    COLONOSCOPY N/A 11/12/2019    COLONOSCOPY/BMI 48 performed by Jarad Kitchen MD at [facility] ENDOSCOPY    HERNIA REPAIR  1963    right inguinal hernia    HERNIA REPAIR  1963,1997,2004    umbilical hernia repair    HYSTERECTOMY (CERVIX STATUS UNKNOWN)  1986    ovaries remain    ORTHOPEDIC SURGERY Right 1982    right elbow surgery replacement x 2    ROTATOR CUFF REPAIR Left 2011    SHOULDER ARTHROPLASTY Right 2013    TONSILLECTOMY  1955    TOTAL KNEE ARTHROPLASTY Right 2012    TOTAL KNEE ARTHROPLASTY Left 2005        Social History     Socioeconomic History    Marital status:      Spouse name: None    Number of children: None    Years of education: None    Highest education level: None   Tobacco Use    Smoking status: Never    Smokeless tobacco: Never   Vaping Use    Vaping Use: Never used   Substance and Sexual Activity    Alcohol use: No    Drug use: No    Sexual activity: Not Currently     Partners: Male     Social Determinants of Health     Food Insecurity: No Food Insecurity (1/17/2024)    Hunger Vital Sign     Worried About Running Out of Food in the Last Year: Never true

## 2024-04-16 NOTE — ED NOTES
Patient mobility status  with no difficulty using her walker. Provider aware     I have reviewed discharge instructions with the patient and family member.  The patient and family member verbalized understanding.    Patient left ED via Discharge Method: walker to Home with  family .    Opportunity for questions and clarification provided.     Patient given 0 scripts. Sent to patients preferred pharmacy.

## 2024-04-28 DIAGNOSIS — E78.2 MIXED HYPERLIPIDEMIA: ICD-10-CM

## 2024-04-29 RX ORDER — ROSUVASTATIN CALCIUM 5 MG/1
5 TABLET, COATED ORAL NIGHTLY
Qty: 90 TABLET | Refills: 1 | Status: SHIPPED | OUTPATIENT
Start: 2024-04-29

## 2024-04-29 NOTE — TELEPHONE ENCOUNTER
Please advise. Pt last seen on 2/2/24, follow-up scheduled for 8/5/24. Rx last wrote on 8/22/23 #90 with 1 refill. Rx pended.

## 2024-05-20 ENCOUNTER — OFFICE VISIT (OUTPATIENT)
Age: 79
End: 2024-05-20
Payer: MEDICARE

## 2024-05-20 DIAGNOSIS — M25.522 LEFT ELBOW PAIN: Primary | ICD-10-CM

## 2024-05-20 DIAGNOSIS — Z96.622 PRESENCE OF LEFT ARTIFICIAL ELBOW JOINT: ICD-10-CM

## 2024-05-20 PROCEDURE — 1123F ACP DISCUSS/DSCN MKR DOCD: CPT | Performed by: NURSE PRACTITIONER

## 2024-05-20 PROCEDURE — 99214 OFFICE O/P EST MOD 30 MIN: CPT | Performed by: NURSE PRACTITIONER

## 2024-05-20 NOTE — PROGRESS NOTES
Orthopaedic Hand Clinic Note    Name: Fanny Cho  YOB: 1945  Gender: female  MRN: [id number]      CC: Patient referred for evaluation of left elbow pain    HPI: Fanny Cho is a 78 y.o. female right hand dominant with a chief complaint of left elbow pain.  She is accompanied by her daughter.  She reports a fall approximately 40 years ago resulting in a left elbow replacement.  She said she has done well.  She said over the last couple years her elbow would bother her time to time.  She had several falls at the end of last year resulting in her using a rollator walker.  She notes her left elbow became increasingly worse since that time.  She has been seen at urgent care in the emergency room due to pain.  She was referred over to orthopedics for follow-up.  She reports Dr. Verma replaced her elbow over 40 years ago.  She has been using Voltaren which did not help.  She was also given a steroid injection as well as a steroid pack which she reports did not improve her symptoms at all.    ROS/Meds/PSH/PMH/FH/SH: I personally reviewed the patients standard intake form.  Pertinents are discussed in the HPI    Physical Examination:  General: Awake and alert.  HEENT: Normocephalic, atraumatic  CV/Pulm: Breathing even and unlabored  Skin: No obvious rashes noted.  Lymphatic: No obvious evidence of lymphedema or lymphadenopathy    Musculoskeletal Exam:  Examination on the left upper extremity demonstrates cap refill < 5 seconds in all fingers  Patient has mild swelling to the left elbow.  There is no ecchymosis present.  She lacks full extension which is baseline.  I can extend her to about 15 to 20 degrees.  She has 125 degrees of flexion which is baseline.  She has 90 degrees of pronation and 65 degrees of supination.  She is tender to palpation both over the lateral and medial side of the elbow.  She has no pain over the olecranon.  She has some discomfort with resisted elbow extension.

## 2024-05-28 ENCOUNTER — HOSPITAL ENCOUNTER (OUTPATIENT)
Dept: CT IMAGING | Age: 79
Discharge: HOME | End: 2024-05-31
Payer: MEDICARE

## 2024-05-28 DIAGNOSIS — Z96.622 PRESENCE OF LEFT ARTIFICIAL ELBOW JOINT: ICD-10-CM

## 2024-05-28 DIAGNOSIS — M25.522 LEFT ELBOW PAIN: ICD-10-CM

## 2024-05-28 PROCEDURE — 73200 CT UPPER EXTREMITY W/O DYE: CPT

## 2024-06-04 ENCOUNTER — OFFICE VISIT (OUTPATIENT)
Age: 79
End: 2024-06-04
Payer: MEDICARE

## 2024-06-04 DIAGNOSIS — K21.9 GASTROESOPHAGEAL REFLUX DISEASE WITHOUT ESOPHAGITIS: ICD-10-CM

## 2024-06-04 DIAGNOSIS — M97.42XA: ICD-10-CM

## 2024-06-04 DIAGNOSIS — M25.522 LEFT ELBOW PAIN: Primary | ICD-10-CM

## 2024-06-04 PROCEDURE — 99214 OFFICE O/P EST MOD 30 MIN: CPT | Performed by: NURSE PRACTITIONER

## 2024-06-04 PROCEDURE — 1123F ACP DISCUSS/DSCN MKR DOCD: CPT | Performed by: NURSE PRACTITIONER

## 2024-06-04 NOTE — PROGRESS NOTES
Orthopaedic Hand Clinic Note    Name: Fanny Cho  YOB: 1945  Gender: female  MRN: [id number]      Follow up visit:   1. Left elbow pain    2. Periprosthetic fracture around internal prosthetic left elbow joint, initial encounter        HPI: Fanny Cho is a 78 y.o. female who is following up for left elbow pain.  She is here to discuss her CT results.  She is accompanied by her daughter.    5/20/24: Fanny Cho is a 78 y.o. female right hand dominant with a chief complaint of left elbow pain.  She is accompanied by her daughter.  She reports a fall approximately 40 years ago resulting in a left elbow replacement.  She said she has done well.  She said over the last couple years her elbow would bother her time to time.  She had several falls at the end of last year resulting in her using a rollator walker.  She notes her left elbow became increasingly worse since that time.  She has been seen at urgent care in the emergency room due to pain.  She was referred over to orthopedics for follow-up.  She reports Dr. Verma replaced her elbow over 40 years ago.  She has been using Voltaren which did not help.  She was also given a steroid injection as well as a steroid pack which she reports did not improve her symptoms at all.     ROS/Meds/PSH/PMH/FH/SH: I personally reviewed the patients standard intake form.  Pertinents are discussed in the HPI    Physical Examination:    Musculoskeletal Examination:  Examination on the left upper extremity demonstrates cap refill < 5 seconds in all fingers  Patient has mild swelling to the left elbow. There is no ecchymosis present. She lacks full extension which is baseline. I can extend her to about 15 to 20 degrees. She has 125 degrees of flexion which is baseline. She has 90 degrees of pronation and 65 degrees of supination. She is tender to palpation both over the lateral and medial side of the elbow. She has no pain over the olecranon.

## 2024-06-05 RX ORDER — OMEPRAZOLE 20 MG/1
20 CAPSULE, DELAYED RELEASE ORAL DAILY
Qty: 90 CAPSULE | Refills: 1 | OUTPATIENT
Start: 2024-06-05

## 2024-06-21 DIAGNOSIS — K21.9 GASTROESOPHAGEAL REFLUX DISEASE WITHOUT ESOPHAGITIS: ICD-10-CM

## 2024-06-21 RX ORDER — OMEPRAZOLE 20 MG/1
20 CAPSULE, DELAYED RELEASE ORAL DAILY
Qty: 90 CAPSULE | Refills: 0 | Status: SHIPPED | OUTPATIENT
Start: 2024-06-21

## 2024-06-21 NOTE — TELEPHONE ENCOUNTER
PT IS NEEDING A REFILL ON  -omeprazole (PRILOSEC) 20 MG delayed release capsule   90 DAY QUANTITY W/ 1 REFILL    SEND TO:  [id number] [address] [phone] -  [phone]     LOV:2/2/24  NOV:8/19/24   with patient

## 2024-07-09 ENCOUNTER — TELEMEDICINE (OUTPATIENT)
Dept: INTERNAL MEDICINE CLINIC | Facility: CLINIC | Age: 79
End: 2024-07-09
Payer: MEDICARE

## 2024-07-09 DIAGNOSIS — M25.522 LEFT ELBOW PAIN: Primary | ICD-10-CM

## 2024-07-09 DIAGNOSIS — M10.9 GOUT, UNSPECIFIED CAUSE, UNSPECIFIED CHRONICITY, UNSPECIFIED SITE: ICD-10-CM

## 2024-07-09 PROCEDURE — 99213 OFFICE O/P EST LOW 20 MIN: CPT | Performed by: PHYSICIAN ASSISTANT

## 2024-07-09 PROCEDURE — 1123F ACP DISCUSS/DSCN MKR DOCD: CPT | Performed by: PHYSICIAN ASSISTANT

## 2024-07-09 RX ORDER — ALLOPURINOL 300 MG/1
300 TABLET ORAL DAILY
Qty: 90 TABLET | Refills: 1 | Status: SHIPPED | OUTPATIENT
Start: 2024-07-09

## 2024-07-09 ASSESSMENT — ENCOUNTER SYMPTOMS
VOICE CHANGE: 0
COLOR CHANGE: 0
EYE PAIN: 0
VOMITING: 0
CONSTIPATION: 0
DIARRHEA: 0
CHEST TIGHTNESS: 0
SHORTNESS OF BREATH: 0
WHEEZING: 0
ABDOMINAL PAIN: 0
SORE THROAT: 0
NAUSEA: 0
COUGH: 0

## 2024-07-09 NOTE — PROGRESS NOTES
PROGRESS NOTE    Fanny Cho is a 78 y.o. female seen for a follow up visit regarding   Chief Complaint   Patient presents with    Fall     Patient had a fall in September, and is still having balance issues and having difficulty going out by her self.         HPI  Fall  Pertinent negatives include no abdominal pain, fever, headaches, hematuria, nausea, numbness or vomiting.       Past Medical History, Past Surgical History, Family history, Social History, and Medications were all reviewed with the patient today and updated as necessary.       Current Outpatient Medications   Medication Sig Dispense Refill    allopurinol (ZYLOPRIM) 300 MG tablet Take 1 tablet by mouth daily 90 tablet 1    omeprazole (PRILOSEC) 20 MG delayed release capsule Take 1 capsule by mouth Daily 90 capsule 0    rosuvastatin (CRESTOR) 5 MG tablet TAKE ONE TABLET BY MOUTH EVERY EVENING 90 tablet 1    diclofenac (VOLTAREN) 75 MG EC tablet Take 1 tablet by mouth 2 times daily 20 tablet 0    lisinopril (PRINIVIL;ZESTRIL) 20 MG tablet Take 1 tablet by mouth 2 times daily 180 tablet 1    Elderberry 500 MG CAPS Take 500 mg by mouth daily      Multiple Vitamin (MULTIVITAMIN ADULT PO) Take 1 tablet by mouth daily      acetaminophen (TYLENOL) 650 MG extended release tablet Take 1 tablet by mouth 2 times daily as needed      ascorbic acid (VITAMIN C) 500 MG tablet Take 1 tablet by mouth daily      albuterol sulfate HFA (VENTOLIN HFA) 108 (90 Base) MCG/ACT inhaler Inhale 2 puffs into the lungs 4 times daily as needed for Wheezing (Patient not taking: Reported on 7/9/2024) 18 g 0     No current facility-administered medications for this visit.     Allergies   Allergen Reactions    Hydromorphone Other (See Comments)     Difficulty breathing in recovery room, pt requests no additional use    Adhesive Tape Rash and Other (See Comments)     Only after prolonged periods of time.       Cephalexin Rash    Clindamycin Rash         ROS  Review of Systems 
Detail Level: Simple
Comment: Poss TE and/or androgenetic alopecia

## 2024-08-19 ENCOUNTER — OFFICE VISIT (OUTPATIENT)
Dept: INTERNAL MEDICINE CLINIC | Facility: CLINIC | Age: 79
End: 2024-08-19

## 2024-08-19 ENCOUNTER — TELEPHONE (OUTPATIENT)
Dept: INTERNAL MEDICINE CLINIC | Facility: CLINIC | Age: 79
End: 2024-08-19

## 2024-08-19 VITALS
HEART RATE: 72 BPM | HEIGHT: 64 IN | WEIGHT: 225 LBS | SYSTOLIC BLOOD PRESSURE: 132 MMHG | DIASTOLIC BLOOD PRESSURE: 64 MMHG | OXYGEN SATURATION: 95 % | BODY MASS INDEX: 38.41 KG/M2

## 2024-08-19 DIAGNOSIS — K21.9 GASTROESOPHAGEAL REFLUX DISEASE WITHOUT ESOPHAGITIS: ICD-10-CM

## 2024-08-19 DIAGNOSIS — M25.541 ARTHRALGIA OF BOTH HANDS: ICD-10-CM

## 2024-08-19 DIAGNOSIS — I10 PRIMARY HYPERTENSION: ICD-10-CM

## 2024-08-19 DIAGNOSIS — E78.2 MIXED HYPERLIPIDEMIA: ICD-10-CM

## 2024-08-19 DIAGNOSIS — M25.542 ARTHRALGIA OF BOTH HANDS: ICD-10-CM

## 2024-08-19 DIAGNOSIS — R26.89 BALANCE PROBLEM: ICD-10-CM

## 2024-08-19 DIAGNOSIS — E78.2 MIXED HYPERLIPIDEMIA: Primary | ICD-10-CM

## 2024-08-19 DIAGNOSIS — L98.9 SKIN LESION OF FOOT: ICD-10-CM

## 2024-08-19 LAB
ALBUMIN SERPL-MCNC: 4 G/DL (ref 3.2–4.6)
ALBUMIN/GLOB SERPL: 1.5 (ref 1–1.9)
ALP SERPL-CCNC: 80 U/L (ref 35–104)
ALT SERPL-CCNC: 8 U/L (ref 12–65)
ANION GAP SERPL CALC-SCNC: 12 MMOL/L (ref 9–18)
AST SERPL-CCNC: 18 U/L (ref 15–37)
BILIRUB SERPL-MCNC: 0.7 MG/DL (ref 0–1.2)
BUN SERPL-MCNC: 19 MG/DL (ref 8–23)
CALCIUM SERPL-MCNC: 9.6 MG/DL (ref 8.8–10.2)
CHLORIDE SERPL-SCNC: 105 MMOL/L (ref 98–107)
CHOLEST SERPL-MCNC: 157 MG/DL (ref 0–200)
CO2 SERPL-SCNC: 25 MMOL/L (ref 20–28)
CREAT SERPL-MCNC: 1 MG/DL (ref 0.6–1.1)
GLOBULIN SER CALC-MCNC: 2.7 G/DL (ref 2.3–3.5)
GLUCOSE SERPL-MCNC: 95 MG/DL (ref 70–99)
HDLC SERPL-MCNC: 34 MG/DL (ref 40–60)
HDLC SERPL: 4.7 (ref 0–5)
LDLC SERPL CALC-MCNC: 76 MG/DL (ref 0–100)
POTASSIUM SERPL-SCNC: 4 MMOL/L (ref 3.5–5.1)
PROT SERPL-MCNC: 6.7 G/DL (ref 6.3–8.2)
SODIUM SERPL-SCNC: 141 MMOL/L (ref 136–145)
TRIGL SERPL-MCNC: 236 MG/DL (ref 0–150)
VLDLC SERPL CALC-MCNC: 47 MG/DL (ref 6–23)

## 2024-08-19 RX ORDER — LISINOPRIL 10 MG/1
10 TABLET ORAL 2 TIMES DAILY
Qty: 60 TABLET | Refills: 3 | Status: SHIPPED | OUTPATIENT
Start: 2024-08-19

## 2024-08-19 RX ORDER — ROSUVASTATIN CALCIUM 5 MG/1
5 TABLET, COATED ORAL NIGHTLY
Qty: 90 TABLET | Refills: 1 | Status: SHIPPED | OUTPATIENT
Start: 2024-08-19

## 2024-08-19 RX ORDER — OMEPRAZOLE 20 MG/1
20 CAPSULE, DELAYED RELEASE ORAL DAILY
Qty: 90 CAPSULE | Refills: 1 | Status: SHIPPED | OUTPATIENT
Start: 2024-08-19

## 2024-08-19 ASSESSMENT — ENCOUNTER SYMPTOMS
CONSTIPATION: 0
VOICE CHANGE: 0
CHEST TIGHTNESS: 0
SORE THROAT: 0
VOMITING: 0
EYE PAIN: 0
DIARRHEA: 0
WHEEZING: 0
COUGH: 0
SHORTNESS OF BREATH: 0
NAUSEA: 0
ABDOMINAL PAIN: 0
COLOR CHANGE: 0

## 2024-08-19 ASSESSMENT — PATIENT HEALTH QUESTIONNAIRE - PHQ9
SUM OF ALL RESPONSES TO PHQ QUESTIONS 1-9: 0
2. FEELING DOWN, DEPRESSED OR HOPELESS: NOT AT ALL
1. LITTLE INTEREST OR PLEASURE IN DOING THINGS: NOT AT ALL
SUM OF ALL RESPONSES TO PHQ QUESTIONS 1-9: 0
SUM OF ALL RESPONSES TO PHQ9 QUESTIONS 1 & 2: 0

## 2024-08-19 NOTE — PROGRESS NOTES
PROGRESS NOTE    Chief Complaint   Patient presents with    Hypertension    light headed     Decreased B/P medication to half     Foot Problem     Right foot problem between toes        HPI  Hypertension  Pertinent negatives include no chest pain, headaches, neck pain, palpitations or shortness of breath.   Foot Problem  Pertinent negatives include no abdominal pain, arthralgias, chest pain, congestion, coughing, fatigue, fever, headaches, joint swelling, nausea, neck pain, numbness, rash, sore throat or vomiting.       Past Medical History, Past Surgical History, Family history, Social History, and Medications were all reviewed with the patient today and updated as necessary.       Current Outpatient Medications   Medication Sig Dispense Refill    omeprazole (PRILOSEC) 20 MG delayed release capsule Take 1 capsule by mouth Daily 90 capsule 1    rosuvastatin (CRESTOR) 5 MG tablet Take 1 tablet by mouth nightly 90 tablet 1    lisinopril (PRINIVIL;ZESTRIL) 10 MG tablet Take 1 tablet by mouth 2 times daily 60 tablet 3    mupirocin (BACTROBAN) 2 % ointment Apply topically 3 times daily. 30 g 0    allopurinol (ZYLOPRIM) 300 MG tablet Take 1 tablet by mouth daily 90 tablet 1    Elderberry 500 MG CAPS Take 500 mg by mouth daily      Multiple Vitamin (MULTIVITAMIN ADULT PO) Take 1 tablet by mouth daily      acetaminophen (TYLENOL) 650 MG extended release tablet Take 1 tablet by mouth 2 times daily as needed      ascorbic acid (VITAMIN C) 500 MG tablet Take 1 tablet by mouth daily      diclofenac (VOLTAREN) 75 MG EC tablet Take 1 tablet by mouth 2 times daily (Patient not taking: Reported on 8/19/2024) 20 tablet 0    albuterol sulfate HFA (VENTOLIN HFA) 108 (90 Base) MCG/ACT inhaler Inhale 2 puffs into the lungs 4 times daily as needed for Wheezing (Patient not taking: Reported on 8/19/2024) 18 g 0     No current facility-administered medications for this visit.     Allergies   Allergen Reactions    Hydromorphone Other (See

## 2024-08-19 NOTE — TELEPHONE ENCOUNTER
Call from  [facility] indicating they would not be able to see patient by the time requested   Hide Additional Notes?: No Detail Level: Generalized

## 2024-08-20 LAB
ANA SER QL: NEGATIVE
RHEUMATOID FACT SER QL LA: NEGATIVE

## 2024-08-21 LAB — CRP SERPL-MCNC: 2 MG/L (ref 0–10)

## 2024-09-06 ENCOUNTER — TELEPHONE (OUTPATIENT)
Dept: INTERNAL MEDICINE CLINIC | Facility: CLINIC | Age: 79
End: 2024-09-06

## 2024-09-06 NOTE — TELEPHONE ENCOUNTER
Whitley with [facility] called to let Thor know patient is having to miss PT this week due to dental work

## 2024-09-25 ENCOUNTER — HOSPITAL ENCOUNTER (EMERGENCY)
Age: 79
Discharge: HOME | End: 2024-09-25
Payer: MEDICARE

## 2024-09-25 ENCOUNTER — APPOINTMENT (OUTPATIENT)
Dept: GENERAL RADIOLOGY | Age: 79
End: 2024-09-25
Payer: MEDICARE

## 2024-09-25 VITALS
TEMPERATURE: 98.3 F | OXYGEN SATURATION: 97 % | HEART RATE: 70 BPM | HEIGHT: 64 IN | RESPIRATION RATE: 15 BRPM | SYSTOLIC BLOOD PRESSURE: 140 MMHG | WEIGHT: 216 LBS | BODY MASS INDEX: 36.88 KG/M2 | DIASTOLIC BLOOD PRESSURE: 74 MMHG

## 2024-09-25 DIAGNOSIS — R53.81 MALAISE AND FATIGUE: Primary | ICD-10-CM

## 2024-09-25 DIAGNOSIS — R53.83 MALAISE AND FATIGUE: Primary | ICD-10-CM

## 2024-09-25 LAB
ALBUMIN SERPL-MCNC: 3.6 G/DL (ref 3.2–4.6)
ALBUMIN/GLOB SERPL: 1.3 (ref 1–1.9)
ALP SERPL-CCNC: 85 U/L (ref 35–104)
ALT SERPL-CCNC: 9 U/L (ref 8–45)
ANION GAP SERPL CALC-SCNC: 16 MMOL/L (ref 9–18)
APPEARANCE UR: CLEAR
AST SERPL-CCNC: 24 U/L (ref 15–37)
BASOPHILS # BLD: 0.1 K/UL (ref 0–0.2)
BASOPHILS NFR BLD: 1 % (ref 0–2)
BILIRUB SERPL-MCNC: 1.1 MG/DL (ref 0–1.2)
BILIRUB UR QL: NEGATIVE
BUN SERPL-MCNC: 21 MG/DL (ref 8–23)
CALCIUM SERPL-MCNC: 9.1 MG/DL (ref 8.8–10.2)
CHLORIDE SERPL-SCNC: 100 MMOL/L (ref 98–107)
CO2 SERPL-SCNC: 22 MMOL/L (ref 20–28)
COLOR UR: ABNORMAL
CREAT SERPL-MCNC: 1.08 MG/DL (ref 0.6–1.1)
DIFFERENTIAL METHOD BLD: ABNORMAL
EKG ATRIAL RATE: 79 BPM
EKG DIAGNOSIS: NORMAL
EKG P AXIS: 38 DEGREES
EKG Q-T INTERVAL: 382 MS
EKG QRS DURATION: 97 MS
EKG QTC CALCULATION (BAZETT): 424 MS
EKG R AXIS: 26 DEGREES
EKG T AXIS: 61 DEGREES
EKG VENTRICULAR RATE: 74 BPM
EOSINOPHIL # BLD: 0.1 K/UL (ref 0–0.8)
EOSINOPHIL NFR BLD: 1 % (ref 0.5–7.8)
ERYTHROCYTE [DISTWIDTH] IN BLOOD BY AUTOMATED COUNT: 14.2 % (ref 11.9–14.6)
GLOBULIN SER CALC-MCNC: 2.8 G/DL (ref 2.3–3.5)
GLUCOSE SERPL-MCNC: 99 MG/DL (ref 70–99)
GLUCOSE UR STRIP.AUTO-MCNC: NEGATIVE MG/DL
HCT VFR BLD AUTO: 33.2 % (ref 35.8–46.3)
HGB BLD-MCNC: 10.9 G/DL (ref 11.7–15.4)
HGB UR QL STRIP: NEGATIVE
IMM GRANULOCYTES # BLD AUTO: 0 K/UL (ref 0–0.5)
IMM GRANULOCYTES NFR BLD AUTO: 0 % (ref 0–5)
KETONES UR QL STRIP.AUTO: ABNORMAL MG/DL
LEUKOCYTE ESTERASE UR QL STRIP.AUTO: NEGATIVE
LYMPHOCYTES # BLD: 1.5 K/UL (ref 0.5–4.6)
LYMPHOCYTES NFR BLD: 18 % (ref 13–44)
MAGNESIUM SERPL-MCNC: 1.8 MG/DL (ref 1.8–2.4)
MCH RBC QN AUTO: 31.4 PG (ref 26.1–32.9)
MCHC RBC AUTO-ENTMCNC: 32.8 G/DL (ref 31.4–35)
MCV RBC AUTO: 95.7 FL (ref 82–102)
MONOCYTES # BLD: 0.6 K/UL (ref 0.1–1.3)
MONOCYTES NFR BLD: 7 % (ref 4–12)
NEUTS SEG # BLD: 6 K/UL (ref 1.7–8.2)
NEUTS SEG NFR BLD: 73 % (ref 43–78)
NITRITE UR QL STRIP.AUTO: NEGATIVE
NRBC # BLD: 0 K/UL (ref 0–0.2)
PH UR STRIP: 5.5 (ref 5–9)
PLATELET # BLD AUTO: 195 K/UL (ref 150–450)
PMV BLD AUTO: 9.9 FL (ref 9.4–12.3)
POTASSIUM SERPL-SCNC: 3.8 MMOL/L (ref 3.5–5.1)
PROT SERPL-MCNC: 6.4 G/DL (ref 6.3–8.2)
PROT UR STRIP-MCNC: NEGATIVE MG/DL
RBC # BLD AUTO: 3.47 M/UL (ref 4.05–5.2)
SODIUM SERPL-SCNC: 138 MMOL/L (ref 136–145)
SP GR UR REFRACTOMETRY: 1 (ref 1–1.02)
UROBILINOGEN UR QL STRIP.AUTO: 0.2 EU/DL (ref 0.2–1)
WBC # BLD AUTO: 8.3 K/UL (ref 4.3–11.1)

## 2024-09-25 PROCEDURE — 2580000003 HC RX 258

## 2024-09-25 PROCEDURE — 93005 ELECTROCARDIOGRAM TRACING: CPT

## 2024-09-25 PROCEDURE — 99285 EMERGENCY DEPT VISIT HI MDM: CPT

## 2024-09-25 PROCEDURE — 85025 COMPLETE CBC W/AUTO DIFF WBC: CPT

## 2024-09-25 PROCEDURE — 71045 X-RAY EXAM CHEST 1 VIEW: CPT

## 2024-09-25 PROCEDURE — 80053 COMPREHEN METABOLIC PANEL: CPT

## 2024-09-25 PROCEDURE — 83735 ASSAY OF MAGNESIUM: CPT

## 2024-09-25 PROCEDURE — 81003 URINALYSIS AUTO W/O SCOPE: CPT

## 2024-09-25 PROCEDURE — 93010 ELECTROCARDIOGRAM REPORT: CPT | Performed by: INTERNAL MEDICINE

## 2024-09-25 PROCEDURE — 96360 HYDRATION IV INFUSION INIT: CPT

## 2024-09-25 RX ORDER — 0.9 % SODIUM CHLORIDE 0.9 %
1000 INTRAVENOUS SOLUTION INTRAVENOUS ONCE
Status: COMPLETED | OUTPATIENT
Start: 2024-09-25 | End: 2024-09-25

## 2024-09-25 RX ADMIN — SODIUM CHLORIDE 1000 ML: 9 INJECTION, SOLUTION INTRAVENOUS at 09:22

## 2024-09-25 ASSESSMENT — PAIN - FUNCTIONAL ASSESSMENT: PAIN_FUNCTIONAL_ASSESSMENT: 0-10

## 2024-09-25 ASSESSMENT — PAIN SCALES - GENERAL: PAINLEVEL_OUTOF10: 0

## 2024-09-28 ENCOUNTER — APPOINTMENT (OUTPATIENT)
Dept: CT IMAGING | Age: 79
End: 2024-09-28
Payer: MEDICARE

## 2024-09-28 ENCOUNTER — HOSPITAL ENCOUNTER (EMERGENCY)
Age: 79
Discharge: HOME | End: 2024-09-28
Attending: EMERGENCY MEDICINE
Payer: MEDICARE

## 2024-09-28 ENCOUNTER — APPOINTMENT (OUTPATIENT)
Dept: GENERAL RADIOLOGY | Age: 79
End: 2024-09-28
Payer: MEDICARE

## 2024-09-28 VITALS
SYSTOLIC BLOOD PRESSURE: 120 MMHG | RESPIRATION RATE: 16 BRPM | HEART RATE: 67 BPM | DIASTOLIC BLOOD PRESSURE: 59 MMHG | OXYGEN SATURATION: 99 %
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
## 2024-09-28 LAB
ALBUMIN SERPL-MCNC: 3.1 G/DL (ref 3.2–4.6)
ALBUMIN/GLOB SERPL: 1.2 (ref 1–1.9)
ALP SERPL-CCNC: 79 U/L (ref 35–104)
ALT SERPL-CCNC: 22 U/L (ref 8–45)
ANION GAP SERPL CALC-SCNC: 13 MMOL/L (ref 9–18)
APPEARANCE UR: CLEAR
AST SERPL-CCNC: 83 U/L (ref 15–37)
BACTERIA URNS QL MICRO: 0 /HPF
BASOPHILS # BLD: 0.1 K/UL (ref 0–0.2)
BASOPHILS NFR BLD: 1 % (ref 0–2)
BILIRUB SERPL-MCNC: 0.9 MG/DL (ref 0–1.2)
BILIRUB UR QL: NEGATIVE
BUN SERPL-MCNC: 21 MG/DL (ref 8–23)
CALCIUM SERPL-MCNC: 9 MG/DL (ref 8.8–10.2)
CASTS URNS QL MICRO: ABNORMAL /LPF
CHLORIDE SERPL-SCNC: 104 MMOL/L (ref 98–107)
CO2 SERPL-SCNC: 22 MMOL/L (ref 20–28)
COLOR UR: ABNORMAL
CREAT SERPL-MCNC: 0.97 MG/DL (ref 0.6–1.1)
DIFFERENTIAL METHOD BLD: ABNORMAL
EOSINOPHIL # BLD: 0 K/UL (ref 0–0.8)
EOSINOPHIL NFR BLD: 0 % (ref 0.5–7.8)
EPI CELLS #/AREA URNS HPF: ABNORMAL /HPF
ERYTHROCYTE [DISTWIDTH] IN BLOOD BY AUTOMATED COUNT: 13.6 % (ref 11.9–14.6)
GLOBULIN SER CALC-MCNC: 2.6 G/DL (ref 2.3–3.5)
GLUCOSE SERPL-MCNC: 108 MG/DL (ref 70–99)
GLUCOSE UR STRIP.AUTO-MCNC: NEGATIVE MG/DL
HCT VFR BLD AUTO: 36.1 % (ref 35.8–46.3)
HGB BLD-MCNC: 11.7 G/DL (ref 11.7–15.4)
HGB UR QL STRIP: ABNORMAL
IMM GRANULOCYTES # BLD AUTO: 0 K/UL (ref 0–0.5)
IMM GRANULOCYTES NFR BLD AUTO: 0 % (ref 0–5)
KETONES UR QL STRIP.AUTO: 15 MG/DL
LEUKOCYTE ESTERASE UR QL STRIP.AUTO: NEGATIVE
LYMPHOCYTES # BLD: 1.5 K/UL (ref 0.5–4.6)
LYMPHOCYTES NFR BLD: 14 % (ref 13–44)
MCH RBC QN AUTO: 30.8 PG (ref 26.1–32.9)
MCHC RBC AUTO-ENTMCNC: 32.4 G/DL (ref 31.4–35)
MCV RBC AUTO: 95 FL (ref 82–102)
MONOCYTES # BLD: 1 K/UL (ref 0.1–1.3)
MONOCYTES NFR BLD: 10 % (ref 4–12)
NEUTS SEG # BLD: 7.8 K/UL (ref 1.7–8.2)
NEUTS SEG NFR BLD: 75 % (ref 43–78)
NITRITE UR QL STRIP.AUTO: NEGATIVE
NRBC # BLD: 0 K/UL (ref 0–0.2)
OTHER OBSERVATIONS: ABNORMAL
PH UR STRIP: 5.5 (ref 5–9)
PLATELET # BLD AUTO: 224 K/UL (ref 150–450)
PMV BLD AUTO: 9.9 FL (ref 9.4–12.3)
POTASSIUM SERPL-SCNC: 3.8 MMOL/L (ref 3.5–5.1)
PROT SERPL-MCNC: 5.6 G/DL (ref 6.3–8.2)
PROT UR STRIP-MCNC: ABNORMAL MG/DL
RBC # BLD AUTO: 3.8 M/UL (ref 4.05–5.2)
RBC #/AREA URNS HPF: ABNORMAL /HPF
SODIUM SERPL-SCNC: 139 MMOL/L (ref 136–145)
SP GR UR REFRACTOMETRY: 1.02 (ref 1–1.02)
UROBILINOGEN UR QL STRIP.AUTO: 1 EU/DL (ref 0.2–1)
WBC # BLD AUTO: 10.4 K/UL (ref 4.3–11.1)
WBC URNS QL MICRO: ABNORMAL /HPF

## 2024-09-28 PROCEDURE — 80053 COMPREHEN METABOLIC PANEL: CPT

## 2024-09-28 PROCEDURE — 70450 CT HEAD/BRAIN W/O DYE: CPT

## 2024-09-28 PROCEDURE — 85025 COMPLETE CBC W/AUTO DIFF WBC: CPT

## 2024-09-28 PROCEDURE — 81001 URINALYSIS AUTO W/SCOPE: CPT

## 2024-09-28 PROCEDURE — 72170 X-RAY EXAM OF PELVIS: CPT

## 2024-09-28 PROCEDURE — 71046 X-RAY EXAM CHEST 2 VIEWS: CPT

## 2024-09-28 PROCEDURE — 99284 EMERGENCY DEPT VISIT MOD MDM: CPT

## 2024-09-28 PROCEDURE — 72125 CT NECK SPINE W/O DYE: CPT

## 2024-09-28 RX ORDER — 0.9 % SODIUM CHLORIDE 0.9 %
1000 INTRAVENOUS SOLUTION INTRAVENOUS
Status: DISCONTINUED | OUTPATIENT
Start: 2024-09-28 | End: 2024-09-28 | Stop reason: HOSPADM

## 2024-09-28 NOTE — ED NOTES
Patient mobility status  with difficulty, uses a walker. Provider aware     I have reviewed discharge instructions with the patient and caregiver.  The patient and caregiver verbalized understanding.    Patient left ED via Discharge Method: wheelchair to Home with Child.    Opportunity for questions and clarification provided.     Patient given 0 scripts.       \"

## 2024-09-28 NOTE — ED TRIAGE NOTES
Pt presents via EMS after unwitnessed fall at home.  Unknown if patient hit her head.  Pt is Aox4 and has new brusing around her left eye.  No complaints of pain at this time.  Denies Blood thinner.

## 2024-09-28 NOTE — DISCHARGE INSTRUCTIONS
Your x-rays did not show any fracture or bleeding.  Your laboratory values and urinalysis studies are reassuring.  I recommend taking Tylenol and/or ibuprofen for generalized soreness after the fall today over the next several days as needed.  Drink plenty of fluids.  Rest.  If you have any new or worsening symptoms return to the emergency department for reevaluation.

## 2024-09-30 ENCOUNTER — OFFICE VISIT (OUTPATIENT)
Dept: INTERNAL MEDICINE CLINIC | Facility: CLINIC | Age: 79
End: 2024-09-30
Payer: MEDICARE

## 2024-09-30 VITALS — DIASTOLIC BLOOD PRESSURE: 60 MMHG | SYSTOLIC BLOOD PRESSURE: 124 MMHG | OXYGEN SATURATION: 98 %

## 2024-09-30 DIAGNOSIS — M16.11 PRIMARY OSTEOARTHRITIS OF RIGHT HIP: Primary | ICD-10-CM

## 2024-09-30 DIAGNOSIS — R26.9 GAIT ABNORMALITY: ICD-10-CM

## 2024-09-30 DIAGNOSIS — E66.01 SEVERE OBESITY (BMI 35.0-39.9) WITH COMORBIDITY: ICD-10-CM

## 2024-09-30 PROCEDURE — 1123F ACP DISCUSS/DSCN MKR DOCD: CPT | Performed by: FAMILY MEDICINE

## 2024-09-30 PROCEDURE — 3078F DIAST BP <80 MM HG: CPT | Performed by: FAMILY MEDICINE

## 2024-09-30 PROCEDURE — 99214 OFFICE O/P EST MOD 30 MIN: CPT | Performed by: FAMILY MEDICINE

## 2024-09-30 PROCEDURE — 3074F SYST BP LT 130 MM HG: CPT | Performed by: FAMILY MEDICINE

## 2024-09-30 SDOH — ECONOMIC STABILITY: INCOME INSECURITY: HOW HARD IS IT FOR YOU TO PAY FOR THE VERY BASICS LIKE FOOD, HOUSING, MEDICAL CARE, AND HEATING?: NOT VERY HARD

## 2024-09-30 SDOH — ECONOMIC STABILITY: FOOD INSECURITY: WITHIN THE PAST 12 MONTHS, THE FOOD YOU BOUGHT JUST DIDN'T LAST AND YOU DIDN'T HAVE MONEY TO GET MORE.: NEVER TRUE

## 2024-09-30 SDOH — ECONOMIC STABILITY: FOOD INSECURITY: WITHIN THE PAST 12 MONTHS, YOU WORRIED THAT YOUR FOOD WOULD RUN OUT BEFORE YOU GOT MONEY TO BUY MORE.: NEVER TRUE

## 2024-09-30 NOTE — PROGRESS NOTES
discharged home in wheelchair.  Patient uses a walker normally at home.  Since her fall mobility is much less. Unable to ambulate without assistance. Daughter and son helping for now.  Lives alone.  No headaches. + weakness. Shallow breathing at night per son.  Advised to monitor with pulse oximeter at home.  Follow-up if with decreased.    Review of Systems       Objective   Physical Exam  Constitutional:       Appearance: Normal appearance. She is obese.   HENT:      Head: Normocephalic.   Cardiovascular:      Rate and Rhythm: Normal rate and regular rhythm.      Heart sounds: Normal heart sounds.   Pulmonary:      Effort: Pulmonary effort is normal.      Breath sounds: Normal breath sounds.   Abdominal:      General: Abdomen is flat. Bowel sounds are normal.      Palpations: Abdomen is soft.   Musculoskeletal:      Comments: Patient nonambulatory in wheelchair.   Neurological:      General: No focal deficit present.      Mental Status: She is alert.   Psychiatric:         Mood and Affect: Mood normal.          I spent 30 minutes face-to-face time with patient discussing her illness and placement for her.  I discussed eventually she will need to either moved in with her children which is not possible or go to a nursing home.        An electronic signature was used to authenticate this note.    --Giovany Clayton MD

## 2024-09-30 NOTE — ASSESSMENT & PLAN NOTE
As per patient she has seen Ortho and supposedly told they would not operate on her.  Never got steroid injection.  May have been exacerbated after falling.  Getting physical therapy at home.  Will refer to occupational therapy.  Will also refer for home health aide and  evaluation since with probably need assistance for care at home at this time.    Orders:    [facility] by Ashleigh Venegas

## 2024-10-01 ENCOUNTER — HOSPITAL ENCOUNTER (EMERGENCY)
Age: 79
Discharge: HOME | End: 2024-10-01

## 2024-10-23 ENCOUNTER — OFFICE VISIT (OUTPATIENT)
Dept: INTERNAL MEDICINE CLINIC | Facility: CLINIC | Age: 79
End: 2024-10-23

## 2024-10-23 VITALS
OXYGEN SATURATION: 98 % | HEART RATE: 72 BPM | WEIGHT: 209.8 LBS | BODY MASS INDEX: 36.01 KG/M2 | SYSTOLIC BLOOD PRESSURE: 122 MMHG | DIASTOLIC BLOOD PRESSURE: 68 MMHG

## 2024-10-23 DIAGNOSIS — G89.29 CHRONIC BILATERAL LOW BACK PAIN WITH LEFT-SIDED SCIATICA: ICD-10-CM

## 2024-10-23 DIAGNOSIS — R05.9 COUGH IN ADULT: ICD-10-CM

## 2024-10-23 DIAGNOSIS — K21.9 GASTROESOPHAGEAL REFLUX DISEASE WITHOUT ESOPHAGITIS: ICD-10-CM

## 2024-10-23 DIAGNOSIS — E55.9 VITAMIN D DEFICIENCY: ICD-10-CM

## 2024-10-23 DIAGNOSIS — D50.9 IRON DEFICIENCY ANEMIA, UNSPECIFIED IRON DEFICIENCY ANEMIA TYPE: ICD-10-CM

## 2024-10-23 DIAGNOSIS — K58.0 IRRITABLE BOWEL SYNDROME WITH DIARRHEA: ICD-10-CM

## 2024-10-23 DIAGNOSIS — K58.2 IRRITABLE BOWEL SYNDROME WITH BOTH CONSTIPATION AND DIARRHEA: ICD-10-CM

## 2024-10-23 DIAGNOSIS — I10 PRIMARY HYPERTENSION: ICD-10-CM

## 2024-10-23 DIAGNOSIS — M15.9 OSTEOARTHRITIS OF MULTIPLE JOINTS, UNSPECIFIED OSTEOARTHRITIS TYPE: ICD-10-CM

## 2024-10-23 DIAGNOSIS — M54.42 CHRONIC BILATERAL LOW BACK PAIN WITH LEFT-SIDED SCIATICA: ICD-10-CM

## 2024-10-23 DIAGNOSIS — E78.2 MIXED HYPERLIPIDEMIA: ICD-10-CM

## 2024-10-23 DIAGNOSIS — M10.9 GOUT, UNSPECIFIED CAUSE, UNSPECIFIED CHRONICITY, UNSPECIFIED SITE: ICD-10-CM

## 2024-10-23 DIAGNOSIS — I10 ESSENTIAL HYPERTENSION: Primary | ICD-10-CM

## 2024-10-23 DIAGNOSIS — R19.7 DIARRHEA, UNSPECIFIED TYPE: ICD-10-CM

## 2024-10-23 RX ORDER — BISACODYL 5 MG/1
5 TABLET, DELAYED RELEASE ORAL DAILY PRN
Qty: 90 TABLET | Refills: 3 | Status: SHIPPED | OUTPATIENT
Start: 2024-10-23

## 2024-10-23 RX ORDER — ROSUVASTATIN CALCIUM 5 MG/1
5 TABLET, COATED ORAL NIGHTLY
Qty: 90 TABLET | Refills: 3 | Status: SHIPPED | OUTPATIENT
Start: 2024-10-23

## 2024-10-23 RX ORDER — SENNOSIDES 8.6 MG
650 CAPSULE ORAL 2 TIMES DAILY PRN
Qty: 180 TABLET | Refills: 3 | Status: SHIPPED | OUTPATIENT
Start: 2024-10-23

## 2024-10-23 RX ORDER — CHLORAL HYDRATE 500 MG
1000 CAPSULE ORAL DAILY
COMMUNITY
End: 2024-10-23 | Stop reason: SDUPTHER

## 2024-10-23 RX ORDER — LISINOPRIL 10 MG/1
10 TABLET ORAL 2 TIMES DAILY
Qty: 180 TABLET | Refills: 3 | Status: SHIPPED | OUTPATIENT
Start: 2024-10-23

## 2024-10-23 RX ORDER — LOPERAMIDE HYDROCHLORIDE 2 MG/1
2 CAPSULE ORAL 4 TIMES DAILY PRN
Qty: 240 CAPSULE | Refills: 3 | Status: SHIPPED | OUTPATIENT
Start: 2024-10-23

## 2024-10-23 RX ORDER — CHLORAL HYDRATE 500 MG
1000 CAPSULE ORAL DAILY
Qty: 90 CAPSULE | Refills: 3 | Status: SHIPPED | OUTPATIENT
Start: 2024-10-23

## 2024-10-23 RX ORDER — ALLOPURINOL 300 MG/1
300 TABLET ORAL DAILY
Qty: 90 TABLET | Refills: 3 | Status: SHIPPED | OUTPATIENT
Start: 2024-10-23

## 2024-10-23 RX ORDER — BISACODYL 5 MG/1
5 TABLET, DELAYED RELEASE ORAL DAILY PRN
COMMUNITY
End: 2024-10-23 | Stop reason: SDUPTHER

## 2024-10-23 RX ORDER — LOPERAMIDE HYDROCHLORIDE 2 MG/1
2 CAPSULE ORAL 4 TIMES DAILY PRN
COMMUNITY
End: 2024-10-23 | Stop reason: SDUPTHER

## 2024-10-23 ASSESSMENT — ENCOUNTER SYMPTOMS
DIARRHEA: 1
COUGH: 1
RECTAL PAIN: 0
CONSTIPATION: 1
SHORTNESS OF BREATH: 0
WHEEZING: 0
VOMITING: 0

## 2024-10-23 NOTE — PROGRESS NOTES
HPI    Past Medical History, Past Surgical History, Family history, Social History, and Medications were all reviewed with the patient today and updated as necessary.       Current Outpatient Medications   Medication Sig Dispense Refill    lisinopril (PRINIVIL;ZESTRIL) 10 MG tablet Take 1 tablet by mouth 2 times daily 180 tablet 3    allopurinol (ZYLOPRIM) 300 MG tablet Take 1 tablet by mouth daily 90 tablet 3    omeprazole (PRILOSEC) 20 MG delayed release capsule Take 1 capsule by mouth Daily 90 capsule 3    rosuvastatin (CRESTOR) 5 MG tablet Take 1 tablet by mouth nightly 90 tablet 3    vitamin D (CHOLECALCIFEROL) 125 MCG (5000 UT) CAPS capsule Take 1 capsule by mouth daily 90 capsule 3    acetaminophen (TYLENOL) 650 MG extended release tablet Take 1 tablet by mouth 2 times daily as needed for Pain 180 tablet 3    bisacodyl (DULCOLAX) 5 MG EC tablet Take 1 tablet by mouth daily as needed for Constipation 90 tablet 3    loperamide (IMODIUM) 2 MG capsule Take 1 capsule by mouth 4 times daily as needed for Diarrhea 240 capsule 3    Misc Natural Products (JOINT HEALTH) CAPS Take 125 mg by mouth daily 90 capsule 3    Omega-3 Fatty Acids (FISH OIL) 1000 MG capsule Take 1 capsule by mouth daily 90 capsule 3    NONFORMULARY 2% Miconazole Nitrate external cream  Use as needed on groin      albuterol sulfate HFA (VENTOLIN HFA) 108 (90 Base) MCG/ACT inhaler Inhale 2 puffs into the lungs 4 times daily as needed for Wheezing 18 g 0     No current facility-administered medications for this visit.     Allergies   Allergen Reactions    Hydromorphone Other (See Comments)     Difficulty breathing in recovery room, pt requests no additional use    Adhesive Tape Rash and Other (See Comments)     Only after prolonged periods of time.       Cephalexin Rash    Clindamycin Rash     Patient Active Problem List   Diagnosis    Precordial pain    Edema    Gout    Recurrent incisional hernia with incarceration    Hyperglycemia    Obesity

## 2024-11-05 DIAGNOSIS — E78.2 MIXED HYPERLIPIDEMIA: ICD-10-CM

## 2024-11-06 RX ORDER — ROSUVASTATIN CALCIUM 5 MG/1
5 TABLET, COATED ORAL NIGHTLY
Qty: 90 TABLET | Refills: 1 | Status: SHIPPED | OUTPATIENT
Start: 2024-11-06

## 2024-11-06 NOTE — TELEPHONE ENCOUNTER
[facility] calling for additional directions for miconazole cream; script was printed for patient but cannot indicate use as directed but can indicate use as directed on box

## 2024-11-12 RX ORDER — MICONAZOLE NITRATE 20 MG/G
CREAM TOPICAL
Qty: 42.5 G | Refills: 2 | Status: ON HOLD | OUTPATIENT
Start: 2024-11-12

## 2024-12-01 ENCOUNTER — APPOINTMENT (OUTPATIENT)
Dept: GENERAL RADIOLOGY | Age: 79
End: 2024-12-01
Payer: MEDICARE

## 2024-12-01 ENCOUNTER — HOSPITAL ENCOUNTER (EMERGENCY)
Age: 79
Discharge: HOME | End: 2024-12-01
Attending: EMERGENCY MEDICINE
Payer: MEDICARE

## 2024-12-01 ENCOUNTER — HOSPITAL ENCOUNTER (INPATIENT)
Age: 79
LOS: 3 days | Discharge: NURSING, REHAB OR LONG-TERM CARE | DRG: 689 | End: 2024-12-04
Attending: FAMILY MEDICINE | Admitting: FAMILY MEDICINE
Payer: MEDICARE

## 2024-12-01 VITALS
RESPIRATION RATE: 17 BRPM | DIASTOLIC BLOOD PRESSURE: 54 MMHG | OXYGEN SATURATION: 95 % | HEART RATE: 73 BPM | HEIGHT: 64 IN | BODY MASS INDEX: 36.01 KG/M2 | SYSTOLIC BLOOD PRESSURE: 107 MMHG | TEMPERATURE: 98.2 F

## 2024-12-01 DIAGNOSIS — R55 SYNCOPE, UNSPECIFIED SYNCOPE TYPE: Primary | ICD-10-CM

## 2024-12-01 DIAGNOSIS — R40.4 TRANSIENT ALTERATION OF AWARENESS: ICD-10-CM

## 2024-12-01 DIAGNOSIS — S70.01XA CONTUSION OF RIGHT HIP, INITIAL ENCOUNTER: ICD-10-CM
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
## 2024-12-01 DIAGNOSIS — R26.2 UNABLE TO AMBULATE: ICD-10-CM

## 2024-12-01 DIAGNOSIS — N39.0 URINARY TRACT INFECTION WITHOUT HEMATURIA, SITE UNSPECIFIED: ICD-10-CM

## 2024-12-01 PROBLEM — G93.41 ACUTE METABOLIC ENCEPHALOPATHY: Status: ACTIVE | Noted: 2024-12-01

## 2024-12-01 LAB
ALBUMIN SERPL-MCNC: 3.1 G/DL (ref 3.2–4.6)
ALBUMIN/GLOB SERPL: 1 (ref 1–1.9)
ALP SERPL-CCNC: 88 U/L (ref 35–104)
ALT SERPL-CCNC: 12 U/L (ref 8–45)
ANION GAP SERPL CALC-SCNC: 12 MMOL/L (ref 7–16)
APPEARANCE UR: ABNORMAL
AST SERPL-CCNC: 30 U/L (ref 15–37)
BACTERIA URNS QL MICRO: ABNORMAL /HPF
BASOPHILS # BLD: 0 K/UL (ref 0–0.2)
BASOPHILS NFR BLD: 0 % (ref 0–2)
BILIRUB SERPL-MCNC: 0.9 MG/DL (ref 0–1.2)
BILIRUB UR QL: NEGATIVE
BUN SERPL-MCNC: 26 MG/DL (ref 8–23)
CALCIUM SERPL-MCNC: 9.3 MG/DL (ref 8.8–10.2)
CASTS URNS QL MICRO: ABNORMAL /LPF
CHLORIDE SERPL-SCNC: 99 MMOL/L (ref 98–107)
CO2 SERPL-SCNC: 26 MMOL/L (ref 20–29)
COLOR UR: ABNORMAL
CREAT SERPL-MCNC: 1.12 MG/DL (ref 0.6–1.1)
DIFFERENTIAL METHOD BLD: ABNORMAL
EOSINOPHIL # BLD: 0 K/UL (ref 0–0.8)
EOSINOPHIL NFR BLD: 0 % (ref 0.5–7.8)
EPI CELLS #/AREA URNS HPF: ABNORMAL /HPF
ERYTHROCYTE [DISTWIDTH] IN BLOOD BY AUTOMATED COUNT: 14.6 % (ref 11.9–14.6)
GLOBULIN SER CALC-MCNC: 3.2 G/DL (ref 2.3–3.5)
GLUCOSE SERPL-MCNC: 106 MG/DL (ref 70–99)
GLUCOSE UR STRIP.AUTO-MCNC: NEGATIVE MG/DL
HCT VFR BLD AUTO: 32.1 % (ref 35.8–46.3)
HGB BLD-MCNC: 10.3 G/DL (ref 11.7–15.4)
HGB UR QL STRIP: NEGATIVE
IMM GRANULOCYTES # BLD AUTO: 0 K/UL (ref 0–0.5)
IMM GRANULOCYTES NFR BLD AUTO: 0 % (ref 0–5)
KETONES UR QL STRIP.AUTO: ABNORMAL MG/DL
LACTATE SERPL-SCNC: 0.9 MMOL/L (ref 0.5–2)
LEUKOCYTE ESTERASE UR QL STRIP.AUTO: ABNORMAL
LYMPHOCYTES # BLD: 1 K/UL (ref 0.5–4.6)
LYMPHOCYTES NFR BLD: 11 % (ref 13–44)
MAGNESIUM SERPL-MCNC: 1.9 MG/DL (ref 1.8–2.4)
MCH RBC QN AUTO: 30.4 PG (ref 26.1–32.9)
MCHC RBC AUTO-ENTMCNC: 32.1 G/DL (ref 31.4–35)
MCV RBC AUTO: 94.7 FL (ref 82–102)
MONOCYTES # BLD: 0.8 K/UL (ref 0.1–1.3)
MONOCYTES NFR BLD: 9 % (ref 4–12)
MUCOUS THREADS URNS QL MICRO: ABNORMAL /LPF
NEUTS SEG # BLD: 7.1 K/UL (ref 1.7–8.2)
NEUTS SEG NFR BLD: 79 % (ref 43–78)
NITRITE UR QL STRIP.AUTO: POSITIVE
NRBC # BLD: 0 K/UL (ref 0–0.2)
PH UR STRIP: 5.5 (ref 5–9)
PLATELET # BLD AUTO: 254 K/UL (ref 150–450)
PMV BLD AUTO: 9.7 FL (ref 9.4–12.3)
POTASSIUM SERPL-SCNC: 3.9 MMOL/L (ref 3.5–5.1)
PROCALCITONIN SERPL-MCNC: 0.07 NG/ML (ref 0–0.1)
PROT SERPL-MCNC: 6.3 G/DL (ref 6.3–8.2)
PROT UR STRIP-MCNC: ABNORMAL MG/DL
RBC # BLD AUTO: 3.39 M/UL (ref 4.05–5.2)
SODIUM SERPL-SCNC: 137 MMOL/L (ref 136–145)
SP GR UR REFRACTOMETRY: 1.01 (ref 1–1.02)
UROBILINOGEN UR QL STRIP.AUTO: 1 EU/DL (ref 0.2–1)
WBC # BLD AUTO: 9 K/UL (ref 4.3–11.1)
WBC URNS QL MICRO: ABNORMAL /HPF

## 2024-12-01 PROCEDURE — 84145 PROCALCITONIN (PCT): CPT

## 2024-12-01 PROCEDURE — 99285 EMERGENCY DEPT VISIT HI MDM: CPT

## 2024-12-01 PROCEDURE — 87186 SC STD MICRODIL/AGAR DIL: CPT

## 2024-12-01 PROCEDURE — 93005 ELECTROCARDIOGRAM TRACING: CPT | Performed by: EMERGENCY MEDICINE

## 2024-12-01 PROCEDURE — 6360000002 HC RX W HCPCS: Performed by: FAMILY MEDICINE

## 2024-12-01 PROCEDURE — 6370000000 HC RX 637 (ALT 250 FOR IP): Performed by: FAMILY MEDICINE

## 2024-12-01 PROCEDURE — 87040 BLOOD CULTURE FOR BACTERIA: CPT

## 2024-12-01 PROCEDURE — 73502 X-RAY EXAM HIP UNI 2-3 VIEWS: CPT

## 2024-12-01 PROCEDURE — 73562 X-RAY EXAM OF KNEE 3: CPT

## 2024-12-01 PROCEDURE — 6370000000 HC RX 637 (ALT 250 FOR IP): Performed by: EMERGENCY MEDICINE

## 2024-12-01 PROCEDURE — 81001 URINALYSIS AUTO W/SCOPE: CPT

## 2024-12-01 PROCEDURE — 87086 URINE CULTURE/COLONY COUNT: CPT

## 2024-12-01 PROCEDURE — 87088 URINE BACTERIA CULTURE: CPT

## 2024-12-01 PROCEDURE — 1100000003 HC PRIVATE W/ TELEMETRY

## 2024-12-01 PROCEDURE — 83605 ASSAY OF LACTIC ACID: CPT

## 2024-12-01 PROCEDURE — 85025 COMPLETE CBC W/AUTO DIFF WBC: CPT

## 2024-12-01 PROCEDURE — 83735 ASSAY OF MAGNESIUM: CPT

## 2024-12-01 PROCEDURE — 2580000003 HC RX 258: Performed by: FAMILY MEDICINE

## 2024-12-01 PROCEDURE — 80053 COMPREHEN METABOLIC PANEL: CPT

## 2024-12-01 RX ORDER — ONDANSETRON 4 MG/1
4 TABLET, ORALLY DISINTEGRATING ORAL EVERY 8 HOURS PRN
Status: DISCONTINUED | OUTPATIENT
Start: 2024-12-01 | End: 2024-12-04 | Stop reason: HOSPADM

## 2024-12-01 RX ORDER — MAGNESIUM SULFATE IN WATER 40 MG/ML
2000 INJECTION, SOLUTION INTRAVENOUS PRN
Status: DISCONTINUED | OUTPATIENT
Start: 2024-12-01 | End: 2024-12-04 | Stop reason: HOSPADM

## 2024-12-01 RX ORDER — SODIUM CHLORIDE 0.9 % (FLUSH) 0.9 %
5-40 SYRINGE (ML) INJECTION PRN
Status: DISCONTINUED | OUTPATIENT
Start: 2024-12-01 | End: 2024-12-04 | Stop reason: HOSPADM

## 2024-12-01 RX ORDER — POLYETHYLENE GLYCOL 3350 17 G/17G
17 POWDER, FOR SOLUTION ORAL DAILY PRN
Status: DISCONTINUED | OUTPATIENT
Start: 2024-12-01 | End: 2024-12-04 | Stop reason: HOSPADM

## 2024-12-01 RX ORDER — ROSUVASTATIN CALCIUM 10 MG/1
5 TABLET, COATED ORAL NIGHTLY
Status: DISCONTINUED | OUTPATIENT
Start: 2024-12-01 | End: 2024-12-04 | Stop reason: HOSPADM

## 2024-12-01 RX ORDER — ACETAMINOPHEN 325 MG/1
650 TABLET ORAL EVERY 6 HOURS PRN
Status: DISCONTINUED | OUTPATIENT
Start: 2024-12-01 | End: 2024-12-04 | Stop reason: HOSPADM

## 2024-12-01 RX ORDER — ACETAMINOPHEN 650 MG/1
650 SUPPOSITORY RECTAL EVERY 6 HOURS PRN
Status: DISCONTINUED | OUTPATIENT
Start: 2024-12-01 | End: 2024-12-04 | Stop reason: HOSPADM

## 2024-12-01 RX ORDER — POTASSIUM CHLORIDE 7.45 MG/ML
10 INJECTION INTRAVENOUS PRN
Status: DISCONTINUED | OUTPATIENT
Start: 2024-12-01 | End: 2024-12-04 | Stop reason: HOSPADM

## 2024-12-01 RX ORDER — CIPROFLOXACIN 2 MG/ML
400 INJECTION, SOLUTION INTRAVENOUS EVERY 12 HOURS
Status: DISCONTINUED | OUTPATIENT
Start: 2024-12-01 | End: 2024-12-04 | Stop reason: HOSPADM

## 2024-12-01 RX ORDER — LISINOPRIL 5 MG/1
10 TABLET ORAL 2 TIMES DAILY
Status: DISCONTINUED | OUTPATIENT
Start: 2024-12-01 | End: 2024-12-04

## 2024-12-01 RX ORDER — SODIUM CHLORIDE 9 MG/ML
INJECTION, SOLUTION INTRAVENOUS CONTINUOUS
Status: DISCONTINUED | OUTPATIENT
Start: 2024-12-01 | End: 2024-12-01

## 2024-12-01 RX ORDER — ALLOPURINOL 300 MG/1
300 TABLET ORAL DAILY
Status: DISCONTINUED | OUTPATIENT
Start: 2024-12-01 | End: 2024-12-04 | Stop reason: HOSPADM

## 2024-12-01 RX ORDER — LOPERAMIDE HYDROCHLORIDE 2 MG/1
2 CAPSULE ORAL 4 TIMES DAILY PRN
Status: DISCONTINUED | OUTPATIENT
Start: 2024-12-01 | End: 2024-12-04 | Stop reason: HOSPADM

## 2024-12-01 RX ORDER — ACETAMINOPHEN 500 MG
1000 TABLET ORAL
Status: COMPLETED | OUTPATIENT
Start: 2024-12-01 | End: 2024-12-01

## 2024-12-01 RX ORDER — SODIUM CHLORIDE 0.9 % (FLUSH) 0.9 %
5-40 SYRINGE (ML) INJECTION EVERY 12 HOURS SCHEDULED
Status: DISCONTINUED | OUTPATIENT
Start: 2024-12-01 | End: 2024-12-04 | Stop reason: HOSPADM

## 2024-12-01 RX ORDER — PANTOPRAZOLE SODIUM 40 MG/1
40 TABLET, DELAYED RELEASE ORAL
Status: DISCONTINUED | OUTPATIENT
Start: 2024-12-01 | End: 2024-12-04 | Stop reason: HOSPADM

## 2024-12-01 RX ORDER — SODIUM CHLORIDE 9 MG/ML
INJECTION, SOLUTION INTRAVENOUS PRN
Status: DISCONTINUED | OUTPATIENT
Start: 2024-12-01 | End: 2024-12-04 | Stop reason: HOSPADM

## 2024-12-01 RX ORDER — ONDANSETRON 2 MG/ML
4 INJECTION INTRAMUSCULAR; INTRAVENOUS EVERY 6 HOURS PRN
Status: DISCONTINUED | OUTPATIENT
Start: 2024-12-01 | End: 2024-12-04 | Stop reason: HOSPADM

## 2024-12-01 RX ORDER — POTASSIUM CHLORIDE 1500 MG/1
40 TABLET, EXTENDED RELEASE ORAL PRN
Status: DISCONTINUED | OUTPATIENT
Start: 2024-12-01 | End: 2024-12-04 | Stop reason: HOSPADM

## 2024-12-01 RX ORDER — SULFAMETHOXAZOLE AND TRIMETHOPRIM 800; 160 MG/1; MG/1
1 TABLET ORAL
Status: COMPLETED | OUTPATIENT
Start: 2024-12-01 | End: 2024-12-01

## 2024-12-01 RX ORDER — ENOXAPARIN SODIUM 100 MG/ML
40 INJECTION SUBCUTANEOUS DAILY
Status: DISCONTINUED | OUTPATIENT
Start: 2024-12-01 | End: 2024-12-04 | Stop reason: HOSPADM

## 2024-12-01 RX ADMIN — CIPROFLOXACIN 400 MG: 400 INJECTION, SOLUTION INTRAVENOUS at 21:17

## 2024-12-01 RX ADMIN — ENOXAPARIN SODIUM 40 MG: 100 INJECTION SUBCUTANEOUS at 09:57

## 2024-12-01 RX ADMIN — ACETAMINOPHEN 650 MG: 325 TABLET ORAL at 21:06

## 2024-12-01 RX ADMIN — SODIUM CHLORIDE, PRESERVATIVE FREE 10 ML: 5 INJECTION INTRAVENOUS at 21:13

## 2024-12-01 RX ADMIN — SODIUM CHLORIDE, PRESERVATIVE FREE 10 ML: 5 INJECTION INTRAVENOUS at 10:05

## 2024-12-01 RX ADMIN — PANTOPRAZOLE SODIUM 40 MG: 40 TABLET, DELAYED RELEASE ORAL at 10:04

## 2024-12-01 RX ADMIN — SODIUM CHLORIDE: 9 INJECTION, SOLUTION INTRAVENOUS at 10:09

## 2024-12-01 RX ADMIN — LISINOPRIL 10 MG: 5 TABLET ORAL at 21:08

## 2024-12-01 RX ADMIN — SULFAMETHOXAZOLE AND TRIMETHOPRIM 1 TABLET: 800; 160 TABLET ORAL at 03:13

## 2024-12-01 RX ADMIN — ALLOPURINOL 300 MG: 300 TABLET ORAL at 10:01

## 2024-12-01 RX ADMIN — CIPROFLOXACIN 400 MG: 400 INJECTION, SOLUTION INTRAVENOUS at 09:54

## 2024-12-01 RX ADMIN — ACETAMINOPHEN 1000 MG: 500 TABLET, FILM COATED ORAL at 03:57

## 2024-12-01 RX ADMIN — ROSUVASTATIN CALCIUM 5 MG: 10 TABLET, FILM COATED ORAL at 21:06

## 2024-12-01 ASSESSMENT — PAIN DESCRIPTION - LOCATION
LOCATION: HIP
LOCATION: HEAD
LOCATION: HIP;KNEE

## 2024-12-01 ASSESSMENT — PAIN - FUNCTIONAL ASSESSMENT: PAIN_FUNCTIONAL_ASSESSMENT: 0-10

## 2024-12-01 ASSESSMENT — PAIN SCALES - GENERAL
PAINLEVEL_OUTOF10: 2
PAINLEVEL_OUTOF10: 3
PAINLEVEL_OUTOF10: 6

## 2024-12-01 ASSESSMENT — PAIN DESCRIPTION - ORIENTATION
ORIENTATION: RIGHT
ORIENTATION: RIGHT

## 2024-12-01 ASSESSMENT — PAIN DESCRIPTION - DESCRIPTORS: DESCRIPTORS: ACHING

## 2024-12-01 NOTE — ED PROVIDER NOTES
Emergency Department Provider Note       PCP: Thor Alonzo PA   Age: 78 y.o.   Sex: female     DISPOSITION Decision To Transfer 12/01/2024 05:11:23 AM    ICD-10-CM    1. Fall, initial encounter  W19.XXXA       2. Contusion of right hip, initial encounter  S70.01XA       3. Urinary tract infection without hematuria, site unspecified  N39.0       4. Transient alteration of awareness  R40.4       5. Unable to ambulate  R26.2           Medical Decision Making     78-year-old  female with a history of morbid obesity, osteoarthritis, hyperlipidemia, HTN, GERD and IBS presents to the emergency department complaining of right hip pain status post fall.  Patient states she was returning from the bathroom and attempting to get into the bed but became more lightheaded and not sure if she passed out or not, but fell to the floor suffering an injury to her right hip.  She was unable to get up and pressed her call button.  According the daughter, they did send off a urinalysis 4 days ago for symptoms of lightheadedness and weakness, but due to the holiday has not come back yet.  No known fevers at home.  Denies any change in bowel habits, melena or medic easier.  Denies any numbness or tingling.  On exam, the patient has no focal neurodeficits.  Hip and knee on the right are fairly tender with decreased range of motion, to the point where the patient has significant discomfort with even attempts to sit up.  Patient thinks there is a dog in the bed with her, and there is something else crawling up the door to the room.  Attempts to minimally change the patient's position are met with severe pain.  CBC shows a normal white count of 9 with a mild left shift, electrolytes are unremarkable except for a BUN of 26 with a creatinine of 1.12 and cath UA is concerning for UTI with WBC 20-50 and 2+ bacteria which was sent for culture.  EKG revealed no evidence of ectopy and magnesium was 1.9.  X-rays of the right hip reveal

## 2024-12-01 NOTE — H&P
[facility] Initial History and Physical Note    Patient: Fanny Cho Date: 12/1/2024  female, 78 y.o.  Admit Date: (Not on file)  Attending: Mili Kan MD     ASSESSMENT AND PLAN:     Principal Problem:    Acute metabolic encephalopathy  Plan: Secondary to UTI. IV Cipro. IVF  Active Problems:    Fall  Plan: PT/OT consults.    Pre-syncope  Plan: IVF, follow on remote tele. TTE. Follow     UTI (urinary tract infection)  Plan: IV Cipro. IVF. Urine/blood cultures pending.    Dyslipidemia  Plan: Stable. Continue home meds.    Iron deficiency anemia  Plan: Stable. Continue home meds.    Mixed hyperlipidemia  Plan: Stable. Continue home meds.    Obesity, morbid  Plan: Stable    Essential hypertension  Plan: Stable. Continue home meds.         DVT Prophylaxis: Lovenox      Code Status: FULL CODE      Disposition: Admit to remote tele for evaluation and treatment as per above.      Anticipated discharge: 2-3 days     CHIEF COMPLAINT:  fall    HISTORY OF PRESENT ILLNESS:      Patient Active Problem List   Diagnosis    Precordial pain    Edema    Gout    Recurrent incisional hernia with incarceration    Hyperglycemia    Other screening mammogram    Mixed hyperlipidemia    Menopause    IBS (irritable bowel syndrome)    Osteoarthritis    S/P hernia repair    Total knee replacement status    Obesity, morbid    Osteoarthritis of left knee    Superior glenoid labrum lesion    Vitamin D deficiency    Anemia associated with acute blood loss    Chronic bilateral low back pain with left-sided sciatica    Chronic pain of left knee    Dyslipidemia    Iron deficiency anemia    Macular RPE mottling    Myopia with presbyopia of both eyes    Osteopenia of multiple sites    PCO (posterior capsular opacification), bilateral    Posterior vitreous detachment of right eye    Premature supraventricular beats    Venous stasis dermatitis of both lower extremities    Lipodermatosclerosis of both lower extremities    Essential

## 2024-12-01 NOTE — ED TRIAGE NOTES
To ED via EMS from the [facility], had fall within the last hour, did hit her life alert. EMS alerted for R hip pain, pt also has chronic back pain. Pain 3/10 after lift assist from the floor. FSBS 129    Daughter states pt had some confusion earlier today. UA was sent out but no results yet.

## 2024-12-01 NOTE — ED NOTES
TRANSFER - OUT REPORT:    Verbal report given to FOUZIA Giraldo on Fanny Cho  being [place] for routine progression of patient care       Report consisted of patient's Situation, Background, Assessment and   Recommendations(SBAR).     Information from the following report(s) Nurse Handoff Report, ED Encounter Summary, ED SBAR, Adult Overview, Intake/Output, MAR, Recent Results, and Neuro Assessment was reviewed with the receiving nurse.    [place] Fall Assessment:    Presents to emergency department  because of falls (Syncope, seizure, or loss of consciousness): Yes  Age > 70: Yes  Altered Mental Status, Intoxication with alcohol or substance confusion (Disorientation, impaired judgment, poor safety awaremess, or inability to follow instructions): No  Impaired Mobility: Ambulates or transfers with assistive devices or assistance; Unable to ambulate or transer.: Yes  Nursing Judgement: Yes          Lines:   Peripheral IV 12/01/24 Right Antecubital (Active)   Site Assessment Clean, dry & intact 12/01/24 0529   Line Status Blood return noted;Normal saline locked 12/01/24 0529   Phlebitis Assessment No symptoms 12/01/24 0529   Infiltration Assessment 0 12/01/24 0529   Dressing Status Clean, dry & intact 12/01/24 0529   Dressing Type Transparent 12/01/24 0529        Opportunity for questions and clarification was provided.      Patient transported with:  [employer]

## 2024-12-02 ENCOUNTER — APPOINTMENT (OUTPATIENT)
Dept: NON INVASIVE DIAGNOSTICS | Age: 79
DRG: 689 | End: 2024-12-02
Attending: FAMILY MEDICINE
Payer: MEDICARE

## 2024-12-02 PROBLEM — D64.9 NORMOCYTIC ANEMIA: Status: ACTIVE | Noted: 2024-12-02

## 2024-12-02 PROBLEM — E87.6 HYPOKALEMIA: Status: ACTIVE | Noted: 2024-12-02

## 2024-12-02 LAB
ALBUMIN SERPL-MCNC: 2.2 G/DL (ref 3.2–4.6)
ALBUMIN/GLOB SERPL: 0.8 (ref 1–1.9)
ALP SERPL-CCNC: 70 U/L (ref 35–104)
ALT SERPL-CCNC: 11 U/L (ref 8–45)
ANION GAP SERPL CALC-SCNC: 9 MMOL/L (ref 7–16)
AST SERPL-CCNC: 32 U/L (ref 15–37)
BASOPHILS # BLD: 0.1 K/UL (ref 0–0.2)
BASOPHILS NFR BLD: 1 % (ref 0–2)
BILIRUB SERPL-MCNC: 0.5 MG/DL (ref 0–1.2)
BUN SERPL-MCNC: 15 MG/DL (ref 8–23)
CALCIUM SERPL-MCNC: 8.3 MG/DL (ref 8.8–10.2)
CHLORIDE SERPL-SCNC: 105 MMOL/L (ref 98–107)
CO2 SERPL-SCNC: 25 MMOL/L (ref 20–29)
CREAT SERPL-MCNC: 0.88 MG/DL (ref 0.6–1.1)
DIFFERENTIAL METHOD BLD: ABNORMAL
ECHO AO ASC DIAM: 3.1 CM
ECHO AO ASCENDING AORTA INDEX: 1.53 CM/M2
ECHO AO ROOT DIAM: 3 CM
ECHO AO ROOT INDEX: 1.48 CM/M2
ECHO AV AREA PEAK VELOCITY: 2.4 CM2
ECHO AV AREA VTI: 2.5 CM2
ECHO AV AREA/BSA PEAK VELOCITY: 1.2 CM2/M2
ECHO AV AREA/BSA VTI: 1.2 CM2/M2
ECHO AV MEAN GRADIENT: 6 MMHG
ECHO AV MEAN VELOCITY: 1.1 M/S
ECHO AV PEAK GRADIENT: 10 MMHG
ECHO AV PEAK VELOCITY: 1.6 M/S
ECHO AV VELOCITY RATIO: 0.75
ECHO AV VTI: 37.9 CM
ECHO BSA: 2.11 M2
ECHO EST RA PRESSURE: 3 MMHG
ECHO IVC PROX: 2 CM
ECHO LA AREA 2C: 21.6 CM2
ECHO LA AREA 4C: 18.8 CM2
ECHO LA DIAMETER INDEX: 1.67 CM/M2
ECHO LA DIAMETER: 3.4 CM
ECHO LA MAJOR AXIS: 6.1 CM
ECHO LA MINOR AXIS: 6.9 CM
ECHO LA TO AORTIC ROOT RATIO: 1.13
ECHO LA VOL BP: 54 ML (ref 22–52)
ECHO LA VOL MOD A2C: 57 ML (ref 22–52)
ECHO LA VOL MOD A4C: 45 ML (ref 22–52)
ECHO LA VOL/BSA BIPLANE: 27 ML/M2 (ref 16–34)
ECHO LA VOLUME INDEX MOD A2C: 28 ML/M2 (ref 16–34)
ECHO LA VOLUME INDEX MOD A4C: 22 ML/M2 (ref 16–34)
ECHO LV E' LATERAL VELOCITY: 15.2 CM/S
ECHO LV E' SEPTAL VELOCITY: 9.14 CM/S
ECHO LV EDV A2C: 140 ML
ECHO LV EDV A4C: 130 ML
ECHO LV EDV INDEX A4C: 64 ML/M2
ECHO LV EDV NDEX A2C: 69 ML/M2
ECHO LV EJECTION FRACTION A2C: 54 %
ECHO LV EJECTION FRACTION A4C: 54 %
ECHO LV EJECTION FRACTION BIPLANE: 54 % (ref 55–100)
ECHO LV ESV A2C: 65 ML
ECHO LV ESV A4C: 60 ML
ECHO LV ESV INDEX A2C: 32 ML/M2
ECHO LV ESV INDEX A4C: 30 ML/M2
ECHO LV FRACTIONAL SHORTENING: 25 % (ref 28–44)
ECHO LV INTERNAL DIMENSION DIASTOLE INDEX: 2.51 CM/M2
ECHO LV INTERNAL DIMENSION DIASTOLIC: 5.1 CM (ref 3.9–5.3)
ECHO LV INTERNAL DIMENSION SYSTOLIC INDEX: 1.87 CM/M2
ECHO LV INTERNAL DIMENSION SYSTOLIC: 3.8 CM
ECHO LV IVSD: 0.8 CM (ref 0.6–0.9)
ECHO LV MASS 2D: 140.5 G (ref 67–162)
ECHO LV MASS INDEX 2D: 69.2 G/M2 (ref 43–95)
ECHO LV POSTERIOR WALL DIASTOLIC: 0.8 CM (ref 0.6–0.9)
ECHO LV RELATIVE WALL THICKNESS RATIO: 0.31
ECHO LVOT AREA: 3.1 CM2
ECHO LVOT AV VTI INDEX: 0.79
ECHO LVOT DIAM: 2 CM
ECHO LVOT MEAN GRADIENT: 3 MMHG
ECHO LVOT MEAN GRADIENT: 3 MMHG
ECHO LVOT PEAK GRADIENT: 6 MMHG
ECHO LVOT PEAK VELOCITY: 1.2 M/S
ECHO LVOT STROKE VOLUME INDEX: 46.1 ML/M2
ECHO LVOT SV: 93.6 ML
ECHO LVOT VTI: 29.8 CM
ECHO MV A VELOCITY: 1.05 M/S
ECHO MV AREA VTI: 2.7 CM2
ECHO MV E DECELERATION TIME (DT): 238 MS
ECHO MV E VELOCITY: 1 M/S
ECHO MV E/A RATIO: 0.95
ECHO MV E/E' LATERAL: 6.58
ECHO MV E/E' RATIO (AVERAGED): 8.76
ECHO MV E/E' SEPTAL: 10.94
ECHO MV LVOT VTI INDEX: 1.16
ECHO MV MAX VELOCITY: 1.1 M/S
ECHO MV MEAN GRADIENT: 3 MMHG
ECHO MV MEAN VELOCITY: 0.8 M/S
ECHO MV PEAK GRADIENT: 5 MMHG
ECHO MV VTI: 34.7 CM
ECHO PV ACCELERATION TIME (AT): 145 MS
ECHO PV MAX VELOCITY: 1.1 M/S
ECHO PV PEAK GRADIENT: 4 MMHG
ECHO RIGHT VENTRICULAR SYSTOLIC PRESSURE (RVSP): 26 MMHG
ECHO RV BASAL DIMENSION: 3.1 CM
ECHO RV FREE WALL PEAK S': 15.2 CM/S
ECHO RV TAPSE: 1.8 CM (ref 1.7–?)
ECHO TV REGURGITANT MAX VELOCITY: 2.39 M/S
ECHO TV REGURGITANT PEAK GRADIENT: 23 MMHG
EKG ATRIAL RATE: 161 BPM
EKG DIAGNOSIS: NORMAL
EKG P AXIS: 118 DEGREES
EKG P-R INTERVAL: 186 MS
EKG Q-T INTERVAL: 361 MS
EKG QRS DURATION: 154 MS
EKG QTC CALCULATION (BAZETT): 447 MS
EKG R AXIS: 88 DEGREES
EKG T AXIS: 86 DEGREES
EKG VENTRICULAR RATE: 92 BPM
EOSINOPHIL # BLD: 0.2 K/UL (ref 0–0.8)
EOSINOPHIL NFR BLD: 3 % (ref 0.5–7.8)
ERYTHROCYTE [DISTWIDTH] IN BLOOD BY AUTOMATED COUNT: 14.9 % (ref 11.9–14.6)
GLOBULIN SER CALC-MCNC: 2.8 G/DL (ref 2.3–3.5)
GLUCOSE SERPL-MCNC: 94 MG/DL (ref 70–99)
HCT VFR BLD AUTO: 28.9 % (ref 35.8–46.3)
HGB BLD-MCNC: 9.2 G/DL (ref 11.7–15.4)
IMM GRANULOCYTES # BLD AUTO: 0 K/UL (ref 0–0.5)
IMM GRANULOCYTES NFR BLD AUTO: 0 % (ref 0–5)
LYMPHOCYTES # BLD: 1.3 K/UL (ref 0.5–4.6)
LYMPHOCYTES NFR BLD: 23 % (ref 13–44)
MAGNESIUM SERPL-MCNC: 1.8 MG/DL (ref 1.8–2.4)
MCH RBC QN AUTO: 31.1 PG (ref 26.1–32.9)
MCHC RBC AUTO-ENTMCNC: 31.8 G/DL (ref 31.4–35)
MCV RBC AUTO: 97.6 FL (ref 82–102)
MONOCYTES # BLD: 0.6 K/UL (ref 0.1–1.3)
MONOCYTES NFR BLD: 10 % (ref 4–12)
NEUTS SEG # BLD: 3.7 K/UL (ref 1.7–8.2)
NEUTS SEG NFR BLD: 63 % (ref 43–78)
NRBC # BLD: 0 K/UL (ref 0–0.2)
PLATELET # BLD AUTO: 187 K/UL (ref 150–450)
PMV BLD AUTO: 9.5 FL (ref 9.4–12.3)
POTASSIUM SERPL-SCNC: 3.3 MMOL/L (ref 3.5–5.1)
PROT SERPL-MCNC: 5 G/DL (ref 6.3–8.2)
RBC # BLD AUTO: 2.96 M/UL (ref 4.05–5.2)
SODIUM SERPL-SCNC: 139 MMOL/L (ref 136–145)
WBC # BLD AUTO: 5.8 K/UL (ref 4.3–11.1)

## 2024-12-02 PROCEDURE — 36415 COLL VENOUS BLD VENIPUNCTURE: CPT

## 2024-12-02 PROCEDURE — 97112 NEUROMUSCULAR REEDUCATION: CPT

## 2024-12-02 PROCEDURE — 2580000003 HC RX 258: Performed by: FAMILY MEDICINE

## 2024-12-02 PROCEDURE — 85025 COMPLETE CBC W/AUTO DIFF WBC: CPT

## 2024-12-02 PROCEDURE — 6360000002 HC RX W HCPCS: Performed by: FAMILY MEDICINE

## 2024-12-02 PROCEDURE — 97166 OT EVAL MOD COMPLEX 45 MIN: CPT

## 2024-12-02 PROCEDURE — 1100000003 HC PRIVATE W/ TELEMETRY

## 2024-12-02 PROCEDURE — 6360000004 HC RX CONTRAST MEDICATION: Performed by: FAMILY MEDICINE

## 2024-12-02 PROCEDURE — 80053 COMPREHEN METABOLIC PANEL: CPT

## 2024-12-02 PROCEDURE — 83735 ASSAY OF MAGNESIUM: CPT

## 2024-12-02 PROCEDURE — 97162 PT EVAL MOD COMPLEX 30 MIN: CPT

## 2024-12-02 PROCEDURE — 6370000000 HC RX 637 (ALT 250 FOR IP): Performed by: FAMILY MEDICINE

## 2024-12-02 PROCEDURE — 97530 THERAPEUTIC ACTIVITIES: CPT

## 2024-12-02 PROCEDURE — 93010 ELECTROCARDIOGRAM REPORT: CPT | Performed by: INTERNAL MEDICINE

## 2024-12-02 PROCEDURE — C8929 TTE W OR WO FOL WCON,DOPPLER: HCPCS

## 2024-12-02 PROCEDURE — 97535 SELF CARE MNGMENT TRAINING: CPT

## 2024-12-02 PROCEDURE — 51798 US URINE CAPACITY MEASURE: CPT

## 2024-12-02 PROCEDURE — 93306 TTE W/DOPPLER COMPLETE: CPT | Performed by: INTERNAL MEDICINE

## 2024-12-02 RX ADMIN — LISINOPRIL 10 MG: 5 TABLET ORAL at 21:18

## 2024-12-02 RX ADMIN — LISINOPRIL 10 MG: 5 TABLET ORAL at 08:45

## 2024-12-02 RX ADMIN — CIPROFLOXACIN 400 MG: 400 INJECTION, SOLUTION INTRAVENOUS at 21:23

## 2024-12-02 RX ADMIN — POTASSIUM CHLORIDE 40 MEQ: 1500 TABLET, EXTENDED RELEASE ORAL at 08:45

## 2024-12-02 RX ADMIN — CIPROFLOXACIN 400 MG: 400 INJECTION, SOLUTION INTRAVENOUS at 08:53

## 2024-12-02 RX ADMIN — ENOXAPARIN SODIUM 40 MG: 100 INJECTION SUBCUTANEOUS at 08:45

## 2024-12-02 RX ADMIN — SODIUM CHLORIDE, PRESERVATIVE FREE 0.33 ML: 5 INJECTION INTRAVENOUS at 15:12

## 2024-12-02 RX ADMIN — SODIUM CHLORIDE, PRESERVATIVE FREE 10 ML: 5 INJECTION INTRAVENOUS at 21:19

## 2024-12-02 RX ADMIN — ROSUVASTATIN CALCIUM 5 MG: 10 TABLET, FILM COATED ORAL at 21:18

## 2024-12-02 RX ADMIN — ALLOPURINOL 300 MG: 300 TABLET ORAL at 08:45

## 2024-12-02 RX ADMIN — PANTOPRAZOLE SODIUM 40 MG: 40 TABLET, DELAYED RELEASE ORAL at 06:27

## 2024-12-02 RX ADMIN — SODIUM CHLORIDE, PRESERVATIVE FREE 10 ML: 5 INJECTION INTRAVENOUS at 08:54

## 2024-12-02 ASSESSMENT — PAIN SCALES - GENERAL: PAINLEVEL_OUTOF10: 0

## 2024-12-02 NOTE — CARE COORDINATION
RNCM met with patient in room 205 to discuss discharge planning.    CM assessment completed. Patient lives at [facility]. Patient ambulates independently at baseline, but has a walker, rollator, and wheelchair if needed. Family provides transportation to appointments. Demographics and PCP verified.    Therapy recommends STR at discharge. Referral made to [facility]. CM following.    Update 1502:Patient accepted to [facility]. Insurance prior auth pending. Auth ID #[id number].   12/02/24 1208   Service Assessment   Patient Orientation Alert and Oriented   Cognition Alert   History Provided By Patient   Primary Caregiver Self   Accompanied By/Relationship n/a   Support Systems Children   Patient's Healthcare Decision Maker is: Legal Next of Kin   PCP Verified by CM Yes   Prior Functional Level Independent in ADLs/IADLs   Current Functional Level Independent in ADLs/IADLs   Can patient return to prior living arrangement Yes   Ability to make needs known: Good   Family able to assist with home care needs: Yes   Would you like for me to discuss the discharge plan with any other family members/significant others, and if so, who? No   Financial Resources Medicare   Community Resources Other (Comment)  (Independent Living)   Social/Functional History   Lives With Alone   Type of Home Senior housing apartment  (Independent Living)   Home Equipment Rollator;Wheelchair - Manual;Walker - Rolling   Receives Help From Family;Other (comment)  (Independent living staff)   ADL Assistance Independent   Ambulation Assistance Independent   Transfer Assistance Independent   Active  No   Mode of Transportation Family   Occupation Retired   Discharge Planning   Type of Residence Independent Living   Living Arrangements Alone   Current Services Prior To Admission Other (Comment)  (Physical Therapy)   Potential Assistance Needed Skilled Nursing Facility   Potential Assistance Purchasing

## 2024-12-02 NOTE — THERAPY EVALUATION
ACUTE PHYSICAL THERAPY GOALS:   (Developed with and agreed upon by patient and/or caregiver.)  (1.) Fanny Cho  will move from supine to sit and sit to supine , scoot up and down, and roll side to side with CONTACT GUARD ASSIST within 7 treatment day(s).    (2.) Fanny Cho will transfer from bed to chair and chair to bed with CONTACT GUARD ASSIST using the least restrictive device within 7 treatment day(s).    (3.) Fanny Cho will ambulate with CONTACT GUARD ASSIST for 100 feet with the least restrictive device within 7 treatment day(s).   (4.) Fanny Cho will perform standing static and dynamic balance activities x 15 minutes with CONTACT GUARD ASSIST to improve safety within 7 treatment day(s).  (5.) Fanny Cho will perform therapeutic exercises x 20 min for HEP with INDEPENDENCE to improve strength, endurance, and functional mobility within 7 treatment day(s).     PHYSICAL THERAPY Initial Assessment, Daily Note, and AM  (Link to Caseload Tracking: PT Visit Days : 1  Acknowledge Orders  Time In/Out  PT Charge Capture  Rehab Caseload Tracker    Fanny Cho is a 78 y.o. female   PRIMARY DIAGNOSIS: Acute metabolic encephalopathy  Acute metabolic encephalopathy [G93.41]     Reason for Referral: Generalized Muscle Weakness (M62.81)  Other lack of cordination (R27.8)  Difficulty in walking, Not elsewhere classified (R26.2)  Other abnormalities of gait and mobility (R26.89)  History of falling (Z91.81)  Inpatient: Payor: [facility] MEDICARE / Plan: [facility] MEDICARE-ADVANTAGE PPO / Product Type: Medicare /     ASSESSMENT:     REHAB RECOMMENDATIONS:   Recommendation to date pending progress:  Setting:  Short-term Rehab    Equipment:    To Be Determined     ASSESSMENT:    Ms. Cho is a 78 year old F who presents to hospital from [facility] (Independent Living) after a fall, experiencing some pain since the fall. Imaging negative for acute fx. Pt also with UTI, intermittent

## 2024-12-03 LAB
ALBUMIN SERPL-MCNC: 2.2 G/DL (ref 3.2–4.6)
ALBUMIN/GLOB SERPL: 0.8 (ref 1–1.9)
ALP SERPL-CCNC: 69 U/L (ref 35–104)
ALT SERPL-CCNC: 12 U/L (ref 8–45)
ANION GAP SERPL CALC-SCNC: 10 MMOL/L (ref 7–16)
AST SERPL-CCNC: 29 U/L (ref 15–37)
BASOPHILS # BLD: 0.1 K/UL (ref 0–0.2)
BASOPHILS NFR BLD: 2 % (ref 0–2)
BILIRUB SERPL-MCNC: 0.4 MG/DL (ref 0–1.2)
BUN SERPL-MCNC: 12 MG/DL (ref 8–23)
CALCIUM SERPL-MCNC: 8.1 MG/DL (ref 8.8–10.2)
CHLORIDE SERPL-SCNC: 104 MMOL/L (ref 98–107)
CO2 SERPL-SCNC: 23 MMOL/L (ref 20–29)
CREAT SERPL-MCNC: 0.77 MG/DL (ref 0.6–1.1)
DIFFERENTIAL METHOD BLD: ABNORMAL
EOSINOPHIL # BLD: 0.1 K/UL (ref 0–0.8)
EOSINOPHIL NFR BLD: 3 % (ref 0.5–7.8)
ERYTHROCYTE [DISTWIDTH] IN BLOOD BY AUTOMATED COUNT: 14.9 % (ref 11.9–14.6)
GLOBULIN SER CALC-MCNC: 2.8 G/DL (ref 2.3–3.5)
GLUCOSE SERPL-MCNC: 95 MG/DL (ref 70–99)
HCT VFR BLD AUTO: 28.2 % (ref 35.8–46.3)
HGB BLD-MCNC: 9 G/DL (ref 11.7–15.4)
IMM GRANULOCYTES # BLD AUTO: 0 K/UL (ref 0–0.5)
IMM GRANULOCYTES NFR BLD AUTO: 0 % (ref 0–5)
LYMPHOCYTES # BLD: 1.4 K/UL (ref 0.5–4.6)
LYMPHOCYTES NFR BLD: 31 % (ref 13–44)
MCH RBC QN AUTO: 30.8 PG (ref 26.1–32.9)
MCHC RBC AUTO-ENTMCNC: 31.9 G/DL (ref 31.4–35)
MCV RBC AUTO: 96.6 FL (ref 82–102)
MONOCYTES # BLD: 0.5 K/UL (ref 0.1–1.3)
MONOCYTES NFR BLD: 11 % (ref 4–12)
NEUTS SEG # BLD: 2.5 K/UL (ref 1.7–8.2)
NEUTS SEG NFR BLD: 53 % (ref 43–78)
NRBC # BLD: 0 K/UL (ref 0–0.2)
PLATELET # BLD AUTO: 186 K/UL (ref 150–450)
PMV BLD AUTO: 9.6 FL (ref 9.4–12.3)
POTASSIUM SERPL-SCNC: 3.9 MMOL/L (ref 3.5–5.1)
PROT SERPL-MCNC: 4.9 G/DL (ref 6.3–8.2)
RBC # BLD AUTO: 2.92 M/UL (ref 4.05–5.2)
SODIUM SERPL-SCNC: 136 MMOL/L (ref 136–145)
WBC # BLD AUTO: 4.6 K/UL (ref 4.3–11.1)

## 2024-12-03 PROCEDURE — 2580000003 HC RX 258: Performed by: FAMILY MEDICINE

## 2024-12-03 PROCEDURE — 76937 US GUIDE VASCULAR ACCESS: CPT

## 2024-12-03 PROCEDURE — 97112 NEUROMUSCULAR REEDUCATION: CPT

## 2024-12-03 PROCEDURE — 97535 SELF CARE MNGMENT TRAINING: CPT

## 2024-12-03 PROCEDURE — 6370000000 HC RX 637 (ALT 250 FOR IP): Performed by: FAMILY MEDICINE

## 2024-12-03 PROCEDURE — 97530 THERAPEUTIC ACTIVITIES: CPT

## 2024-12-03 PROCEDURE — 85025 COMPLETE CBC W/AUTO DIFF WBC: CPT

## 2024-12-03 PROCEDURE — 80053 COMPREHEN METABOLIC PANEL: CPT

## 2024-12-03 PROCEDURE — 1100000000 HC RM PRIVATE

## 2024-12-03 PROCEDURE — 36415 COLL VENOUS BLD VENIPUNCTURE: CPT

## 2024-12-03 PROCEDURE — 6360000002 HC RX W HCPCS: Performed by: FAMILY MEDICINE

## 2024-12-03 RX ADMIN — ROSUVASTATIN CALCIUM 5 MG: 10 TABLET, FILM COATED ORAL at 19:49

## 2024-12-03 RX ADMIN — PANTOPRAZOLE SODIUM 40 MG: 40 TABLET, DELAYED RELEASE ORAL at 06:22

## 2024-12-03 RX ADMIN — ALLOPURINOL 300 MG: 300 TABLET ORAL at 08:49

## 2024-12-03 RX ADMIN — LISINOPRIL 10 MG: 5 TABLET ORAL at 19:49

## 2024-12-03 RX ADMIN — CIPROFLOXACIN 400 MG: 400 INJECTION, SOLUTION INTRAVENOUS at 20:42

## 2024-12-03 RX ADMIN — SODIUM CHLORIDE, PRESERVATIVE FREE 10 ML: 5 INJECTION INTRAVENOUS at 19:51

## 2024-12-03 RX ADMIN — CIPROFLOXACIN 400 MG: 400 INJECTION, SOLUTION INTRAVENOUS at 08:53

## 2024-12-03 RX ADMIN — ENOXAPARIN SODIUM 40 MG: 100 INJECTION SUBCUTANEOUS at 08:48

## 2024-12-03 RX ADMIN — ACETAMINOPHEN 650 MG: 325 TABLET ORAL at 12:32

## 2024-12-03 ASSESSMENT — PAIN DESCRIPTION - DESCRIPTORS
DESCRIPTORS: ACHING
DESCRIPTORS: SORE;ACHING

## 2024-12-03 ASSESSMENT — PAIN SCALES - GENERAL
PAINLEVEL_OUTOF10: 3
PAINLEVEL_OUTOF10: 0
PAINLEVEL_OUTOF10: 4

## 2024-12-03 ASSESSMENT — PAIN DESCRIPTION - LOCATION
LOCATION: GENERALIZED;KNEE
LOCATION: GENERALIZED

## 2024-12-03 NOTE — WOUND CARE
Pt seen for left buttocks wound documented as present on admission. Pt able to turn to right side noted on left buttocks small open area most consistent with friction/shearing injury. Recommend zinc barrier cream applied daily and diligent incontinence care to keep dry. Wound team will continue to follow while in acute care setting.

## 2024-12-04 VITALS
SYSTOLIC BLOOD PRESSURE: 140 MMHG | WEIGHT: 217 LBS | DIASTOLIC BLOOD PRESSURE: 51 MMHG | HEIGHT: 64 IN | TEMPERATURE: 98.1 F | HEART RATE: 69 BPM | RESPIRATION RATE: 18 BRPM | OXYGEN SATURATION: 96 % | BODY MASS INDEX: 37.05 KG/M2

## 2024-12-04 LAB
ALBUMIN SERPL-MCNC: 2.3 G/DL (ref 3.2–4.6)
ALBUMIN/GLOB SERPL: 0.9 (ref 1–1.9)
ALP SERPL-CCNC: 73 U/L (ref 35–104)
ALT SERPL-CCNC: 15 U/L (ref 8–45)
ANION GAP SERPL CALC-SCNC: 9 MMOL/L (ref 7–16)
AST SERPL-CCNC: 25 U/L (ref 15–37)
BACTERIA SPEC CULT: ABNORMAL
BACTERIA SPEC CULT: ABNORMAL
BASOPHILS # BLD: 0 K/UL (ref 0–0.2)
BASOPHILS NFR BLD: 1 % (ref 0–2)
BILIRUB SERPL-MCNC: 0.3 MG/DL (ref 0–1.2)
BUN SERPL-MCNC: 15 MG/DL (ref 8–23)
CALCIUM SERPL-MCNC: 8.6 MG/DL (ref 8.8–10.2)
CHLORIDE SERPL-SCNC: 102 MMOL/L (ref 98–107)
CO2 SERPL-SCNC: 27 MMOL/L (ref 20–29)
CREAT SERPL-MCNC: 0.88 MG/DL (ref 0.6–1.1)
DIFFERENTIAL METHOD BLD: ABNORMAL
EOSINOPHIL # BLD: 0.2 K/UL (ref 0–0.8)
EOSINOPHIL NFR BLD: 3 % (ref 0.5–7.8)
ERYTHROCYTE [DISTWIDTH] IN BLOOD BY AUTOMATED COUNT: 14.8 % (ref 11.9–14.6)
GLOBULIN SER CALC-MCNC: 2.6 G/DL (ref 2.3–3.5)
GLUCOSE SERPL-MCNC: 108 MG/DL (ref 70–99)
HCT VFR BLD AUTO: 30.6 % (ref 35.8–46.3)
HGB BLD-MCNC: 9.5 G/DL (ref 11.7–15.4)
IMM GRANULOCYTES # BLD AUTO: 0 K/UL (ref 0–0.5)
IMM GRANULOCYTES NFR BLD AUTO: 1 % (ref 0–5)
LYMPHOCYTES # BLD: 1.5 K/UL (ref 0.5–4.6)
LYMPHOCYTES NFR BLD: 27 % (ref 13–44)
MCH RBC QN AUTO: 30.3 PG (ref 26.1–32.9)
MCHC RBC AUTO-ENTMCNC: 31 G/DL (ref 31.4–35)
MCV RBC AUTO: 97.5 FL (ref 82–102)
MONOCYTES # BLD: 0.6 K/UL (ref 0.1–1.3)
MONOCYTES NFR BLD: 11 % (ref 4–12)
NEUTS SEG # BLD: 3.1 K/UL (ref 1.7–8.2)
NEUTS SEG NFR BLD: 57 % (ref 43–78)
NRBC # BLD: 0 K/UL (ref 0–0.2)
PLATELET # BLD AUTO: 197 K/UL (ref 150–450)
PMV BLD AUTO: 9.4 FL (ref 9.4–12.3)
POTASSIUM SERPL-SCNC: 4.2 MMOL/L (ref 3.5–5.1)
PROT SERPL-MCNC: 4.9 G/DL (ref 6.3–8.2)
RBC # BLD AUTO: 3.14 M/UL (ref 4.05–5.2)
SARS-COV-2 RDRP RESP QL NAA+PROBE: NOT DETECTED
SERVICE CMNT-IMP: ABNORMAL
SODIUM SERPL-SCNC: 138 MMOL/L (ref 136–145)
SOURCE: NORMAL
WBC # BLD AUTO: 5.4 K/UL (ref 4.3–11.1)

## 2024-12-04 PROCEDURE — 85025 COMPLETE CBC W/AUTO DIFF WBC: CPT

## 2024-12-04 PROCEDURE — 80053 COMPREHEN METABOLIC PANEL: CPT

## 2024-12-04 PROCEDURE — 36415 COLL VENOUS BLD VENIPUNCTURE: CPT

## 2024-12-04 PROCEDURE — 87635 SARS-COV-2 COVID-19 AMP PRB: CPT

## 2024-12-04 PROCEDURE — 2580000003 HC RX 258: Performed by: FAMILY MEDICINE

## 2024-12-04 PROCEDURE — 6370000000 HC RX 637 (ALT 250 FOR IP): Performed by: FAMILY MEDICINE

## 2024-12-04 PROCEDURE — 97530 THERAPEUTIC ACTIVITIES: CPT

## 2024-12-04 PROCEDURE — 97112 NEUROMUSCULAR REEDUCATION: CPT

## 2024-12-04 PROCEDURE — 6360000002 HC RX W HCPCS: Performed by: FAMILY MEDICINE

## 2024-12-04 PROCEDURE — 97535 SELF CARE MNGMENT TRAINING: CPT

## 2024-12-04 RX ORDER — CIPROFLOXACIN 500 MG/1
500 TABLET, FILM COATED ORAL 2 TIMES DAILY
Qty: 14 TABLET | Refills: 0 | Status: SHIPPED | OUTPATIENT
Start: 2024-12-04 | End: 2024-12-11

## 2024-12-04 RX ADMIN — SODIUM CHLORIDE, PRESERVATIVE FREE 10 ML: 5 INJECTION INTRAVENOUS at 08:32

## 2024-12-04 RX ADMIN — ENOXAPARIN SODIUM 40 MG: 100 INJECTION SUBCUTANEOUS at 08:31

## 2024-12-04 RX ADMIN — ALLOPURINOL 300 MG: 300 TABLET ORAL at 08:30

## 2024-12-04 RX ADMIN — PANTOPRAZOLE SODIUM 40 MG: 40 TABLET, DELAYED RELEASE ORAL at 05:29

## 2024-12-04 RX ADMIN — CIPROFLOXACIN 400 MG: 400 INJECTION, SOLUTION INTRAVENOUS at 08:35

## 2024-12-04 NOTE — PROGRESS NOTES
Hospitalist Progress Note   Admit Date:  2024  6:51 AM   Name:  Fanny Cho   Age:  78 y.o.  Sex:  female  :  1945   MRN:  [id number]   Room:      Presenting/Chief Complaint: No chief complaint on file.     Reason(s) for Admission: Acute metabolic encephalopathy [G93.41]     Hospital Course:   Fanny Cho is a 78 y.o. female with medical history of Arthritis, Back pain, Edema, GERD (gastroesophageal reflux disease), Gout, Hyperlipidemia, Hypertension, IBS (irritable bowel syndrome), Mixed hyperlipidemia, Morbid obesity, PONV (postoperative nausea and vomiting), Prediabetes, Sciatica, Sleep apnea, and Vitamin D deficiency who presents to the ER with report of falling after feeling lightheaded while walking from the bathroom to her bed the night before the admission. She is unsure if she lost consciousness. Per daughter's report the patient had some visual hallucinations. Denies any fevers, chills, nausea, vomiting, dysuria.  In the emergency room she was found to have a urinary tract infection and given her symptoms and her fall at home hospitalist was consulted for admission       Subjective & 24hr Events:   Mrs. Cho is seen today in her room.  Her mentation is much better.  She was able to work with occupational and physical therapy and they are recommending short-term rehab.  She is from [facility] and is an independent living right now.  She would like to return there but is not opposed to rehab if this is needed.  Medically she is improving well.  Urine culture still states gram-negative rods still waiting on full susceptibilities but once that returns that she is very likely can transition to p.o. and then be discharged to rehab.      Assessment & Plan:   Principal Problem:    Acute metabolic encephalopathy,  Fall  -Likely due to her urinary tract infection, currently stable and seems to be at baseline  - Physical and Occupational Therapy have seen her and are 
       Hospitalist Progress Note   Admit Date:  2024  6:51 AM   Name:  Fanny Cho   Age:  78 y.o.  Sex:  female  :  1945   MRN:  [id number]   Room:      Presenting/Chief Complaint: No chief complaint on file.     Reason(s) for Admission: Acute metabolic encephalopathy [G93.41]     Hospital Course:   Fanny Cho is a 78 y.o. female with medical history of Arthritis, Back pain, Edema, GERD (gastroesophageal reflux disease), Gout, Hyperlipidemia, Hypertension, IBS (irritable bowel syndrome), Mixed hyperlipidemia, Morbid obesity, PONV (postoperative nausea and vomiting), Prediabetes, Sciatica, Sleep apnea, and Vitamin D deficiency who presents to the ER with report of falling after feeling lightheaded while walking from the bathroom to her bed the night before the admission. She is unsure if she lost consciousness. Per daughter's report the patient had some visual hallucinations. Denies any fevers, chills, nausea, vomiting, dysuria.  In the emergency room she was found to have a urinary tract infection and given her symptoms and her fall at home hospitalist was consulted for admission.    Patient was started on antibiotics and her mentation started to improve.  Her urine culture was growing E. coli that was pansensitive.  Patient was on ciprofloxacin      Subjective & 24hr Events:   Patient was seen and evaluated at bedside this morning.  Currently doing well.  Denies any fevers, chills, shortness of breath, chest pain, nausea, vomiting.  Feeling more clearheaded.      Assessment & Plan:     Acute metabolic encephalopathy secondary to E. coli UTI  Urine culture growing E. coli.  Mentation much improved and back at baseline as per son.  -Continue ciprofloxacin, EOT  to complete 7-day    Fall  Likely due to her urinary tract infection, currently stable and seems to be at baseline  PT/OT recommending short-term rehab placement given her unsteadiness  -Continue PT OT  -STR on 
       Hospitalist Progress Note   Admit Date:  2024  6:51 AM   Name:  Fanny Cho   Age:  78 y.o.  Sex:  female  :  1945   MRN:  [id number]   Room:      Presenting/Chief Complaint: No chief complaint on file.     Reason(s) for Admission: Acute metabolic encephalopathy [G93.41]     Hospital Course:   Fanny Cho is a 78 y.o. female with medical history of Arthritis, Back pain, Edema, GERD (gastroesophageal reflux disease), Gout, Hyperlipidemia, Hypertension, IBS (irritable bowel syndrome), Mixed hyperlipidemia, Morbid obesity, PONV (postoperative nausea and vomiting), Prediabetes, Sciatica, Sleep apnea, and Vitamin D deficiency who presents to the ER with report of falling after feeling lightheaded while walking from the bathroom to her bed the night before the admission. She is unsure if she lost consciousness. Per daughter's report the patient had some visual hallucinations. Denies any fevers, chills, nausea, vomiting, dysuria.  In the emergency room she was found to have a urinary tract infection and given her symptoms and her fall at home hospitalist was consulted for admission      Subjective & 24hr Events:   Mrs. Cho is seen today with family in the room.  Her mentation is improved and she is now tolerating a diet.  We will stop her IV fluids at this time but continue the IV antibiotics.  We are waiting on further urine and blood cultures.  As she is stable will also order physical and Occupational Therapy.      Assessment & Plan:     Principal Problem:    Acute metabolic encephalopathy  Fall  -Likely due to her urinary tract infection, currently stable and close to her baseline  - Now that she is more stable physical and Occupational Therapy consult      Urinary tract infection  - IV Cipro continued at this time, awaiting urine cultures      Active Problems:    Dyslipidemia  -Home rosuvastatin continue      Essential hypertension  - Continue home lisinopril        
  ACUTE OCCUPATIONAL THERAPY GOALS:   (Developed with and agreed upon by patient and/or caregiver.)  1. Pt will complete LB ADL Mod A with AE as needed.   2. Pt will complete toileting Min A with AE as needed.   3. Pt will complete UB ADL supervision.  4. Pt will tolerate 25 minutes of OT treatment requiring 1-2 breaks as needed.   5. Pt will complete grooming tasks while standing at sink Min A.  6. Pt will complete functional mobility/transfers via LRAD CGA.   7. Pt will tolerate BUE exercises to increase strength for safe, functional transfers and/or functional mobility, and ADL participation.      Timeframe: 7 visits           OCCUPATIONAL THERAPY: Daily Note PM   OT Visit Days: 2   Time In/Out  OT Charge Capture  Rehab Caseload Tracker  OT Orders    Fanny Cho is a 78 y.o. female   PRIMARY DIAGNOSIS: Acute metabolic encephalopathy  Acute metabolic encephalopathy [G93.41]       Inpatient: Payor: [employer] MEDICARE / Plan: [facility] MEDICARE-ADVANTAGE PPO / Product Type: Medicare /     ASSESSMENT:     REHAB RECOMMENDATIONS:   Recommendation to date pending progress:  Setting:  Short-term Rehab    Equipment:    To Be Determined     ASSESSMENT:  Ms. Cho was received supine in bed. Pt required assistance for functional mobility and ADLs today due to general joint stiffness, decreased chronic bilateral shoulder AROM, generalized weakness, posterior lean in standing, BLE pitting swelling, fear of falling, chronic externally rotated BLE. Pt needs verbal cues to take big steps. Pt is very slowly progressing with functional mobility and ADLs. Continue POC.         SUBJECTIVE:     Ms. Cho states, \"I apologize. I'm just scared.\"     Social/Functional Lives With: Alone  Type of Home: Assisted living ([facility])  Home Equipment: Rollator, Wheelchair - Manual, Walker - Rolling  ADL Assistance: Needs assistance  Ambulation Assistance: Independent    OBJECTIVE:     LINES / DRAINS / 
4 Eyes Skin Assessment     NAME:  Fanny Cho  YOB: 1945  MEDICAL RECORD NUMBER:  [id number]    The patient is being assessed for  Admission    I agree that at least one RN has performed a thorough Head to Toe Skin Assessment on the patient. ALL assessment sites listed below have been assessed.      Areas assessed by both nurses:    Head, Face, Ears, Shoulders, Back, Chest, Arms, Elbows, Hands, Sacrum. Buttock, Coccyx, Ischium, and Legs. Feet and Heels        Does the Patient have a Wound? No noted wound(s)    Excoriation noted to bottom.   Area cleaned with cleanser, zinc paste applied, wound prevention dressing placed.          Festus Prevention initiated by RN: Yes  Wound Care Orders initiated by RN: No    Pressure Injury (Stage 3,4, Unstageable, DTI, NWPT, and Complex wounds) if present, place Wound referral order by RN under : No    New Ostomies, if present place, Ostomy referral order under : No     Nurse 1 eSignature: Electronically signed by Tobias Muir RN on 12/1/24 at 11:07 AM EST    **SHARE this note so that the co-signing nurse can place an eSignature**    Nurse 2 eSignature: Electronically signed by Kristal Vásquez RN on 12/2/24 at 5:45 AM EST   
ACUTE PHYSICAL THERAPY GOALS:   (Developed with and agreed upon by patient and/or caregiver.)  (1.) Fanny Cho  will move from supine to sit and sit to supine , scoot up and down, and roll side to side with CONTACT GUARD ASSIST within 7 treatment day(s).    (2.) Fanny Cho will transfer from bed to chair and chair to bed with CONTACT GUARD ASSIST using the least restrictive device within 7 treatment day(s).    (3.) Fanny Cho will ambulate with CONTACT GUARD ASSIST for 100 feet with the least restrictive device within 7 treatment day(s).   (4.) Fanny Cho will perform standing static and dynamic balance activities x 15 minutes with CONTACT GUARD ASSIST to improve safety within 7 treatment day(s).  (5.) Fanny Cho will perform therapeutic exercises x 20 min for HEP with INDEPENDENCE to improve strength, endurance, and functional mobility within 7 treatment day(s).     PHYSICAL THERAPY: Daily Note PM   (Link to Caseload Tracking: PT Visit Days : 2  Time In/Out PT Charge Capture  Rehab Caseload Tracker  Orders    Fanny Cho is a 78 y.o. female   PRIMARY DIAGNOSIS: Acute metabolic encephalopathy  Acute metabolic encephalopathy [G93.41]     Inpatient: Payor: [facility] MEDICARE / Plan: [facility] MEDICARE-ADVANTAGE PPO / Product Type: Medicare /     ASSESSMENT:     REHAB RECOMMENDATIONS:   Recommendation to date pending progress:  Setting:  Short-term Rehab    Equipment:    To Be Determined     ASSESSMENT:    Ms. Cho presents resting in bed, just had a visitor and overall in good spirits, but generally nervous about moving and getting out of bed. This date pt performs mobility including bed mobility, sitting balance activities, sit <> stand  transfers, stand pivot transfers/steps bed > bedside chair, bedside chair <> commode with Mod Ax2, intermittent Max A for initial standing balance as pt with tendency to lean posteriorly resulting in unsteadiness. Also with decreased 
END OF SHIFT NOTE:    INTAKE/OUTPUT  No intake/output data recorded.  Voiding: Yes  Catheter: No  purwick  that patient has not used.    Drain:              Flatus: Patient does have flatus present.    Stool: 1 occurrences.    Characteristics:  Stool Appearance: Formed  Stool Color: Brown  Stool Amount: Small  Stool Assessment  Stool Appearance: Formed  Stool Color: Brown  Stool Amount: Small  Stool Source: Rectum    Emesis: 0 occurrences.    Characteristics:        VITAL SIGNS  Patient Vitals for the past 12 hrs:   Temp Pulse Resp BP SpO2   12/01/24 1200 98.2 °F (36.8 °C) 78 20 (!) 111/56 98 %   12/01/24 0800 98.4 °F (36.9 °C) 75 18 (!) 105/58 98 %       Pain Assessment  None             Ambulating  Yes   Moved to the bedside commode.       Shift report given to oncoming nurse at the bedside.    Tobias Muir RN      
IP Consult to Vascular Access Team  Consult performed by: Jose Hodge RN  Consult ordered by: Yan Patel MD       US Guided PIV access-    Ultrasound was used to find the vein which was compressible and without any ultrasound features of an artery or nerve bundle. Skin was cleaned and disinfected prior to IV puncture.  Under real-time ultrasound guidance peripheral access was obtained in the right forearm using 22 G 2.5\" Peripheral IV catheter after 1 attempt(s). Blood return was present and IV flushed without difficulty with no clinical signs of infiltration. IV dressing applied and no immediate complications noted. Patient tolerated the procedure well.       
Nutrition Assessment  Assessment Type: Initial, Positive nutrition screen  Reason for visit:  Malnutrition Screening Tool: Malnutrition Screen  Have you recently lost weight without trying?: 34 or more pounds (4 points) (PCP aware; 60-70 lbs)  Have you been eating poorly because of a decreased appetite?: Yes (1 point)  Malnutrition Screening Tool Score: 5  Malnutrition Screening Tool Score: 5    Nutrition Intervention:   Food and/or Nutrient Delivery:   Meals and Snacks:  Diet: Downgrade easy to chew per pt preference  Medical Food Supplements:   Medical food supplement therapy:  Change Ensure Enlive (high calorie high protein) 350 calories, 20 grams protein per 8 ounce serving      Malnutrition Assessment:  Malnutrition Status: At risk for malnutrition    Nutrition Focused Physical Exam: Only remarkable for mild triceps wasting  Nutrition Assessment:  Food/Nutrition Related History: Pt reports for the past few months her appetite has been decreased. She stated she was busy taking care of other people, she stated she would forget to eat. Pt denies any ONS use. She stated her daughter suggested trying ONS however she never tried any.      Do You Have Any Cultural, Sabianist, or Ethnic Food Preferences?: No   Weight History: Pt reports ~70lb wt loss in the past 6 months. She stated she went from ~280lb to ~220lb. Per EMR wt hx review of internal medicine office visits 1/24 236lb, 8/19 225lb, 10/23 210lb.   Nutrition Background:       PMH significant for GERD, gout, HLD, HTN, IBS, and prediabetes. Pt admitted with acute metabolic encephalopathy likely due to UTI.   Nutrition Monitoring/Evaluation:  Pt seen sitting in recliner with nursing student present. Pt reports hx as above. She stated she is doing well with the meals here. Pt reports she tried Ensure clear however she did not really care for it. She stated she could drink it if she has to. We discussed a trial of Ensure. Pt stated she would like to taste Ensure 
Spiritual Health History and Assessment/Progress Note  [facility]    (P) Initial Encounter,  ,  ,      Name: Fanny Cho MRN: [id number]    Age: 78 y.o.     Sex: female   Language: English   Episcopalian: Restorationism   Acute metabolic encephalopathy     Date: 12/2/2024            Total Time Calculated: (P) 20 min              Spiritual Assessment began in [facility] SURGICAL        Referral/Consult From: (P) Rounding   Encounter Overview/Reason: (P) Initial Encounter  Service Provided For: (P) Patient    Esther, Belief, Meaning:   Patient identifies as spiritual, is connected with a esther tradition or spiritual practice, and has beliefs or practices that help with coping during difficult times  Family/Friends No family/friends present      Importance and Influence:  Patient has no beliefs influential to healthcare decision-making identified during this visit  Family/Friends No family/friends present    Community:  Patient is connected with a spiritual community  Family/Friends No family/friends present    Assessment and Plan of Care:     Patient Interventions include: Facilitated expression of thoughts and feelings, Explored spiritual coping/struggle/distress, Affirmed coping skills/support systems, and Provided sacramental/Adventist ritual  Family/Friends Interventions include: No family/friends present    Patient Plan of Care: Spiritual Care available upon further referral  Family/Friends Plan of Care: Spiritual Care available upon further referral    Electronically signed by TIM FELIX on 12/2/2024 at 10:44 AM    
TRANSFER - IN REPORT:    Verbal report received from FOUZIA Mora on Fanny Cho  being received from [facility] for routine progression of patient care      Report consisted of patient's Situation, Background, Assessment and   Recommendations(SBAR).     Information from the following report(s) Nurse Handoff Report was reviewed with the receiving nurse.    Opportunity for questions and clarification was provided.      Assessment completed upon patient's arrival to unit and care assumed.     
TRANSFER - OUT REPORT:    Verbal report given to Heidi on Fanny Cho  being transferred to [facility] for routine progression of patient care       Report consisted of patient's Situation, Background, Assessment and   Recommendations(SBAR).     Information from the following report(s) Nurse Handoff Report was reviewed with the receiving nurse.           Lines:   Peripheral IV 12/03/24 Right;Anterior Forearm (Active)   Site Assessment Clean, dry & intact 12/04/24 0836   Line Status Capped;Flushed 12/04/24 0836   Line Care Ports disinfected 12/04/24 0836   Phlebitis Assessment No symptoms 12/04/24 0836   Infiltration Assessment 0 12/04/24 0836   Alcohol Cap Used Yes 12/04/24 0836   Dressing Status Clean, dry & intact 12/04/24 0836   Dressing Type Transparent 12/04/24 0836   Dressing Intervention New 12/03/24 2041        Opportunity for questions and clarification was provided.      Patient transported with:  Tech       
    LINES / DRAINS / AIRWAY: External Catheter and IV    RESTRICTIONS/PRECAUTIONS:  Restrictions/Precautions  Restrictions/Precautions: Fall Risk, Bed Alarm        PAIN: VITALS / O2:   Pre Treatment:            Post Treatment: none Vitals          Oxygen        MOBILITY: I Mod I S SBA CGA Min Mod Max Total  NT x2 Comments:   Bed Mobility    Rolling [] [] [] [] [] [x] [x] [] [] [] []    Supine to Sit [] [] [] [] [] [x] [x] [] [] [] [x]    Scooting [] [] [] [] [] [] [x] [] [] [] [x]    Sit to Supine [] [] [] [] [] [] [] [] [] [] []    Transfers    Sit to Stand [] [] [] [] [] [x] [] [] [] [] [x]    Bed to Chair [] [] [] [] [] [x] [] [] [] [] [x]    Stand to Sit [] [] [] [] [] [x] [x] [] [] [] [x]    Tub/Shower [] [] [] [] [] [] [] [] [] [] []     Toilet [] [] [] [] [] [] [] [] [] [] []      [] [] [] [] [] [] [] [] [] [] []    I=Independent, Mod I=Modified Independent, S=Supervision/Setup, SBA=Standby Assistance, CGA=Contact Guard Assistance, Min=Minimal Assistance, Mod=Moderate Assistance, Max=Maximal Assistance, Total=Total Assistance, NT=Not Tested    ACTIVITIES OF DAILY LIVING: I Mod I S SBA CGA Min Mod Max Total NT Comments   BASIC ADLs:              Upper Body   Bathing [] [] [] [] [] [] [] [] [] []     Lower Body Bathing [] [] [] [] [] [] [] [] [] []     Toileting [] [] [] [] [] [] [] [] [] []    Upper Body Dressing [] [] [] [] [] [x] [] [] [] []    Lower Body Dressing [] [] [] [] [] [] [] [] [] []    Feeding [] [] [] [] [] [] [] [] [] []    Grooming [] [] [] [x] [] [] [] [] [] []    Personal Device Care [] [] [] [] [] [] [] [] [] []    Functional Mobility [] [] [] [] [] [x] [x] [] [] [] Bed to chair and sit to stand's    I=Independent, Mod I=Modified Independent, S=Supervision/Setup, SBA=Standby Assistance, CGA=Contact Guard Assistance, Min=Minimal Assistance, Mod=Moderate Assistance, Max=Maximal Assistance, Total=Total Assistance, NT=Not Tested    BALANCE: Good Fair+ Fair Fair- Poor NT Comments   Sitting Static 
  Orientation [x]     Vision [x]     Hearing [x]     Cognition  [x]     Perception []       MOBILITY: I Mod I S SBA CGA Min Mod Max Total  NT x2 Comments:   Bed Mobility    Rolling [] [] [] [] [] [] [] [] [] [] []    Supine to Sit [] [] [] [] [] [] [x] [] [] [] [x]    Scooting [] [] [] [] [] [] [x] [] [] [] [x]    Sit to Supine [] [] [] [] [] [] [] [] [] [] []    Transfers    Sit to Stand [] [] [] [] [] [x] [x] [] [] [] [x]    Bed to Chair [] [] [] [] [] [x] [x] [] [] [] [x]    Stand to Sit [] [] [] [] [] [x] [x] [] [] [] [x]    Tub/Shower [] [] [] [] [] [] [] [] [] [] []     Toilet [] [] [] [] [] [] [] [] [] [] []      [] [] [] [] [] [] [] [] [] [] []    I=Independent, Mod I=Modified Independent, S=Supervision/Setup, SBA=Standby Assistance, CGA=Contact Guard Assistance, Min=Minimal Assistance, Mod=Moderate Assistance, Max=Maximal Assistance, Total=Total Assistance, NT=Not Tested    ACTIVITIES OF DAILY LIVING: I Mod I S SBA CGA Min Mod Max Total NT Comments   BASIC ADLs:              Upper Body Bathing  [] [] [] [] [] [] [] [] [] []     Lower Body Bathing [] [] [] [] [] [] [] [] [] []     Toileting [] [] [] [] [] [] [] [x] [] [] X2; A for pericare in standing at rolling walker, dynamic standing balance, donning new brief   Upper Body Dressing [] [] [] [] [] [] [] [] [] []    Lower Body Dressing [] [] [] [] [] [] [] [] [] []    Feeding [] [] [] [] [] [] [] [] [] []    Grooming [] [] [] [x] [] [] [] [] [] [] Supported sitting combing bilateral hands due to limited shoulder AROM (long handled hair brush recommended), washing face, applying deoderant   Personal Device Care [] [] [] [] [] [] [] [] [] []    Functional Mobility [] [] [] [] [] [x] [x] [] [] [] X2 bed > chair > bedside commode > chair via RW   I=Independent, Mod I=Modified Independent, S=Supervision/Setup, SBA=Standby Assistance, CGA=Contact Guard Assistance, Min=Minimal Assistance, Mod=Moderate Assistance, Max=Maximal Assistance, Total=Total Assistance, 
XUAN    EDUCATION:      TIME IN/OUT:  Time In: 0523  Time Out: 1009  Minutes: 29    Cecelia Arredondo, PT

## 2024-12-04 NOTE — DISCHARGE SUMMARY
Metabolic Panel w/ Reflex to MG    Collection Time: 12/04/24  4:40 AM   Result Value Ref Range    Sodium 138 136 - 145 mmol/L    Potassium 4.2 3.5 - 5.1 mmol/L    Chloride 102 98 - 107 mmol/L    CO2 27 20 - 29 mmol/L    Anion Gap 9 7 - 16 mmol/L    Glucose 108 (H) 70 - 99 mg/dL    BUN 15 8 - 23 MG/DL    Creatinine 0.88 0.60 - 1.10 MG/DL    Est, Glom Filt Rate 68 >60 ml/min/1.73m2    Calcium 8.6 (L) 8.8 - 10.2 MG/DL    Total Bilirubin 0.3 0.0 - 1.2 MG/DL    ALT 15 8 - 45 U/L    AST 25 15 - 37 U/L    Alk Phosphatase 73 35 - 104 U/L    Total Protein 4.9 (L) 6.3 - 8.2 g/dL    Albumin 2.3 (L) 3.2 - 4.6 g/dL    Globulin 2.6 2.3 - 3.5 g/dL    Albumin/Globulin Ratio 0.9 (L) 1.0 - 1.9     CBC with Auto Differential    Collection Time: 12/04/24  4:40 AM   Result Value Ref Range    WBC 5.4 4.3 - 11.1 K/uL    RBC 3.14 (L) 4.05 - 5.2 M/uL    Hemoglobin 9.5 (L) 11.7 - 15.4 g/dL    Hematocrit 30.6 (L) 35.8 - 46.3 %    MCV 97.5 82 - 102 FL    MCH 30.3 26.1 - 32.9 PG    MCHC 31.0 (L) 31.4 - 35.0 g/dL    RDW 14.8 (H) 11.9 - 14.6 %    Platelets 197 150 - 450 K/uL    MPV 9.4 9.4 - 12.3 FL    nRBC 0.00 0.0 - 0.2 K/uL    Differential Type AUTOMATED      Neutrophils % 57 43 - 78 %    Lymphocytes % 27 13 - 44 %    Monocytes % 11 4.0 - 12.0 %    Eosinophils % 3 0.5 - 7.8 %    Basophils % 1 0.0 - 2.0 %    Immature Granulocytes % 1 0.0 - 5.0 %    Neutrophils Absolute 3.1 1.7 - 8.2 K/UL    Lymphocytes Absolute 1.5 0.5 - 4.6 K/UL    Monocytes Absolute 0.6 0.1 - 1.3 K/UL    Eosinophils Absolute 0.2 0.0 - 0.8 K/UL    Basophils Absolute 0.0 0.0 - 0.2 K/UL    Immature Granulocytes Absolute 0.0 0.0 - 0.5 K/UL   COVID-19, Rapid    Collection Time: 12/04/24 10:29 AM    Specimen: Nasopharyngeal   Result Value Ref Range    Source NASAL      SARS-CoV-2, Rapid Not detected NOTD         Recent Labs     12/04/24  1029   COVID19 Not detected       Recent Vital Data:  Patient Vitals for the past 24 hrs:   Temp Pulse Resp BP SpO2   12/04/24 1118 98.1 °F (36.7

## 2024-12-04 NOTE — DISCHARGE INSTRUCTIONS
Learning About Metabolic Encephalopathy  What is metabolic encephalopathy?  Metabolic encephalopathy is a problem in the brain. It is caused by a chemical imbalance in the blood. The imbalance is caused by an illness or organs that are not working as well as they should. It is not caused by a head injury. When the imbalance affects the brain, it can lead to personality changes. It can also make it harder to think clearly and remember things.  The problems may only last a short time if you get treatment right away. But this depends on the cause. If the imbalance has been building up because you've been sick for a long time, the mental changes may be more severe. They may also last longer.  What happens when you have this problem?  When things are working right, your body has many ways to keep the chemicals in your blood in balance. For example, your liver and kidneys remove waste from your blood. The kidneys also help keep fluids and sodium in balance. And your pancreas makes insulin. It is a hormone that helps control the amount of sugar in your blood.  But the chemicals in your blood can get out of balance and damage parts of your body because of a medical problem. This may be kidney or liver failure. Or it could be diabetes that isn't controlled well. When the imbalance affects the brain, normal thinking and behavior can change.  What are the symptoms?  Symptoms may include:  Confusion.  Problems with thinking and remembering.  Being grouchy and depressed.  Feeling drowsy.  Not being able to sleep.  Passing out (fainting) now and then.  How is it treated?  The doctor will try to find the illness that's causing the problem. The doctor may ask questions about your past health.  The doctor will also do tests to find what is causing the chemical imbalance and to see how severe it is.  The doctor may treat the organ system that's causing the problem. For example, if it's a kidney problem, you may have treatment to

## 2024-12-04 NOTE — CARE COORDINATION
Patient with discharge orders for today. Patient discharging to  [facility]. [facility] to provide transport. Pick-up time: 1400. Report line information given to primary RN. Patient has met all treatment goals and milestones for discharge. Patient/family in agreement with discharge plan. CM following until patient is discharged.      12/04/24 1217   Services At/After Discharge   Transition of Care Consult (CM Consult) Discharge Planning   Services At/After Discharge Skilled Nursing Facility (SNF);Transport    Resource Information Provided? No   Mode of Transport at Discharge BLS   Confirm Follow Up Transport Family   Condition of Participation: Discharge Planning   The Plan for Transition of Care is related to the following treatment goals: Return to baseline   The Patient and/or Patient Representative was provided with a Choice of Provider? Patient   The Patient and/Or Patient Representative agree with the Discharge Plan? Yes   Freedom of Choice list was provided with basic dialogue that supports the patient's individualized plan of care/goals, treatment preferences, and shares the quality data associated with the providers?  Yes

## 2024-12-05 ENCOUNTER — CARE COORDINATION (OUTPATIENT)
Dept: CARE COORDINATION | Facility: CLINIC | Age: 79
End: 2024-12-05

## 2024-12-05 NOTE — CARE COORDINATION
Transition of care outreach postponed for 7 days due to patient's discharge to [facility] on [facility].

## 2024-12-06 LAB
BACTERIA SPEC CULT: NORMAL
SERVICE CMNT-IMP: NORMAL

## 2024-12-12 ENCOUNTER — CARE COORDINATION (OUTPATIENT)
Dept: CARE COORDINATION | Facility: CLINIC | Age: 79
End: 2024-12-12

## 2024-12-12 NOTE — CARE COORDINATION
Transition of care outreach postponed for 7 days due to patient's discharge to and continued stay at [facility] on [facility].   Spoke with Armand Braswell Director of Nursing Skilled Nursing, discharge plan is to return to [facility], 20th day length of stay for STR will be on 12/23/2024, anticipated transfer to [facility] at that time.  Will continue to follow and document per BRENDA workflow guidelines and recommendations.

## 2024-12-19 ENCOUNTER — CARE COORDINATION (OUTPATIENT)
Dept: CARE COORDINATION | Facility: CLINIC | Age: 79
End: 2024-12-19

## 2024-12-19 NOTE — CARE COORDINATION
Transition of care outreach postponed for 4 days due to patient's discharge to and continued stay at [facility]. Anticipated discharge on 12/20/2024 and plan is to return to [facility] at [facility].  Follow up call scheduled on 12/23/2024.

## 2024-12-23 ENCOUNTER — CARE COORDINATION (OUTPATIENT)
Dept: CARE COORDINATION | Facility: CLINIC | Age: 79
End: 2024-12-23

## 2024-12-23 NOTE — CARE COORDINATION
Care Transitions Note    Initial Call - Call within 2 business days of discharge: Yes    Attempted to reach patient for transitions of care follow up. Unable to reach patient.    Outreach Attempts:   HIPAA compliant voicemail left for patient.     Patient: Fanny Cho    Patient : 1945   MRN: [id number]    Reason for Admission: Syncope/fall.  Discharge Date: 24  RURS: Readmission Risk Score: 17.1    Last Discharge Facility       Date Complaint Diagnosis Description Type Department Provider    24  Syncope, unspecified syncope type Admission (Discharged) Aissatou Dela Cruz MD    24 Hip Pain Fall, initial encounter ... ED (TRANSFER) Pito Fair MD            Was this an external facility discharge? Yes. Discharge Date: 2024. [facility]    Follow Up Appointment:   Patient has hospital follow up appointment scheduled greater than 14 days after discharge; No follow up scheduled with PCP.    Future Appointments         Provider Specialty Dept Phone    2025 1:00 PM Fabi Vargas, APRN - CNP Orthopedic Surgery [phone]    2025 1:30 PM Thor Alonzo PA Internal Medicine [phone]    2025 1:30 PM Thor Alonzo PA Internal Medicine [phone]            Plan for follow-up call in 2-5 days     Esha Verma LPN

## 2024-12-26 ENCOUNTER — CARE COORDINATION (OUTPATIENT)
Dept: CARE COORDINATION | Facility: CLINIC | Age: 79
End: 2024-12-26

## 2024-12-26 NOTE — CARE COORDINATION
Patient Current Location:  Home: [address] [place] [address] [place] [address]    Care Transition Nurse contacted the patient by telephone to perform post hospital discharge assessment, verified name and  as identifiers. Provided introduction to self, and explanation of the Care Transition Nurse role.     Patient: Fanny Cho    Patient : 1945   MRN: [id number]    Reason for Admission: Syncope, unspecified syncope type  Discharge Date: 24  RURS: Readmission Risk Score: 17.1      Last Discharge Facility       Date Complaint Diagnosis Description Type Department Provider    24  Syncope, unspecified syncope type Admission (Discharged) [id number] Pérez MD    24 Hip Pain Fall, initial encounter ... ED (TRANSFER) Pito Fair MD            Was this an external facility discharge? Yes; [facility]    Additional needs identified to be addressed with provider   No needs identified             Method of communication with provider: none.    Patients top risk factors for readmission: medical condition-Arthritis, Back pain, Edema, GERD (gastroesophageal reflux disease), Gout, Hyperlipidemia, Hypertension, IBS (irritable bowel syndrome), Mixed hyperlipidemia, Morbid obesity, PONV (postoperative nausea and vomiting), Prediabetes, Sciatica, Sleep apnea, and Vitamin D deficiency    Interventions to address risk factors:   Review of patient management of conditions/medications: verbalized understanding.  Home Health: states she will have home health therapy twice a week for now. Unsure of home health name.     Care Summary Note: Patient states that she lives alone in an independent living facility. She states that she will follow up with the provider at the facility. Follow up scheduled with surgeon.    Care Transition Nurse reviewed discharge instructions with patient. The patient was given an opportunity to ask questions; all questions answered at this time.. The patient verbalized

## 2024-12-30 ENCOUNTER — CARE COORDINATION (OUTPATIENT)
Dept: CARE COORDINATION | Facility: CLINIC | Age: 79
End: 2024-12-30
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
## 2024-12-31 PROBLEM — N39.0 UTI (URINARY TRACT INFECTION): Status: RESOLVED | Noted: 2024-12-01 | Resolved: 2024-12-31

## 2025-01-03 ENCOUNTER — CARE COORDINATION (OUTPATIENT)
Dept: CARE COORDINATION | Facility: CLINIC | Age: 80
End: 2025-01-03

## 2025-01-03 NOTE — CARE COORDINATION
Care Transitions Note    Final Call     Patient Current Location:  Home: [address] [place] [address] [place] [address]    LPN Care Coordinator contacted the patient by telephone. Verified name and  as identifiers.    Patient graduated from the Care Transitions program on 2025.  Patient/family verbalizes confidence in the ability to self-manage at this time..      Advance Care Planning:   Does patient have an Advance Directive: reviewed during previous call, see note. .    Handoff:   Patient was not referred to the ACM team due to no additional needs identified.       Care Summary Note: States she has a cold with a nagging cough, is afebrile, denies shortness of breath, otherwise she feels fine and glad to be home.  Reports good appetite and adequate hydration.  Denies falls. No new needs or concerns identified.  Has contact information if needed, Esha Verma LPN  [phone].    Assessments:  States she has a cold and cough. Afebrile, denies complaints of shortness of breath.     Upcoming Appointments:    Future Appointments         Provider Specialty Dept Phone    2025 1:00 PM Fabi Vargas, APRN - CNP Orthopedic Surgery [phone]    2025 1:30 PM Thor Alonzo PA Internal Medicine [phone]    2025 1:30 PM Thor Alonzo PA Internal Medicine [phone]            Patient has agreed to contact primary care provider and/or specialist for any further questions, concerns, or needs.    Esha Verma LPN

## 2025-02-23 ENCOUNTER — APPOINTMENT (OUTPATIENT)
Dept: GENERAL RADIOLOGY | Age: 80
End: 2025-02-23
Payer: MEDICARE

## 2025-02-23 ENCOUNTER — HOSPITAL ENCOUNTER (EMERGENCY)
Age: 80
Discharge: NURSING, REHAB OR LONG-TERM CARE | End: 2025-02-24
Attending: EMERGENCY MEDICINE
Payer: MEDICARE

## 2025-02-23 DIAGNOSIS — M25.561 ACUTE PAIN OF RIGHT KNEE: Primary | ICD-10-CM

## 2025-02-23 DIAGNOSIS — M25.551 RIGHT HIP PAIN: ICD-10-CM

## 2025-02-23 PROCEDURE — 73502 X-RAY EXAM HIP UNI 2-3 VIEWS: CPT

## 2025-02-23 PROCEDURE — 6370000000 HC RX 637 (ALT 250 FOR IP): Performed by: EMERGENCY MEDICINE

## 2025-02-23 PROCEDURE — 99283 EMERGENCY DEPT VISIT LOW MDM: CPT

## 2025-02-23 PROCEDURE — 73562 X-RAY EXAM OF KNEE 3: CPT

## 2025-02-23 RX ORDER — HYDROCODONE BITARTRATE AND ACETAMINOPHEN 7.5; 325 MG/1; MG/1
1 TABLET ORAL
Status: COMPLETED | OUTPATIENT
Start: 2025-02-23 | End: 2025-02-23

## 2025-02-23 RX ADMIN — HYDROCODONE BITARTRATE AND ACETAMINOPHEN 1 TABLET: 7.5; 325 TABLET ORAL at 23:34

## 2025-02-23 ASSESSMENT — PAIN DESCRIPTION - LOCATION: LOCATION: KNEE;BACK

## 2025-02-23 ASSESSMENT — PAIN DESCRIPTION - PAIN TYPE: TYPE: ACUTE PAIN

## 2025-02-23 ASSESSMENT — PAIN - FUNCTIONAL ASSESSMENT: PAIN_FUNCTIONAL_ASSESSMENT: 0-10

## 2025-02-23 ASSESSMENT — PAIN SCALES - GENERAL: PAINLEVEL_OUTOF10: 2

## 2025-02-23 ASSESSMENT — PAIN DESCRIPTION - ORIENTATION: ORIENTATION: RIGHT

## 2025-02-24 VITALS
DIASTOLIC BLOOD PRESSURE: 60 MMHG | OXYGEN SATURATION: 96 % | RESPIRATION RATE: 18 BRPM | HEART RATE: 77 BPM | SYSTOLIC BLOOD PRESSURE: 119 MMHG | BODY MASS INDEX: 38.58 KG/M2 | HEIGHT: 64 IN | TEMPERATURE: 98 F | WEIGHT: 226 LBS

## 2025-02-24 RX ORDER — HYDROCODONE BITARTRATE AND ACETAMINOPHEN 10; 325 MG/1; MG/1
1 TABLET ORAL EVERY 6 HOURS PRN
Qty: 15 TABLET | Refills: 0 | Status: SHIPPED | OUTPATIENT
Start: 2025-02-24 | End: 2025-03-01

## 2025-02-24 NOTE — ED TRIAGE NOTES
Pt presents via EMS from [facility] on [address] after a fall while walking from bathroom to room.  Pt co right knee pain and mid back pain.  Pt has hx of chronic back pain but states that it is slightly worse.  Pt did not hit head, is not on blood thinners.  Pt states she hit her face and has pain to right lower cheek.      Pt ambulates with walker while in her room at [facility] and uses WC when she leaves her room.

## 2025-02-24 NOTE — ED PROVIDER NOTES
Take 1 tablet by mouth 2 times daily as needed for Pain    ALBUTEROL SULFATE HFA (VENTOLIN HFA) 108 (90 BASE) MCG/ACT INHALER    Inhale 2 puffs into the lungs 4 times daily as needed for Wheezing    ALLOPURINOL (ZYLOPRIM) 300 MG TABLET    Take 1 tablet by mouth daily    BISACODYL (DULCOLAX) 5 MG EC TABLET    Take 1 tablet by mouth daily as needed for Constipation    LOPERAMIDE (IMODIUM) 2 MG CAPSULE    Take 1 capsule by mouth 4 times daily as needed for Diarrhea    MICONAZOLE (MICOTIN) 2 % CREAM    Apply topically 2 times daily as needed for skin irritation.    MISC NATURAL PRODUCTS (JOINT HEALTH) CAPS    Take 125 mg by mouth daily    NONFORMULARY    2% Miconazole Nitrate external cream  Use as needed on groin    OMEGA-3 FATTY ACIDS (FISH OIL) 1000 MG CAPSULE    Take 1 capsule by mouth daily    OMEPRAZOLE (PRILOSEC) 20 MG DELAYED RELEASE CAPSULE    Take 1 capsule by mouth Daily    ROSUVASTATIN (CRESTOR) 5 MG TABLET    TAKE ONE TABLET BY MOUTH EVERY EVENING    VITAMIN D (CHOLECALCIFEROL) 125 MCG (5000 UT) CAPS CAPSULE    Take 1 capsule by mouth daily        Results from this emergency department visit:      No results found for any visits on 02/23/25.      XR KNEE RIGHT (3 VIEWS)    (Results Pending)   XR HIP RIGHT (2-3 VIEWS)    (Results Pending)                No results for input(s): \"COVID19\" in the last 72 hours.     Voice dictation software was used during the making of this note.  This software is not perfect and grammatical and other typographical errors may be present.  This note has not been completely proofread for errors.     Jose Coffey III, MD  02/24/25 0019       Jose Coffey III, MD  02/24/25 0024

## 2025-04-24 ENCOUNTER — APPOINTMENT (OUTPATIENT)
Dept: GENERAL RADIOLOGY | Age: 80
DRG: 593 | End: 2025-04-24
Payer: MEDICARE

## 2025-04-24 ENCOUNTER — HOSPITAL ENCOUNTER (INPATIENT)
Age: 80
LOS: 6 days | Discharge: NURSING, REHAB OR LONG-TERM CARE | DRG: 593 | End: 2025-05-02
Attending: EMERGENCY MEDICINE | Admitting: HOSPITALIST
Payer: MEDICARE

## 2025-04-24 DIAGNOSIS — R22.43 LOCALIZED SWELLING OF BOTH LOWER LEGS: ICD-10-CM

## 2025-04-24 DIAGNOSIS — L03.115 BILATERAL LOWER LEG CELLULITIS: Primary | ICD-10-CM

## 2025-04-24 DIAGNOSIS — L89.609 PRESSURE INJURY OF SKIN OF HEEL, UNSPECIFIED INJURY STAGE, UNSPECIFIED LATERALITY: ICD-10-CM

## 2025-04-24 DIAGNOSIS — L03.116 BILATERAL LOWER LEG CELLULITIS: Primary | ICD-10-CM

## 2025-04-24 PROBLEM — E88.09 HYPOALBUMINEMIA: Status: ACTIVE | Noted: 2025-04-24

## 2025-04-24 LAB
ANION GAP SERPL CALC-SCNC: 14 MMOL/L (ref 7–16)
APPEARANCE UR: CLEAR
BACTERIA URNS QL MICRO: NEGATIVE /HPF
BILIRUB UR QL: NEGATIVE
BUN SERPL-MCNC: 18 MG/DL (ref 8–23)
CALCIUM SERPL-MCNC: 9.4 MG/DL (ref 8.8–10.2)
CASTS URNS QL MICRO: 0 /LPF
CHLORIDE SERPL-SCNC: 103 MMOL/L (ref 98–107)
CO2 SERPL-SCNC: 23 MMOL/L (ref 20–29)
COLOR UR: ABNORMAL
CREAT SERPL-MCNC: 0.74 MG/DL (ref 0.6–1.1)
CRYSTALS URNS QL MICRO: 0 /LPF
EPI CELLS #/AREA URNS HPF: ABNORMAL /HPF
ERYTHROCYTE [DISTWIDTH] IN BLOOD BY AUTOMATED COUNT: 17.1 % (ref 11.9–14.6)
GLUCOSE SERPL-MCNC: 98 MG/DL (ref 70–99)
GLUCOSE UR STRIP.AUTO-MCNC: NEGATIVE MG/DL
HCT VFR BLD AUTO: 37 % (ref 35.8–46.3)
HGB BLD-MCNC: 11.9 G/DL (ref 11.7–15.4)
HGB UR QL STRIP: NEGATIVE
HYALINE CASTS URNS QL MICRO: ABNORMAL /LPF
KETONES UR QL STRIP.AUTO: NEGATIVE MG/DL
LEUKOCYTE ESTERASE UR QL STRIP.AUTO: NEGATIVE
MCH RBC QN AUTO: 28.2 PG (ref 26.1–32.9)
MCHC RBC AUTO-ENTMCNC: 32.2 G/DL (ref 31.4–35)
MCV RBC AUTO: 87.7 FL (ref 82–102)
MUCOUS THREADS URNS QL MICRO: 0 /LPF
NITRITE UR QL STRIP.AUTO: NEGATIVE
NRBC # BLD: 0 K/UL (ref 0–0.2)
PH UR STRIP: 6.5 (ref 5–9)
PLATELET # BLD AUTO: 241 K/UL (ref 150–450)
PMV BLD AUTO: 9.4 FL (ref 9.4–12.3)
POTASSIUM SERPL-SCNC: 4 MMOL/L (ref 3.5–5.1)
PROT UR STRIP-MCNC: NEGATIVE MG/DL
RBC # BLD AUTO: 4.22 M/UL (ref 4.05–5.2)
RBC #/AREA URNS HPF: ABNORMAL /HPF
SODIUM SERPL-SCNC: 140 MMOL/L (ref 136–145)
SP GR UR REFRACTOMETRY: 1.01 (ref 1–1.02)
URINE CULTURE IF INDICATED: ABNORMAL
UROBILINOGEN UR QL STRIP.AUTO: 0.2 EU/DL (ref 0.2–1)
WBC # BLD AUTO: 7.2 K/UL (ref 4.3–11.1)
WBC URNS QL MICRO: ABNORMAL /HPF

## 2025-04-24 PROCEDURE — 6360000002 HC RX W HCPCS: Performed by: EMERGENCY MEDICINE

## 2025-04-24 PROCEDURE — 6360000002 HC RX W HCPCS: Performed by: HOSPITALIST

## 2025-04-24 PROCEDURE — G0378 HOSPITAL OBSERVATION PER HR: HCPCS

## 2025-04-24 PROCEDURE — 87070 CULTURE OTHR SPECIMN AEROBIC: CPT

## 2025-04-24 PROCEDURE — 2580000003 HC RX 258: Performed by: EMERGENCY MEDICINE

## 2025-04-24 PROCEDURE — 2580000003 HC RX 258: Performed by: HOSPITALIST

## 2025-04-24 PROCEDURE — 87040 BLOOD CULTURE FOR BACTERIA: CPT

## 2025-04-24 PROCEDURE — 96368 THER/DIAG CONCURRENT INF: CPT

## 2025-04-24 PROCEDURE — 96365 THER/PROPH/DIAG IV INF INIT: CPT

## 2025-04-24 PROCEDURE — 96366 THER/PROPH/DIAG IV INF ADDON: CPT

## 2025-04-24 PROCEDURE — 81001 URINALYSIS AUTO W/SCOPE: CPT

## 2025-04-24 PROCEDURE — 73630 X-RAY EXAM OF FOOT: CPT

## 2025-04-24 PROCEDURE — 96372 THER/PROPH/DIAG INJ SC/IM: CPT

## 2025-04-24 PROCEDURE — 99285 EMERGENCY DEPT VISIT HI MDM: CPT

## 2025-04-24 PROCEDURE — 6370000000 HC RX 637 (ALT 250 FOR IP): Performed by: HOSPITALIST

## 2025-04-24 PROCEDURE — 85027 COMPLETE CBC AUTOMATED: CPT

## 2025-04-24 PROCEDURE — 87205 SMEAR GRAM STAIN: CPT

## 2025-04-24 PROCEDURE — 80048 BASIC METABOLIC PNL TOTAL CA: CPT

## 2025-04-24 PROCEDURE — 96375 TX/PRO/DX INJ NEW DRUG ADDON: CPT

## 2025-04-24 PROCEDURE — 87077 CULTURE AEROBIC IDENTIFY: CPT

## 2025-04-24 PROCEDURE — 87186 SC STD MICRODIL/AGAR DIL: CPT

## 2025-04-24 PROCEDURE — 36415 COLL VENOUS BLD VENIPUNCTURE: CPT

## 2025-04-24 PROCEDURE — 2500000003 HC RX 250 WO HCPCS: Performed by: HOSPITALIST

## 2025-04-24 RX ORDER — ENOXAPARIN SODIUM 100 MG/ML
30 INJECTION SUBCUTANEOUS 2 TIMES DAILY
Status: DISCONTINUED | OUTPATIENT
Start: 2025-04-24 | End: 2025-05-02 | Stop reason: HOSPADM

## 2025-04-24 RX ORDER — POLYETHYLENE GLYCOL 3350 17 G/17G
17 POWDER, FOR SOLUTION ORAL DAILY PRN
Status: DISCONTINUED | OUTPATIENT
Start: 2025-04-24 | End: 2025-05-02 | Stop reason: HOSPADM

## 2025-04-24 RX ORDER — ACETAMINOPHEN 325 MG/1
650 TABLET ORAL EVERY 6 HOURS PRN
Status: DISCONTINUED | OUTPATIENT
Start: 2025-04-24 | End: 2025-05-02 | Stop reason: HOSPADM

## 2025-04-24 RX ORDER — SODIUM CHLORIDE, SODIUM LACTATE, POTASSIUM CHLORIDE, CALCIUM CHLORIDE 600; 310; 30; 20 MG/100ML; MG/100ML; MG/100ML; MG/100ML
INJECTION, SOLUTION INTRAVENOUS CONTINUOUS
Status: DISCONTINUED | OUTPATIENT
Start: 2025-04-24 | End: 2025-04-25

## 2025-04-24 RX ORDER — SODIUM CHLORIDE 0.9 % (FLUSH) 0.9 %
5-40 SYRINGE (ML) INJECTION PRN
Status: DISCONTINUED | OUTPATIENT
Start: 2025-04-24 | End: 2025-05-02 | Stop reason: HOSPADM

## 2025-04-24 RX ORDER — MAGNESIUM SULFATE IN WATER 40 MG/ML
2000 INJECTION, SOLUTION INTRAVENOUS PRN
Status: DISCONTINUED | OUTPATIENT
Start: 2025-04-24 | End: 2025-05-02 | Stop reason: HOSPADM

## 2025-04-24 RX ORDER — SENNA AND DOCUSATE SODIUM 50; 8.6 MG/1; MG/1
1 TABLET, FILM COATED ORAL 2 TIMES DAILY
Status: DISCONTINUED | OUTPATIENT
Start: 2025-04-24 | End: 2025-05-02 | Stop reason: HOSPADM

## 2025-04-24 RX ORDER — POTASSIUM CHLORIDE 1500 MG/1
40 TABLET, EXTENDED RELEASE ORAL PRN
Status: DISCONTINUED | OUTPATIENT
Start: 2025-04-24 | End: 2025-05-02 | Stop reason: HOSPADM

## 2025-04-24 RX ORDER — PROMETHAZINE HYDROCHLORIDE 25 MG/1
25 TABLET ORAL EVERY 6 HOURS PRN
Status: DISCONTINUED | OUTPATIENT
Start: 2025-04-24 | End: 2025-05-02 | Stop reason: HOSPADM

## 2025-04-24 RX ORDER — SODIUM CHLORIDE 0.9 % (FLUSH) 0.9 %
5-40 SYRINGE (ML) INJECTION EVERY 12 HOURS SCHEDULED
Status: DISCONTINUED | OUTPATIENT
Start: 2025-04-24 | End: 2025-05-02 | Stop reason: HOSPADM

## 2025-04-24 RX ORDER — ROSUVASTATIN CALCIUM 5 MG/1
5 TABLET, COATED ORAL NIGHTLY
Status: DISCONTINUED | OUTPATIENT
Start: 2025-04-24 | End: 2025-05-02 | Stop reason: HOSPADM

## 2025-04-24 RX ORDER — ONDANSETRON 2 MG/ML
4 INJECTION INTRAMUSCULAR; INTRAVENOUS EVERY 4 HOURS PRN
Status: DISCONTINUED | OUTPATIENT
Start: 2025-04-24 | End: 2025-05-02 | Stop reason: HOSPADM

## 2025-04-24 RX ORDER — ACETAMINOPHEN 650 MG/1
650 SUPPOSITORY RECTAL EVERY 6 HOURS PRN
Status: DISCONTINUED | OUTPATIENT
Start: 2025-04-24 | End: 2025-05-02 | Stop reason: HOSPADM

## 2025-04-24 RX ORDER — SODIUM CHLORIDE 9 MG/ML
INJECTION, SOLUTION INTRAVENOUS PRN
Status: DISCONTINUED | OUTPATIENT
Start: 2025-04-24 | End: 2025-05-02 | Stop reason: HOSPADM

## 2025-04-24 RX ORDER — LOPERAMIDE HYDROCHLORIDE 2 MG/1
2 CAPSULE ORAL 4 TIMES DAILY PRN
Status: DISCONTINUED | OUTPATIENT
Start: 2025-04-24 | End: 2025-05-02 | Stop reason: HOSPADM

## 2025-04-24 RX ORDER — LINEZOLID 2 MG/ML
600 INJECTION, SOLUTION INTRAVENOUS EVERY 12 HOURS
Status: DISCONTINUED | OUTPATIENT
Start: 2025-04-25 | End: 2025-04-28

## 2025-04-24 RX ORDER — POTASSIUM CHLORIDE 7.45 MG/ML
10 INJECTION INTRAVENOUS PRN
Status: DISCONTINUED | OUTPATIENT
Start: 2025-04-24 | End: 2025-05-02 | Stop reason: HOSPADM

## 2025-04-24 RX ORDER — LACTOBACILLUS RHAMNOSUS GG 10B CELL
1 CAPSULE ORAL 2 TIMES DAILY WITH MEALS
Status: DISCONTINUED | OUTPATIENT
Start: 2025-04-24 | End: 2025-05-02 | Stop reason: HOSPADM

## 2025-04-24 RX ORDER — ALLOPURINOL 300 MG/1
300 TABLET ORAL DAILY
Status: DISCONTINUED | OUTPATIENT
Start: 2025-04-24 | End: 2025-05-02 | Stop reason: HOSPADM

## 2025-04-24 RX ADMIN — PIPERACILLIN AND TAZOBACTAM 4500 MG: 4; .5 INJECTION, POWDER, FOR SOLUTION INTRAVENOUS at 18:08

## 2025-04-24 RX ADMIN — Medication 2500 MG: at 18:52

## 2025-04-24 RX ADMIN — ACETAMINOPHEN 650 MG: 325 TABLET ORAL at 21:52

## 2025-04-24 RX ADMIN — ENOXAPARIN SODIUM 30 MG: 100 INJECTION SUBCUTANEOUS at 21:51

## 2025-04-24 RX ADMIN — SODIUM CHLORIDE, SODIUM LACTATE, POTASSIUM CHLORIDE, AND CALCIUM CHLORIDE: .6; .31; .03; .02 INJECTION, SOLUTION INTRAVENOUS at 20:29

## 2025-04-24 RX ADMIN — ROSUVASTATIN CALCIUM 5 MG: 5 TABLET, FILM COATED ORAL at 21:52

## 2025-04-24 RX ADMIN — SODIUM CHLORIDE, PRESERVATIVE FREE 10 ML: 5 INJECTION INTRAVENOUS at 20:42

## 2025-04-24 RX ADMIN — Medication 1 CAPSULE: at 18:00

## 2025-04-24 RX ADMIN — ALLOPURINOL 300 MG: 100 TABLET ORAL at 18:00

## 2025-04-24 RX ADMIN — FAMOTIDINE 20 MG: 10 INJECTION, SOLUTION INTRAVENOUS at 18:09

## 2025-04-24 RX ADMIN — SODIUM CHLORIDE, SODIUM LACTATE, POTASSIUM CHLORIDE, AND CALCIUM CHLORIDE: .6; .31; .03; .02 INJECTION, SOLUTION INTRAVENOUS at 18:49

## 2025-04-24 ASSESSMENT — PAIN SCALES - GENERAL
PAINLEVEL_OUTOF10: 3
PAINLEVEL_OUTOF10: 0

## 2025-04-24 ASSESSMENT — PAIN DESCRIPTION - DESCRIPTORS: DESCRIPTORS: ACHING;SHOOTING

## 2025-04-24 ASSESSMENT — PAIN - FUNCTIONAL ASSESSMENT
PAIN_FUNCTIONAL_ASSESSMENT: 0-10
PAIN_FUNCTIONAL_ASSESSMENT: ACTIVITIES ARE NOT PREVENTED
PAIN_FUNCTIONAL_ASSESSMENT: NONE - DENIES PAIN

## 2025-04-24 ASSESSMENT — PAIN DESCRIPTION - ORIENTATION: ORIENTATION: MID;LOWER

## 2025-04-24 ASSESSMENT — PAIN DESCRIPTION - LOCATION: LOCATION: BACK

## 2025-04-24 NOTE — H&P
Hospitalist History and Physical   Admit Date:  2025  1:27 PM   Name:  Fanny Cho   Age:  79 y.o.  Sex:  female  :  1945   MRN:  [id number]   Room:  ER/    Presenting/Chief Complaint: Skin Problem and Altered Mental Status     Reason(s) for Admission: Pressure sore on heel [L89.609]     History of Present Illness:   Fanny Cho is a 79 y.o. female with medical history of obesity BMI 38.7, HTN, dyslipidemia, gout, debility, venous stasis of B/L LE brought in from [facility] in view of bilateral foot pain and heel ulcers.  Pt is a resident of [facility] at [facility], pt's daughter concerned about pt being confused and noted to be in discomfort complaining of  pain bilateral heels.  Hx is mostly obtained from pt's daughter, ER physician and chart review. Pt has been feeling lethargic and weak for past few days and reports of having pain in heels. Pt noted to have bilateral heel wounds.  Pt also endorses some dysuria but not reporting of frequency or urgency.  No reported fever, chills, night sweats, chest/abdominal pain, nausea/vomiting, diarrhea, chest pain, headache, lightheadedness or dizziness.  VS on arrival: Tm 97.7, RR 16, DC 88, /80 and room air sats of 99%.  Bilateral feet x-ray with advanced degenerative changes in both feet, flexion deformities at DIP of multiple left toes, soft tissue swelling seen predominantly on the left. Blood work remarkable for Alb 2.3.    Assessment & Plan:     Principal Problem:    Pressure sore on heel    Venous stasis dermatitis of both lower extremities  Plan:   Admit to obs for possible cellulitis and wound care eval.  F/u with blood cx  Starting on empiric Abx to cover GNR and GPC.  Pain control with prn narcotics.    Active Problems:      Gout  Plan: resumed allopurinol.      Mixed hyperlipidemia  Plan: resumed crestor.      Obesity, morbid (HCC)  Plan: BMI 38.7, increases overall medical complexity and morbidity.      Essential  hypertension  Plan: BP optimally controlled.  Not on any antihypertensives.      Hypoalbuminemia  Plan: ordered for protein supplementation with each meal.      F/u with urinalysis.        PT/OT evals ordered?  Therapy evals ordered  Diet: ADULT DIET; Regular; No Added Salt (3-4 gm)  ADULT ORAL NUTRITION SUPPLEMENT; Breakfast, Lunch, Dinner; Low Calorie/High Protein Oral Supplement  VTE prophylaxis: Lovenox  Code status: Full Code  Code status discussed: Yes  Blood consent obtained: No      Non-peripheral Lines and Tubes (if present):             Hospital Problems:  Principal Problem:    Pressure sore on heel  Active Problems:    Venous stasis dermatitis of both lower extremities    Gout    Mixed hyperlipidemia    Obesity, morbid (HCC)    Essential hypertension    Hypoalbuminemia  Resolved Problems:    * No resolved hospital problems. *        Objective:   Patient Vitals for the past 24 hrs:   Temp Pulse Resp BP SpO2   04/24/25 1248 97.7 °F (36.5 °C) 88 16 136/80 99 %       Oxygen Therapy  SpO2: 99 %    Estimated body mass index is 38.79 kg/m² as calculated from the following:    Height as of this encounter: 1.626 m (5' 4\").    Weight as of this encounter: 102.5 kg (226 lb).  No intake or output data in the 24 hours ending 04/24/25 1653      Physical Exam:    General:    Elderly, obese, lethargic, on RA, NAD    Head:  Normocephalic, atraumatic  Eyes:  Sclerae appear normal.  Pupils equally round.  ENT:  Nares appear normal.  Moist oral mucosa  Neck:  No restricted ROM.  Trachea midline   CV:   RRR.  No m/r/g.  No jugular venous distension.  Lungs:   CTAB.  No wheezing, rhonchi, or rales.  Symmetric expansion.  Abdomen:   Soft, nontender, nondistended.  Extremities: Bilateral heel ulcers/pressure sores with surrounding erythema, warm and tender to touch. Venous stasis noted in B.L LE  Skin:     No rashes.  Normal coloration.   Warm and dry.    Neuro:  CN II-XII grossly intact.  Sensation intact.   Psych:  Normal mood

## 2025-04-24 NOTE — ED TRIAGE NOTES
Patient arrives via [facility] from the [facility] living with CO sores to BLE. Family reports confusion. Pt a/o. Reports dysuria x 2 days.

## 2025-04-25 ENCOUNTER — APPOINTMENT (OUTPATIENT)
Dept: ULTRASOUND IMAGING | Age: 80
DRG: 593 | End: 2025-04-25
Payer: MEDICARE

## 2025-04-25 LAB
ANION GAP SERPL CALC-SCNC: 9 MMOL/L (ref 7–16)
BASOPHILS # BLD: 0.08 K/UL (ref 0–0.2)
BASOPHILS NFR BLD: 1.2 % (ref 0–2)
BUN SERPL-MCNC: 16 MG/DL (ref 8–23)
CALCIUM SERPL-MCNC: 8.5 MG/DL (ref 8.8–10.2)
CHLORIDE SERPL-SCNC: 107 MMOL/L (ref 98–107)
CO2 SERPL-SCNC: 22 MMOL/L (ref 20–29)
CREAT SERPL-MCNC: 0.72 MG/DL (ref 0.6–1.1)
DIFFERENTIAL METHOD BLD: ABNORMAL
EOSINOPHIL # BLD: 0.48 K/UL (ref 0–0.8)
EOSINOPHIL NFR BLD: 7.5 % (ref 0.5–7.8)
ERYTHROCYTE [DISTWIDTH] IN BLOOD BY AUTOMATED COUNT: 17.1 % (ref 11.9–14.6)
GLUCOSE SERPL-MCNC: 89 MG/DL (ref 70–99)
HCT VFR BLD AUTO: 31.4 % (ref 35.8–46.3)
HGB BLD-MCNC: 9.7 G/DL (ref 11.7–15.4)
IMM GRANULOCYTES # BLD AUTO: 0.02 K/UL (ref 0–0.5)
IMM GRANULOCYTES NFR BLD AUTO: 0.3 % (ref 0–5)
LYMPHOCYTES # BLD: 1.71 K/UL (ref 0.5–4.6)
LYMPHOCYTES NFR BLD: 26.7 % (ref 13–44)
MCH RBC QN AUTO: 27.9 PG (ref 26.1–32.9)
MCHC RBC AUTO-ENTMCNC: 30.9 G/DL (ref 31.4–35)
MCV RBC AUTO: 90.2 FL (ref 82–102)
MONOCYTES # BLD: 0.61 K/UL (ref 0.1–1.3)
MONOCYTES NFR BLD: 9.5 % (ref 4–12)
NEUTS SEG # BLD: 3.51 K/UL (ref 1.7–8.2)
NEUTS SEG NFR BLD: 54.8 % (ref 43–78)
NRBC # BLD: 0 K/UL (ref 0–0.2)
PLATELET # BLD AUTO: 189 K/UL (ref 150–450)
PMV BLD AUTO: 9.6 FL (ref 9.4–12.3)
POTASSIUM SERPL-SCNC: 3.8 MMOL/L (ref 3.5–5.1)
RBC # BLD AUTO: 3.48 M/UL (ref 4.05–5.2)
SODIUM SERPL-SCNC: 138 MMOL/L (ref 136–145)
WBC # BLD AUTO: 6.4 K/UL (ref 4.3–11.1)

## 2025-04-25 PROCEDURE — 93970 EXTREMITY STUDY: CPT

## 2025-04-25 PROCEDURE — 6360000002 HC RX W HCPCS: Performed by: HOSPITALIST

## 2025-04-25 PROCEDURE — G0378 HOSPITAL OBSERVATION PER HR: HCPCS

## 2025-04-25 PROCEDURE — 85025 COMPLETE CBC W/AUTO DIFF WBC: CPT

## 2025-04-25 PROCEDURE — 97162 PT EVAL MOD COMPLEX 30 MIN: CPT

## 2025-04-25 PROCEDURE — 2500000003 HC RX 250 WO HCPCS: Performed by: HOSPITALIST

## 2025-04-25 PROCEDURE — 96366 THER/PROPH/DIAG IV INF ADDON: CPT

## 2025-04-25 PROCEDURE — 93970 EXTREMITY STUDY: CPT | Performed by: RADIOLOGY

## 2025-04-25 PROCEDURE — 6370000000 HC RX 637 (ALT 250 FOR IP): Performed by: HOSPITALIST

## 2025-04-25 PROCEDURE — 97530 THERAPEUTIC ACTIVITIES: CPT

## 2025-04-25 PROCEDURE — 97535 SELF CARE MNGMENT TRAINING: CPT

## 2025-04-25 PROCEDURE — 96367 TX/PROPH/DG ADDL SEQ IV INF: CPT

## 2025-04-25 PROCEDURE — 96372 THER/PROPH/DIAG INJ SC/IM: CPT

## 2025-04-25 PROCEDURE — 96368 THER/DIAG CONCURRENT INF: CPT

## 2025-04-25 PROCEDURE — 80048 BASIC METABOLIC PNL TOTAL CA: CPT

## 2025-04-25 PROCEDURE — 36415 COLL VENOUS BLD VENIPUNCTURE: CPT

## 2025-04-25 PROCEDURE — 2580000003 HC RX 258: Performed by: HOSPITALIST

## 2025-04-25 PROCEDURE — 97166 OT EVAL MOD COMPLEX 45 MIN: CPT

## 2025-04-25 PROCEDURE — 96376 TX/PRO/DX INJ SAME DRUG ADON: CPT

## 2025-04-25 PROCEDURE — 6370000000 HC RX 637 (ALT 250 FOR IP): Performed by: INTERNAL MEDICINE

## 2025-04-25 RX ORDER — TRAMADOL HYDROCHLORIDE 50 MG/1
50 TABLET ORAL EVERY 6 HOURS PRN
Status: DISCONTINUED | OUTPATIENT
Start: 2025-04-25 | End: 2025-05-02 | Stop reason: HOSPADM

## 2025-04-25 RX ADMIN — ENOXAPARIN SODIUM 30 MG: 100 INJECTION SUBCUTANEOUS at 08:15

## 2025-04-25 RX ADMIN — ENOXAPARIN SODIUM 30 MG: 100 INJECTION SUBCUTANEOUS at 20:24

## 2025-04-25 RX ADMIN — Medication 1 CAPSULE: at 08:15

## 2025-04-25 RX ADMIN — Medication 1 CAPSULE: at 16:45

## 2025-04-25 RX ADMIN — PIPERACILLIN AND TAZOBACTAM 3375 MG: 3; .375 INJECTION, POWDER, LYOPHILIZED, FOR SOLUTION INTRAVENOUS at 17:28

## 2025-04-25 RX ADMIN — FAMOTIDINE 20 MG: 10 INJECTION, SOLUTION INTRAVENOUS at 16:47

## 2025-04-25 RX ADMIN — LINEZOLID 600 MG: 600 INJECTION, SOLUTION INTRAVENOUS at 20:27

## 2025-04-25 RX ADMIN — PIPERACILLIN AND TAZOBACTAM 3375 MG: 3; .375 INJECTION, POWDER, LYOPHILIZED, FOR SOLUTION INTRAVENOUS at 02:34

## 2025-04-25 RX ADMIN — LINEZOLID 600 MG: 600 INJECTION, SOLUTION INTRAVENOUS at 08:23

## 2025-04-25 RX ADMIN — ROSUVASTATIN CALCIUM 5 MG: 5 TABLET, FILM COATED ORAL at 20:24

## 2025-04-25 RX ADMIN — Medication 3 MG: at 20:24

## 2025-04-25 RX ADMIN — FAMOTIDINE 20 MG: 10 INJECTION, SOLUTION INTRAVENOUS at 05:15

## 2025-04-25 RX ADMIN — ACETAMINOPHEN 650 MG: 325 TABLET ORAL at 15:33

## 2025-04-25 RX ADMIN — ALLOPURINOL 300 MG: 100 TABLET ORAL at 08:15

## 2025-04-25 RX ADMIN — CHOLECALCIFEROL TAB 125 MCG (5000 UNIT) 5000 UNITS: 125 TAB at 08:15

## 2025-04-25 RX ADMIN — SODIUM CHLORIDE, PRESERVATIVE FREE 10 ML: 5 INJECTION INTRAVENOUS at 20:24

## 2025-04-25 RX ADMIN — SODIUM CHLORIDE, PRESERVATIVE FREE 10 ML: 5 INJECTION INTRAVENOUS at 08:14

## 2025-04-25 RX ADMIN — TRAMADOL HYDROCHLORIDE 50 MG: 50 TABLET, COATED ORAL at 17:15

## 2025-04-25 RX ADMIN — PIPERACILLIN AND TAZOBACTAM 3375 MG: 3; .375 INJECTION, POWDER, LYOPHILIZED, FOR SOLUTION INTRAVENOUS at 13:39

## 2025-04-25 ASSESSMENT — PAIN DESCRIPTION - LOCATION
LOCATION: HEAD
LOCATION: HEAD;LEG
LOCATION: BACK;HIP

## 2025-04-25 ASSESSMENT — PAIN DESCRIPTION - ORIENTATION
ORIENTATION: ANTERIOR
ORIENTATION: LOWER;RIGHT
ORIENTATION: LEFT;MID

## 2025-04-25 ASSESSMENT — PAIN DESCRIPTION - DESCRIPTORS
DESCRIPTORS: ACHING
DESCRIPTORS: ACHING;DISCOMFORT

## 2025-04-25 ASSESSMENT — PAIN SCALES - GENERAL
PAINLEVEL_OUTOF10: 0
PAINLEVEL_OUTOF10: 4
PAINLEVEL_OUTOF10: 3
PAINLEVEL_OUTOF10: 6
PAINLEVEL_OUTOF10: 1
PAINLEVEL_OUTOF10: 3
PAINLEVEL_OUTOF10: 3

## 2025-04-25 ASSESSMENT — PAIN - FUNCTIONAL ASSESSMENT
PAIN_FUNCTIONAL_ASSESSMENT: PREVENTS OR INTERFERES SOME ACTIVE ACTIVITIES AND ADLS
PAIN_FUNCTIONAL_ASSESSMENT: PREVENTS OR INTERFERES SOME ACTIVE ACTIVITIES AND ADLS

## 2025-04-25 NOTE — PROGRESS NOTES
Nutrition Assessment  Assessment Type: Initial, Positive nutrition screen  Reason for visit:  Best Practice Alert: Malnutrition Screening Tool   Malnutrition Screening Tool Score: 3    Nutrition Intervention:   Food and/or Nutrient Delivery:   Meals and Snacks:  Diet: Continue current order  Medical Food Supplements:   Medical food supplement therapy:  Change Ensure High Protein (low calorie high protein) 160 calories, 16 grams protein per 8 ounce serving     Malnutrition Assessment:  Academy/A.S.P.E.N Clinical Malnutrition Criteria  Malnutrition Status: Insufficient data (awaiting measured wt in setting of historical wt loss, po intake has improved per report)  Nutrition Focused Physical Exam: Deferred due to pt getting up to bedside commode    Nutrition Assessment:  Food/Nutrition Related History:   Resident at [facility] since Nov 2024 per dtr at bedside, uses a manual wheelchair to go to dining room.  Pt reports eating better at [facility] and using ensure than prior to going to [facility].       Do You Have Any Cultural, Zoroastrian, or Ethnic Food Preferences?: No   Weight History:   All inpt wt hx stated, current wt estimated.  PT notes she lost wt prior but htinks she has been doing well recently.    IM office 10/23/24: 209#, 225# 8/19/24, 240# 2/2/24, 255# /22/23.  Historical wt loss per IM records over 1 year 18%.    Dtr reports 70# wt loss prior to going to [facility].  PCTs in room at RD encoutner, asked to reste bed and weigh once she returns to bed.    Nutrition Background:       PMH obesity, HTN, DLP, gout, debility, venous stasis yenny LE.  Presented form [facility] with yenny foot pain and heel ulcers, daughter concerned about confusion.      Admitted with pressure sore on hell, venous stasis dermatitis of both lower extremities, gout, hypoalbunemia.     Nutrition Monitoring/Evaluation:  Pt up to recliner, alert and oriented.  Dtr at bedside.  Awaiting arrival of PCT at RD arrival to assist with bedside commode.   Pt reports

## 2025-04-25 NOTE — PROGRESS NOTES
4 Eyes Skin Assessment     NAME:  Fanny Cho  YOB: 1945  MEDICAL RECORD NUMBER:  [id number]    The patient is being assessed for  Admission    I agree that at least one RN has performed a thorough Head to Toe Skin Assessment on the patient. ALL assessment sites listed below have been assessed.      Areas assessed by both nurses:    Head, Face, Ears, Shoulders, Back, Chest, Arms, Elbows, Hands, Sacrum. Buttock, Coccyx, Ischium, and Legs. Feet and Heels        Does the Patient have a Wound? Yes wound(s) were present on assessment. LDA wound assessment was Initiated and completed by RN       Festus Prevention initiated by RN: Yes  Wound Care Orders initiated by RN: Yes    Pressure Injury (Stage 3,4, Unstageable, DTI, NWPT, and Complex wounds) if present, place Wound referral order by RN under : No    New Ostomies, if present place, Ostomy referral order under : No     Nurse 1 eSignature: Electronically signed by Roberta Munson RN on 4/25/25 at 3:08 AM EDT    **SHARE this note so that the co-signing nurse can place an eSignature**    Nurse 2 eSignature: Electronically signed by NICHOLE GARVEY RN on 4/25/25 at 3:13 AM EDT

## 2025-04-25 NOTE — CARE COORDINATION
CM met with Ms. Cho in room 608 to discuss discharge planning.  She is observation status (MOON letter signed) for bilateral LE venous stasis dermatitis.      Prior to coming to [place], she said that she has been living at [facility] since August 2023, and that for 2 weeks prior to that, had been in their skilled unit for short-term rehab.  She said that she uses a rollator in her WILLY room, and a manual WC to get to their dining room.  She says that she can do her own transfers from her bed to her WC, and back again.      At discharge, she said that she would like to return to her retirement; however, she said that because of the bilateral foot ulcers, she might need to go the [facility] skilled unit for rehab, before returning to her retirement room.  Await therapy evaluations and further medical workup.  CM will follow and assist with discharge planning.     Addendum at 13:11:  Per therapy recommendations, referral sent to [employer] at [facility] for STR.  Sent via Epic, await their review.       04/25/25 0901   Service Assessment   Patient Orientation Alert and Oriented   Cognition Alert   History Provided By Patient   Primary Caregiver Self   Support Systems Children   PCP Verified by CM Yes   Prior Functional Level Assistance with the following:;Cooking;Housework;Shopping;Mobility;Other (see comment)  (uses rollator in retirement room, manual WC to go to dining area)   Current Functional Level Other (see comment)  (TBD)   Can patient return to prior living arrangement Unknown at present   Ability to make needs known: Fair   Family able to assist with home care needs: Yes   Would you like for me to discuss the discharge plan with any other family members/significant others, and if so, who? No   Condition of Participation: Discharge Planning   The Plan for Transition of Care is related to the following treatment goals: she would like to return to [facility], but says that she might have to go their skilled nurrsing area first for

## 2025-04-25 NOTE — PROGRESS NOTES
ACUTE PHYSICAL THERAPY GOALS:   (Developed with and agreed upon by patient and/or caregiver.)  LTG:  (1.)Ms. Cho will move from supine to sit and sit to supine , scoot up and down, and roll side to side in bed with CONTACT GUARD ASSIST within 7 treatment day(s).    (2.)Ms. Cho will transfer from bed to chair and chair to bed with CONTACT GUARD ASSIST using the least restrictive device within 7 treatment day(s).    (3.)Ms. Cho will ambulate with CONTACT GUARD ASSIST for 25 feet with the least restrictive device within 7 treatment day(s).  ________________________________________________________________________________________________      PHYSICAL THERAPY Initial Assessment and Daily Note  (Link to Caseload Tracking: PT Visit Days : 1  Acknowledge Orders  Time In/Out  PT Charge Capture  Rehab Caseload Tracker    Fanny Cho is a 79 y.o. female   PRIMARY DIAGNOSIS: Pressure sore on heel  Pressure sore on heel [L89.609]  Bilateral lower leg cellulitis [L03.116, L03.115]  Pressure injury of skin of heel, unspecified injury stage, unspecified laterality [L89.609]       Reason for Referral: Other abnormalities of gait and mobility (R26.89)  Repeated Falls (R29.6)  Observation: Payor: [place] MEDICARE / Plan: PEDRO LUISTRASHID MEDICARE-ADVANTAGE PPO / Product Type: Medicare /     ASSESSMENT:     REHAB RECOMMENDATIONS:   Recommendation to date pending progress:  Setting:  Short-term Rehab    Equipment:    To Be Determined     ASSESSMENT:  Ms. Cho was admitted due to above.  She presents standing with OTR, was given min Ax2 to transfer to [facility] with RW.  Patient then ambulated few feet to recliner with RW and min Ax2.. She was very unsteady and ambulated with small, shuffling steps.   Patient has experienced a decline in functional mobility recently and she is at high risk of falls.  She is very pleasant and cooperative.   Based on objective measures and findings of assessment, skilled PT is medically and  intervention is medically necessary to address:)  Decreased ADL/Functional Activities  Decreased Activity Tolerance  Decreased Balance  Decreased Gait Ability  Decreased Transfer Abilities  Increased Pain INTERVENTIONS PLANNED:   (Benefits and precautions of physical therapy have been discussed with the patient.)  Self Care Training  Therapeutic Activity  Therapeutic Exercise/HEP  Gait Training  Education       TREATMENT:   EVALUATION: MODERATE COMPLEXITY: (Untimed Charge)  The initial evaluation charge encompasses clinical chart review, objective assessment, interpretation of assessment, and skilled monitoring of the patient's response to treatment in order to develop a plan of care.     TREATMENT:   Co-Treatment PT/OT necessary due to patient's decreased overall endurance/tolerance levels, as well as need for high level skilled assistance to complete functional transfers/mobility and functional tasks  Therapeutic Activity (23 Minutes): Therapeutic activity included Transfer Training, Ambulation on level ground, and Standing balance to improve functional Activity tolerance, Balance, Mobility, and Strength.    TREATMENT GRID:       AFTER TREATMENT PRECAUTIONS: Alarm Activated, Bed/Chair Locked, Call light within reach, Chair, Needs within reach, and RN notified    INTERDISCIPLINARY COLLABORATION:  RN/ PCT, PT/ PTA, and OT/ GOVEA    EDUCATION:    Educated patient and/or family/caregiver on the following: Fall prevention strategies    TIME IN/OUT:  Time In: 0925  Time Out: 0950  Minutes: 25    LAN Raymundo, PT

## 2025-04-25 NOTE — WOUND CARE
Consulted for Bilat heels. Pt currently going CRYSTAL to US. Will reassess at a later interval if time allows.

## 2025-04-25 NOTE — PROGRESS NOTES
ACUTE OCCUPATIONAL THERAPY GOALS:   (Developed with and agreed upon by patient and/or caregiver.)  1. Pt will toilet with min A using AE as needed   2. Pt will complete functional mobility for ADLs with CGA using AD as needed  3. Pt will complete lower body dressing with min A using AE as needed  4. Pt will complete grooming and hygiene at sink with CGA  5. Pt will demonstrate independence with HEP to promote increased BUE strength and functional use for ADLs  6. Pt will tolerate 23 minutes functional activity with 1-2 rest breaks to promote increased endurance for ADLs      Timeframe: 7 visits      OCCUPATIONAL THERAPY Initial Assessment and Daily Note       OT Visit Days: 1  Acknowledge Orders  Time  OT Charge Capture  Rehab Caseload Tracker      Fanny Cho is a 79 y.o. female   PRIMARY DIAGNOSIS: Pressure sore on heel  Pressure sore on heel [L89.609]  Bilateral lower leg cellulitis [L03.116, L03.115]  Pressure injury of skin of heel, unspecified injury stage, unspecified laterality [L89.609]       Reason for Referral: Generalized Muscle Weakness (M62.81)  Repeated Falls (R29.6)  History of falling (Z91.81)  Observation: Payor: [place] MEDICARE / Plan: AETRASHID MEDICARE-ADVANTAGE PPO / Product Type: Medicare /     ASSESSMENT:     REHAB RECOMMENDATIONS:   Recommendation to date pending progress:  Setting:  Short-term Rehab    Equipment:    To Be Determined     ASSESSMENT:  Ms. Cho was admitted with BLE cellulitis/ heel ulcers, AMS. Pt resides at [facility], is normally independent with ADLs (minus socks and shoes), uses a rollator in her room and WC out. Pt does endorse several recent falls. Pt presented with deficits in mobility, balance, activity tolerance, and overall strength impacting ADLs. Pt required min-mod A for bed and functional mobility using rolling walker, max A for toileting and LB dressing. Pt unsteady and required several attempts to stand and to obtain balance. Fatigued quickly and

## 2025-04-25 NOTE — PROGRESS NOTES
Hospitalist Progress Note   Admit Date:  2025  1:27 PM   Name:  Fanny Cho   Age:  79 y.o.  Sex:  female  :  1945   MRN:  [id number]   Room:  [facility]/    Presenting/Chief Complaint: Skin Problem and Altered Mental Status     Reason(s) for Admission: Pressure sore on heel [L89.609]  Bilateral lower leg cellulitis [L03.116, L03.115]  Pressure injury of skin of heel, unspecified injury stage, unspecified laterality [L89.609]     Hospital Course:   Fanny Cho is a 79 y.o. female with medical history of  obesity BMI 38.7, HTN, dyslipidemia, gout, debility, venous stasis of B/L LE brought in from [facility] in view of bilateral foot pain and heel ulcers.   Patient lives in assisted living at the [facility] and was brought in by the daughter with concern patient for confusion as well as discomfort in her bilateral heels.      On presentation, patient was lethargic and information was obtained from the daughter.  For the past several days, patient has been having weakness, lethargy as well as heel pain with worsening drainage from the heel.  Also complains of dysuria without any urinary frequency or urgency.    In the emergency room, patient was hemodynamically stable and saturating well on room air.  Bilateral x-ray of her feet show advanced degenerative changes in both feet, flexion deformities in the DIP of multiple left toes, soft tissue swelling predominantly on the left.  Laboratory workup was fairly unremarkable except for albumin of 2.3.     Subjective & 24hr Events:   Patient was seen and examined at bedside.  Sitting up in her recliner reading a book.  Reports that she has been having worsening lower extremity swelling with worsening drainage especially at the end of the day.  Reports that she has pain with ambulation due to her heel wounds.  Reports that she has remote history of blood clots but has not been on blood thinners for a long time.  Patient denies any fever, chills, chest

## 2025-04-25 NOTE — PROGRESS NOTES
TRANSFER - IN REPORT:    Verbal report received from RN on Fanny Cho  being received from ED for routine progression of patient care      Report consisted of patient's Situation, Background, Assessment and   Recommendations(SBAR).     Information from the following report(s) Nurse Handoff Report was reviewed with the receiving nurse.    Opportunity for questions and clarification was provided.      Assessment completed upon patient's arrival to unit and care assumed.

## 2025-04-26 LAB
ANION GAP SERPL CALC-SCNC: 9 MMOL/L (ref 7–16)
BASOPHILS # BLD: 0.08 K/UL (ref 0–0.2)
BASOPHILS NFR BLD: 1.5 % (ref 0–2)
BUN SERPL-MCNC: 16 MG/DL (ref 8–23)
CALCIUM SERPL-MCNC: 8.8 MG/DL (ref 8.8–10.2)
CHLORIDE SERPL-SCNC: 107 MMOL/L (ref 98–107)
CO2 SERPL-SCNC: 23 MMOL/L (ref 20–29)
CREAT SERPL-MCNC: 0.84 MG/DL (ref 0.6–1.1)
DIFFERENTIAL METHOD BLD: ABNORMAL
EOSINOPHIL # BLD: 0.42 K/UL (ref 0–0.8)
EOSINOPHIL NFR BLD: 7.8 % (ref 0.5–7.8)
ERYTHROCYTE [DISTWIDTH] IN BLOOD BY AUTOMATED COUNT: 17.3 % (ref 11.9–14.6)
GLUCOSE SERPL-MCNC: 97 MG/DL (ref 70–99)
HCT VFR BLD AUTO: 31.8 % (ref 35.8–46.3)
HGB BLD-MCNC: 9.6 G/DL (ref 11.7–15.4)
IMM GRANULOCYTES # BLD AUTO: 0.02 K/UL (ref 0–0.5)
IMM GRANULOCYTES NFR BLD AUTO: 0.4 % (ref 0–5)
LYMPHOCYTES # BLD: 1.81 K/UL (ref 0.5–4.6)
LYMPHOCYTES NFR BLD: 33.5 % (ref 13–44)
MCH RBC QN AUTO: 28 PG (ref 26.1–32.9)
MCHC RBC AUTO-ENTMCNC: 30.2 G/DL (ref 31.4–35)
MCV RBC AUTO: 92.7 FL (ref 82–102)
MONOCYTES # BLD: 0.43 K/UL (ref 0.1–1.3)
MONOCYTES NFR BLD: 8 % (ref 4–12)
NEUTS SEG # BLD: 2.64 K/UL (ref 1.7–8.2)
NEUTS SEG NFR BLD: 48.8 % (ref 43–78)
NRBC # BLD: 0 K/UL (ref 0–0.2)
PLATELET # BLD AUTO: 189 K/UL (ref 150–450)
PMV BLD AUTO: 10 FL (ref 9.4–12.3)
POTASSIUM SERPL-SCNC: 3.7 MMOL/L (ref 3.5–5.1)
RBC # BLD AUTO: 3.43 M/UL (ref 4.05–5.2)
SODIUM SERPL-SCNC: 139 MMOL/L (ref 136–145)
WBC # BLD AUTO: 5.4 K/UL (ref 4.3–11.1)

## 2025-04-26 PROCEDURE — 2580000003 HC RX 258: Performed by: HOSPITALIST

## 2025-04-26 PROCEDURE — 80048 BASIC METABOLIC PNL TOTAL CA: CPT

## 2025-04-26 PROCEDURE — 96366 THER/PROPH/DIAG IV INF ADDON: CPT

## 2025-04-26 PROCEDURE — 96368 THER/DIAG CONCURRENT INF: CPT

## 2025-04-26 PROCEDURE — 1100000000 HC RM PRIVATE

## 2025-04-26 PROCEDURE — 2500000003 HC RX 250 WO HCPCS: Performed by: HOSPITALIST

## 2025-04-26 PROCEDURE — 6370000000 HC RX 637 (ALT 250 FOR IP): Performed by: INTERNAL MEDICINE

## 2025-04-26 PROCEDURE — 85025 COMPLETE CBC W/AUTO DIFF WBC: CPT

## 2025-04-26 PROCEDURE — 96372 THER/PROPH/DIAG INJ SC/IM: CPT

## 2025-04-26 PROCEDURE — 96376 TX/PRO/DX INJ SAME DRUG ADON: CPT

## 2025-04-26 PROCEDURE — 36415 COLL VENOUS BLD VENIPUNCTURE: CPT

## 2025-04-26 PROCEDURE — 6360000002 HC RX W HCPCS: Performed by: HOSPITALIST

## 2025-04-26 PROCEDURE — 6370000000 HC RX 637 (ALT 250 FOR IP): Performed by: HOSPITALIST

## 2025-04-26 RX ADMIN — FAMOTIDINE 20 MG: 10 INJECTION, SOLUTION INTRAVENOUS at 05:36

## 2025-04-26 RX ADMIN — SODIUM CHLORIDE, PRESERVATIVE FREE 10 ML: 5 INJECTION INTRAVENOUS at 09:03

## 2025-04-26 RX ADMIN — ENOXAPARIN SODIUM 30 MG: 100 INJECTION SUBCUTANEOUS at 21:05

## 2025-04-26 RX ADMIN — LOPERAMIDE HYDROCHLORIDE 2 MG: 2 CAPSULE ORAL at 10:22

## 2025-04-26 RX ADMIN — LINEZOLID 600 MG: 600 INJECTION, SOLUTION INTRAVENOUS at 21:05

## 2025-04-26 RX ADMIN — CHOLECALCIFEROL TAB 125 MCG (5000 UNIT) 5000 UNITS: 125 TAB at 09:03

## 2025-04-26 RX ADMIN — PIPERACILLIN AND TAZOBACTAM 3375 MG: 3; .375 INJECTION, POWDER, LYOPHILIZED, FOR SOLUTION INTRAVENOUS at 09:01

## 2025-04-26 RX ADMIN — PIPERACILLIN AND TAZOBACTAM 3375 MG: 3; .375 INJECTION, POWDER, LYOPHILIZED, FOR SOLUTION INTRAVENOUS at 01:32

## 2025-04-26 RX ADMIN — Medication 1 CAPSULE: at 08:49

## 2025-04-26 RX ADMIN — ROSUVASTATIN CALCIUM 5 MG: 5 TABLET, FILM COATED ORAL at 21:05

## 2025-04-26 RX ADMIN — ALLOPURINOL 300 MG: 100 TABLET ORAL at 08:49

## 2025-04-26 RX ADMIN — SODIUM CHLORIDE, PRESERVATIVE FREE 10 ML: 5 INJECTION INTRAVENOUS at 21:08

## 2025-04-26 RX ADMIN — Medication 3 MG: at 21:05

## 2025-04-26 RX ADMIN — Medication 1 CAPSULE: at 17:48

## 2025-04-26 RX ADMIN — PIPERACILLIN AND TAZOBACTAM 3375 MG: 3; .375 INJECTION, POWDER, LYOPHILIZED, FOR SOLUTION INTRAVENOUS at 17:54

## 2025-04-26 RX ADMIN — LOPERAMIDE HYDROCHLORIDE 2 MG: 2 CAPSULE ORAL at 12:04

## 2025-04-26 RX ADMIN — LINEZOLID 600 MG: 600 INJECTION, SOLUTION INTRAVENOUS at 09:03

## 2025-04-26 RX ADMIN — TRAMADOL HYDROCHLORIDE 50 MG: 50 TABLET, COATED ORAL at 10:57

## 2025-04-26 RX ADMIN — ENOXAPARIN SODIUM 30 MG: 100 INJECTION SUBCUTANEOUS at 08:50

## 2025-04-26 RX ADMIN — SODIUM CHLORIDE: 0.9 INJECTION, SOLUTION INTRAVENOUS at 22:11

## 2025-04-26 RX ADMIN — FAMOTIDINE 20 MG: 10 INJECTION, SOLUTION INTRAVENOUS at 17:48

## 2025-04-26 RX ADMIN — LOPERAMIDE HYDROCHLORIDE 2 MG: 2 CAPSULE ORAL at 18:26

## 2025-04-26 ASSESSMENT — PAIN SCALES - GENERAL: PAINLEVEL_OUTOF10: 5

## 2025-04-26 ASSESSMENT — PAIN DESCRIPTION - LOCATION: LOCATION: HIP;HEAD

## 2025-04-26 ASSESSMENT — PAIN DESCRIPTION - ORIENTATION: ORIENTATION: RIGHT

## 2025-04-26 NOTE — PROGRESS NOTES
Consulted for Bilat heels. Daughter at bedside. Pt from [facility]; at one point chronic BLE venous wounds were dressed with Unna boot compression wraps. HH was assisting with BLE wraps every MWF, but had multiple issues with supplies. Daughter with work history in PT, started to assist in applying Unna boot compression wraps x2-3/week. BLE wounds eventually healed but started to note more pain over the past few days. Pt hx of metallic hardware placed into bilat ankles. Pt would benefit to continue HH to assist w/ dressing changes.    Will hold off on ACE/compression wraps, allow for IV abx 48hrs. Dressed Bilat lateral ankle wounds with wound cleanser, Opticell Ag, ABD, rolled gauze; change every MWF&PRN. Leave in place over weekend, change only if saturated. Wound team to reassess/place Unna boot compression wraps Monday if still inpatient.    L lateral ankle; 6x2x0.4cm, pink/red, granular, slough, subcutaneous, moderate serosanguinous, no odor.      R lateral ankle; 5x1x0.4cm, pink/red, granular, slough, subcutaneous, small serosanguinous, no odor.       R heel w/ healed DTI/blister; dry brown fibrin that was easily removed with cleaning, new epithelialization, no drainage/odor. Continue to elevate heels with pillows.      L heel intact w/ blanchable erythema, boggy, no drainage/odor. Pt c/o tenderness. Continue to elevate heels with pillows.

## 2025-04-26 NOTE — PROGRESS NOTES
Hospitalist Progress Note   Admit Date:  2025  1:27 PM   Name:  Fanny Cho   Age:  79 y.o.  Sex:  female  :  1945   MRN:  [id number]   Room:  [facility]/    Presenting/Chief Complaint: Skin Problem and Altered Mental Status     Reason(s) for Admission: Pressure sore on heel [L89.609]  Bilateral lower leg cellulitis [L03.116, L03.115]  Pressure injury of skin of heel, unspecified injury stage, unspecified laterality [L89.609]     Hospital Course:   Fanny Cho is a 79 y.o. female with medical history of  obesity BMI 38.7, HTN, dyslipidemia, gout, debility, venous stasis of B/L LE brought in from [facility] in view of bilateral foot pain and heel ulcers.   Patient lives in assisted living at the [facility] and was brought in by the daughter with concern patient for confusion as well as discomfort in her bilateral heels.      On presentation, patient was lethargic and information was obtained from the daughter.  For the past several days, patient has been having weakness, lethargy as well as heel pain with worsening drainage from the heel.  Also complains of dysuria without any urinary frequency or urgency.    In the emergency room, patient was hemodynamically stable and saturating well on room air.  Bilateral x-ray of her feet show advanced degenerative changes in both feet, flexion deformities in the DIP of multiple left toes, soft tissue swelling predominantly on the left.  Laboratory workup was fairly unremarkable except for albumin of 2.3.     Subjective & 24hr Events:   Patient was seen and examined at bedside.  Patient sitting up in a recliner.  Patient daughter is at bedside.  Patient reports that she is feeling better with intermittent pain in her lower extremities.  Took tramadol last night and believes that she may have had some confusion.  Patient did pull out IV lines due to confusion.  And she feels embarrassed about it.  Daughter reports that patient did not drink much yesterday  ml/min/1.73m2    Calcium 9.4 8.8 - 10.2 MG/DL   Culture, Blood 1    Collection Time: 04/24/25  2:22 PM    Specimen: Blood   Result Value Ref Range    Special Requests NO SPECIAL REQUESTS      Culture NO GROWTH 2 DAYS     Culture, Blood 1    Collection Time: 04/24/25  4:01 PM    Specimen: Blood   Result Value Ref Range    Special Requests RIGHT  Antecubital        Culture NO GROWTH 2 DAYS     Urinalysis with Reflex to Culture    Collection Time: 04/24/25  6:12 PM    Specimen: Urine   Result Value Ref Range    Color, UA YELLOW/STRAW      Appearance CLEAR      Specific Gravity, UA 1.014 1.001 - 1.023      pH, Urine 6.5 5.0 - 9.0      Protein, UA Negative NEG mg/dL    Glucose, Ur Negative NEG mg/dL    Ketones, Urine Negative NEG mg/dL    Bilirubin, Urine Negative NEG      Blood, Urine Negative NEG      Urobilinogen, Urine 0.2 0.2 - 1.0 EU/dL    Nitrite, Urine Negative NEG      Leukocyte Esterase, Urine Negative NEG      Urine Culture if Indicated CULTURE NOT INDICATED BY UA RESULT      WBC, UA 0-4 U4 /hpf    RBC, UA 0-5 U5 /hpf    BACTERIA, URINE Negative NEG /hpf    Epithelial Cells, UA 5-10 (A) U5 /hpf    Hyaline Casts, UA 0-2 /lpf    Casts 0 0 /lpf    Crystals 0 0 /LPF    Mucus, UA 0 0 /lpf   Culture, Wound (with Gram Stain)    Collection Time: 04/24/25  7:06 PM    Specimen: Foot, Right   Result Value Ref Range    Special Requests NO SPECIAL REQUESTS      Gram Stain OCCASIONAL WBCS SEEN      Gram Stain OCCASIONAL Gram negative rods      Gram Stain OCCASIONAL GRAM POSITIVE COCCI IN PAIRS      Culture (A)       HEAVY Gram negative rods IDENTIFICATION AND SUSCEPTIBILITY TO FOLLOW    Culture HEAVY Staphylococcus aureus SENSITIVITY TO FOLLOW (A)      Culture (A)       MODERATE PRESUMPTIVE ENTEROCOCCUS SPECIES IDENTIFICATION AND SUSCEPTIBILITY TO FOLLOW   Culture, Wound (with Gram Stain)    Collection Time: 04/24/25  7:08 PM    Specimen: Foot, Left   Result Value Ref Range    Special Requests NO SPECIAL REQUESTS      Gram

## 2025-04-26 NOTE — PROGRESS NOTES
Intermittent confusion noted overnight.  Pt pulled out PIV x3.  Pt did not rest well.  Otherwise uneventful shift.  Bed in low position, wheels locked, call light within reach.

## 2025-04-27 ENCOUNTER — APPOINTMENT (OUTPATIENT)
Dept: GENERAL RADIOLOGY | Age: 80
DRG: 593 | End: 2025-04-27
Payer: MEDICARE

## 2025-04-27 PROBLEM — M25.551 RIGHT HIP PAIN: Status: ACTIVE | Noted: 2025-04-27

## 2025-04-27 LAB
ANION GAP SERPL CALC-SCNC: 10 MMOL/L (ref 7–16)
BASOPHILS # BLD: 0.07 K/UL (ref 0–0.2)
BASOPHILS NFR BLD: 1.3 % (ref 0–2)
BUN SERPL-MCNC: 14 MG/DL (ref 8–23)
CALCIUM SERPL-MCNC: 8.7 MG/DL (ref 8.8–10.2)
CHLORIDE SERPL-SCNC: 106 MMOL/L (ref 98–107)
CO2 SERPL-SCNC: 24 MMOL/L (ref 20–29)
CREAT SERPL-MCNC: 0.87 MG/DL (ref 0.6–1.1)
CRP SERPL-MCNC: 2.1 MG/DL (ref 0–0.4)
DIFFERENTIAL METHOD BLD: ABNORMAL
EOSINOPHIL # BLD: 0.51 K/UL (ref 0–0.8)
EOSINOPHIL NFR BLD: 9.7 % (ref 0.5–7.8)
ERYTHROCYTE [DISTWIDTH] IN BLOOD BY AUTOMATED COUNT: 17.2 % (ref 11.9–14.6)
GLUCOSE SERPL-MCNC: 94 MG/DL (ref 70–99)
HCT VFR BLD AUTO: 31.2 % (ref 35.8–46.3)
HGB BLD-MCNC: 9.6 G/DL (ref 11.7–15.4)
IMM GRANULOCYTES # BLD AUTO: 0.01 K/UL (ref 0–0.5)
IMM GRANULOCYTES NFR BLD AUTO: 0.2 % (ref 0–5)
LYMPHOCYTES # BLD: 1.74 K/UL (ref 0.5–4.6)
LYMPHOCYTES NFR BLD: 33 % (ref 13–44)
MCH RBC QN AUTO: 27.7 PG (ref 26.1–32.9)
MCHC RBC AUTO-ENTMCNC: 30.8 G/DL (ref 31.4–35)
MCV RBC AUTO: 90.2 FL (ref 82–102)
MONOCYTES # BLD: 0.54 K/UL (ref 0.1–1.3)
MONOCYTES NFR BLD: 10.2 % (ref 4–12)
NEUTS SEG # BLD: 2.4 K/UL (ref 1.7–8.2)
NEUTS SEG NFR BLD: 45.6 % (ref 43–78)
NRBC # BLD: 0 K/UL (ref 0–0.2)
PLATELET # BLD AUTO: 177 K/UL (ref 150–450)
PMV BLD AUTO: 9.6 FL (ref 9.4–12.3)
POTASSIUM SERPL-SCNC: 3.7 MMOL/L (ref 3.5–5.1)
RBC # BLD AUTO: 3.46 M/UL (ref 4.05–5.2)
SODIUM SERPL-SCNC: 140 MMOL/L (ref 136–145)
WBC # BLD AUTO: 5.3 K/UL (ref 4.3–11.1)

## 2025-04-27 PROCEDURE — 80048 BASIC METABOLIC PNL TOTAL CA: CPT

## 2025-04-27 PROCEDURE — 6370000000 HC RX 637 (ALT 250 FOR IP): Performed by: INTERNAL MEDICINE

## 2025-04-27 PROCEDURE — 85025 COMPLETE CBC W/AUTO DIFF WBC: CPT

## 2025-04-27 PROCEDURE — 2580000003 HC RX 258: Performed by: INTERNAL MEDICINE

## 2025-04-27 PROCEDURE — 6360000002 HC RX W HCPCS: Performed by: INTERNAL MEDICINE

## 2025-04-27 PROCEDURE — 86140 C-REACTIVE PROTEIN: CPT

## 2025-04-27 PROCEDURE — 72170 X-RAY EXAM OF PELVIS: CPT

## 2025-04-27 PROCEDURE — 6370000000 HC RX 637 (ALT 250 FOR IP): Performed by: HOSPITALIST

## 2025-04-27 PROCEDURE — 2580000003 HC RX 258: Performed by: HOSPITALIST

## 2025-04-27 PROCEDURE — 1100000000 HC RM PRIVATE

## 2025-04-27 PROCEDURE — 36415 COLL VENOUS BLD VENIPUNCTURE: CPT

## 2025-04-27 PROCEDURE — 6360000002 HC RX W HCPCS: Performed by: HOSPITALIST

## 2025-04-27 PROCEDURE — 2500000003 HC RX 250 WO HCPCS: Performed by: HOSPITALIST

## 2025-04-27 RX ADMIN — Medication 3 MG: at 20:12

## 2025-04-27 RX ADMIN — FAMOTIDINE 20 MG: 10 INJECTION, SOLUTION INTRAVENOUS at 05:43

## 2025-04-27 RX ADMIN — LOPERAMIDE HYDROCHLORIDE 2 MG: 2 CAPSULE ORAL at 18:24

## 2025-04-27 RX ADMIN — LINEZOLID 600 MG: 600 INJECTION, SOLUTION INTRAVENOUS at 20:18

## 2025-04-27 RX ADMIN — PIPERACILLIN AND TAZOBACTAM 3375 MG: 3; .375 INJECTION, POWDER, LYOPHILIZED, FOR SOLUTION INTRAVENOUS at 09:20

## 2025-04-27 RX ADMIN — LOPERAMIDE HYDROCHLORIDE 2 MG: 2 CAPSULE ORAL at 14:29

## 2025-04-27 RX ADMIN — PIPERACILLIN AND TAZOBACTAM 3375 MG: 3; .375 INJECTION, POWDER, LYOPHILIZED, FOR SOLUTION INTRAVENOUS at 01:58

## 2025-04-27 RX ADMIN — LINEZOLID 600 MG: 600 INJECTION, SOLUTION INTRAVENOUS at 09:11

## 2025-04-27 RX ADMIN — ENOXAPARIN SODIUM 30 MG: 100 INJECTION SUBCUTANEOUS at 20:12

## 2025-04-27 RX ADMIN — SODIUM CHLORIDE, PRESERVATIVE FREE 10 ML: 5 INJECTION INTRAVENOUS at 09:07

## 2025-04-27 RX ADMIN — TRAMADOL HYDROCHLORIDE 50 MG: 50 TABLET, COATED ORAL at 14:29

## 2025-04-27 RX ADMIN — FAMOTIDINE 20 MG: 10 INJECTION, SOLUTION INTRAVENOUS at 18:10

## 2025-04-27 RX ADMIN — CHOLECALCIFEROL TAB 125 MCG (5000 UNIT) 5000 UNITS: 125 TAB at 08:52

## 2025-04-27 RX ADMIN — Medication 1 CAPSULE: at 08:51

## 2025-04-27 RX ADMIN — SODIUM CHLORIDE: 0.9 INJECTION, SOLUTION INTRAVENOUS at 20:15

## 2025-04-27 RX ADMIN — TRAMADOL HYDROCHLORIDE 50 MG: 50 TABLET, COATED ORAL at 08:57

## 2025-04-27 RX ADMIN — ROSUVASTATIN CALCIUM 5 MG: 5 TABLET, FILM COATED ORAL at 20:12

## 2025-04-27 RX ADMIN — SODIUM CHLORIDE, PRESERVATIVE FREE 10 ML: 5 INJECTION INTRAVENOUS at 20:12

## 2025-04-27 RX ADMIN — MEROPENEM 1000 MG: 1 INJECTION INTRAVENOUS at 23:13

## 2025-04-27 RX ADMIN — LOPERAMIDE HYDROCHLORIDE 2 MG: 2 CAPSULE ORAL at 08:50

## 2025-04-27 RX ADMIN — Medication 1 CAPSULE: at 18:10

## 2025-04-27 RX ADMIN — MEROPENEM 1000 MG: 1 INJECTION INTRAVENOUS at 14:47

## 2025-04-27 RX ADMIN — ALLOPURINOL 300 MG: 100 TABLET ORAL at 08:53

## 2025-04-27 RX ADMIN — ENOXAPARIN SODIUM 30 MG: 100 INJECTION SUBCUTANEOUS at 08:50

## 2025-04-27 ASSESSMENT — PAIN DESCRIPTION - LOCATION
LOCATION: BACK
LOCATION: BACK

## 2025-04-27 ASSESSMENT — PAIN SCALES - GENERAL
PAINLEVEL_OUTOF10: 6
PAINLEVEL_OUTOF10: 6

## 2025-04-27 NOTE — PROGRESS NOTES
Hospitalist Progress Note   Admit Date:  2025  1:27 PM   Name:  Fanny Cho   Age:  79 y.o.  Sex:  female  :  1945   MRN:  [id number]   Room:  [facility]/    Presenting/Chief Complaint: Skin Problem and Altered Mental Status     Reason(s) for Admission: Pressure sore on heel [L89.609]  Bilateral lower leg cellulitis [L03.116, L03.115]  Pressure injury of skin of heel, unspecified injury stage, unspecified laterality [L89.609]     Hospital Course:   Fanny Cho is a 79 y.o. female with medical history of  obesity BMI 38.7, HTN, dyslipidemia, gout, debility, venous stasis of B/L LE brought in from [facility] in view of bilateral foot pain and heel ulcers.   Patient lives in assisted living at the [facility] and was brought in by the daughter with concern patient for confusion as well as discomfort in her bilateral heels.      On presentation, patient was lethargic and information was obtained from the daughter.  For the past several days, patient has been having weakness, lethargy as well as heel pain with worsening drainage from the heel.  Also complains of dysuria without any urinary frequency or urgency.    In the emergency room, patient was hemodynamically stable and saturating well on room air.  Bilateral x-ray of her feet show advanced degenerative changes in both feet, flexion deformities in the DIP of multiple left toes, soft tissue swelling predominantly on the left.  Laboratory workup was fairly unremarkable except for albumin of 2.3.     Subjective & 24hr Events:   Patient was seen and examined at bedside.  Patient laying in bed.  About to get up to use the bedside commode.  Reports feeling well with minimal leg/heel pain.  Patient wound culture growing heavy Pseudomonas, MRSA and vancomycin-resistant Enterococcus faecalis.    Assessment & Plan:   Pressure sore on heel  Venous stasis dermatitis of both lower extremities  - Wound Care has been consulted.  -Blood culture without any

## 2025-04-27 NOTE — CONSULTS
GRAM POSITIVE COCCI IN PAIRS           Culture       HEAVY PSEUDOMONAS AERUGINOSA Additional Susceptibility Testing To Follow                  HEAVY Methicillin Resistant Staphylococcus aureus                  MODERATE Enterococcus faecalis          Susceptibility        Pseudomonas aeruginosa      BACTERIAL SUSCEPTIBILITY PANEL GLENDY (Preliminary)      ciprofloxacin >=4 ug/mL Resistant      levofloxacin >=8 ug/mL Resistant      meropenem 2 ug/mL Sensitive      piperacillin-tazobactam >=128 ug/mL Resistant      tobramycin <=1 ug/mL Sensitive                       Susceptibility        Methicillin-Resistant Staphylococcus aureus      BACTERIAL SUSCEPTIBILITY PANEL GLENDY (Preliminary)      linezolid 2 ug/mL Sensitive      oxacillin >=4 ug/mL Resistant      rifampin <=0.5 ug/mL Sensitive  [1]       tetracycline >=16 ug/mL Resistant  [2]       trimethoprim-sulfamethoxazole <=10 ug/mL Sensitive      vancomycin 1 ug/mL Sensitive                   [1]  Rifampin is not to be used for mono-therapy.     [2]  Organisms that are susceptible to tetracycline are also considered susceptible to doxycycline and minocycline. however,some organisms that are intermediate or resistant to tetracycline may be susceptible to doxycycline and minocycline.                Susceptibility        Enterococcus faecalis      BACTERIAL SUSCEPTIBILITY PANEL GLENDY (Preliminary)      ampicillin <=2 ug/mL Sensitive      Penicillin G 4 ug/mL Sensitive      vancomycin 1 ug/mL Sensitive                           Culture, Blood 1 [[id number]] Collected: 04/24/25 1601    Order Status: Completed Specimen: Blood Updated: 04/27/25 0742     Special Requests --        RIGHT  Antecubital       Culture NO GROWTH 3 DAYS       Culture, Blood 1 [[id number]] Collected: 04/24/25 1422    Order Status: Completed Specimen: Blood Updated: 04/27/25 0742     Special Requests NO SPECIAL REQUESTS        Culture NO GROWTH 3 DAYS               Imaging:     I have personally reviewed  COMPLETE (PRN CONTRAST/BUBBLE/STRAIN/3D) 12/02/2024  3:23 PM (Final)    Interpretation Summary    Left Ventricle: Low normal left ventricular systolic function with a visually estimated EF of 50 - 55%. Left ventricle size is normal. Normal wall thickness. Normal wall motion.    Tricuspid Valve: Mild regurgitation. The estimated RVSP is 26 mmHg.    Signed by: Juanito Romeo MD on 12/2/2024  3:23 PM      Signed By: Justin Bartlett MD     April 27, 2025      Part of this note may have been written by using a voice dictation software.  The note has been proof read but may still contain some grammatical/other typographical errors.\

## 2025-04-28 LAB
ANION GAP SERPL CALC-SCNC: 11 MMOL/L (ref 7–16)
BACTERIA SPEC CULT: ABNORMAL
BASOPHILS # BLD: 0.07 K/UL (ref 0–0.2)
BASOPHILS NFR BLD: 1.1 % (ref 0–2)
BUN SERPL-MCNC: 16 MG/DL (ref 8–23)
CALCIUM SERPL-MCNC: 8.7 MG/DL (ref 8.8–10.2)
CHLORIDE SERPL-SCNC: 103 MMOL/L (ref 98–107)
CO2 SERPL-SCNC: 22 MMOL/L (ref 20–29)
CREAT SERPL-MCNC: 0.87 MG/DL (ref 0.6–1.1)
DIFFERENTIAL METHOD BLD: ABNORMAL
EOSINOPHIL # BLD: 0.42 K/UL (ref 0–0.8)
EOSINOPHIL NFR BLD: 6.5 % (ref 0.5–7.8)
ERYTHROCYTE [DISTWIDTH] IN BLOOD BY AUTOMATED COUNT: 17.6 % (ref 11.9–14.6)
GLUCOSE SERPL-MCNC: 89 MG/DL (ref 70–99)
GRAM STN SPEC: ABNORMAL
HCT VFR BLD AUTO: 33 % (ref 35.8–46.3)
HGB BLD-MCNC: 10.4 G/DL (ref 11.7–15.4)
IMM GRANULOCYTES # BLD AUTO: 0.02 K/UL (ref 0–0.5)
IMM GRANULOCYTES NFR BLD AUTO: 0.3 % (ref 0–5)
LYMPHOCYTES # BLD: 1.62 K/UL (ref 0.5–4.6)
LYMPHOCYTES NFR BLD: 25.2 % (ref 13–44)
MCH RBC QN AUTO: 28.3 PG (ref 26.1–32.9)
MCHC RBC AUTO-ENTMCNC: 31.5 G/DL (ref 31.4–35)
MCV RBC AUTO: 89.7 FL (ref 82–102)
MONOCYTES # BLD: 0.73 K/UL (ref 0.1–1.3)
MONOCYTES NFR BLD: 11.4 % (ref 4–12)
NEUTS SEG # BLD: 3.57 K/UL (ref 1.7–8.2)
NEUTS SEG NFR BLD: 55.5 % (ref 43–78)
NRBC # BLD: 0 K/UL (ref 0–0.2)
PLATELET # BLD AUTO: 144 K/UL (ref 150–450)
PMV BLD AUTO: 10.8 FL (ref 9.4–12.3)
POTASSIUM SERPL-SCNC: 4 MMOL/L (ref 3.5–5.1)
RBC # BLD AUTO: 3.68 M/UL (ref 4.05–5.2)
SERVICE CMNT-IMP: ABNORMAL
SERVICE CMNT-IMP: ABNORMAL
SODIUM SERPL-SCNC: 136 MMOL/L (ref 136–145)
WBC # BLD AUTO: 6.4 K/UL (ref 4.3–11.1)

## 2025-04-28 PROCEDURE — 6360000002 HC RX W HCPCS: Performed by: HOSPITALIST

## 2025-04-28 PROCEDURE — 36415 COLL VENOUS BLD VENIPUNCTURE: CPT

## 2025-04-28 PROCEDURE — 2W1MX6Z COMPRESSION OF LEFT LOWER EXTREMITY USING PRESSURE DRESSING: ICD-10-PCS | Performed by: INTERNAL MEDICINE

## 2025-04-28 PROCEDURE — 2580000003 HC RX 258: Performed by: HOSPITALIST

## 2025-04-28 PROCEDURE — 6370000000 HC RX 637 (ALT 250 FOR IP): Performed by: INTERNAL MEDICINE

## 2025-04-28 PROCEDURE — 2580000003 HC RX 258: Performed by: INTERNAL MEDICINE

## 2025-04-28 PROCEDURE — 97535 SELF CARE MNGMENT TRAINING: CPT

## 2025-04-28 PROCEDURE — 97112 NEUROMUSCULAR REEDUCATION: CPT

## 2025-04-28 PROCEDURE — 85025 COMPLETE CBC W/AUTO DIFF WBC: CPT

## 2025-04-28 PROCEDURE — 6360000002 HC RX W HCPCS: Performed by: INTERNAL MEDICINE

## 2025-04-28 PROCEDURE — 2500000003 HC RX 250 WO HCPCS: Performed by: HOSPITALIST

## 2025-04-28 PROCEDURE — 1100000000 HC RM PRIVATE

## 2025-04-28 PROCEDURE — 2W1LX6Z COMPRESSION OF RIGHT LOWER EXTREMITY USING PRESSURE DRESSING: ICD-10-PCS | Performed by: INTERNAL MEDICINE

## 2025-04-28 PROCEDURE — 97530 THERAPEUTIC ACTIVITIES: CPT

## 2025-04-28 PROCEDURE — 6370000000 HC RX 637 (ALT 250 FOR IP): Performed by: HOSPITALIST

## 2025-04-28 PROCEDURE — 29580 STRAPPING UNNA BOOT: CPT

## 2025-04-28 PROCEDURE — 80048 BASIC METABOLIC PNL TOTAL CA: CPT

## 2025-04-28 RX ORDER — LINEZOLID 600 MG/1
600 TABLET, FILM COATED ORAL EVERY 12 HOURS SCHEDULED
Status: COMPLETED | OUTPATIENT
Start: 2025-04-28 | End: 2025-05-01

## 2025-04-28 RX ORDER — FAMOTIDINE 20 MG/1
20 TABLET, FILM COATED ORAL 2 TIMES DAILY
Status: DISCONTINUED | OUTPATIENT
Start: 2025-04-28 | End: 2025-05-02 | Stop reason: HOSPADM

## 2025-04-28 RX ADMIN — SODIUM CHLORIDE, PRESERVATIVE FREE 10 ML: 5 INJECTION INTRAVENOUS at 08:57

## 2025-04-28 RX ADMIN — FAMOTIDINE 20 MG: 10 INJECTION, SOLUTION INTRAVENOUS at 06:10

## 2025-04-28 RX ADMIN — LOPERAMIDE HYDROCHLORIDE 2 MG: 2 CAPSULE ORAL at 17:41

## 2025-04-28 RX ADMIN — SODIUM CHLORIDE, PRESERVATIVE FREE 10 ML: 5 INJECTION INTRAVENOUS at 21:03

## 2025-04-28 RX ADMIN — MEROPENEM 1000 MG: 1 INJECTION INTRAVENOUS at 15:17

## 2025-04-28 RX ADMIN — Medication 1 CAPSULE: at 08:57

## 2025-04-28 RX ADMIN — FAMOTIDINE 20 MG: 20 TABLET, FILM COATED ORAL at 21:00

## 2025-04-28 RX ADMIN — LOPERAMIDE HYDROCHLORIDE 2 MG: 2 CAPSULE ORAL at 10:22

## 2025-04-28 RX ADMIN — ENOXAPARIN SODIUM 30 MG: 100 INJECTION SUBCUTANEOUS at 08:56

## 2025-04-28 RX ADMIN — CHOLECALCIFEROL TAB 125 MCG (5000 UNIT) 5000 UNITS: 125 TAB at 08:57

## 2025-04-28 RX ADMIN — Medication 3 MG: at 20:59

## 2025-04-28 RX ADMIN — ENOXAPARIN SODIUM 30 MG: 100 INJECTION SUBCUTANEOUS at 21:00

## 2025-04-28 RX ADMIN — LINEZOLID 600 MG: 600 INJECTION, SOLUTION INTRAVENOUS at 08:55

## 2025-04-28 RX ADMIN — MEROPENEM 1000 MG: 1 INJECTION INTRAVENOUS at 23:24

## 2025-04-28 RX ADMIN — ROSUVASTATIN CALCIUM 5 MG: 5 TABLET, FILM COATED ORAL at 20:59

## 2025-04-28 RX ADMIN — ALLOPURINOL 300 MG: 100 TABLET ORAL at 08:57

## 2025-04-28 RX ADMIN — MEROPENEM 1000 MG: 1 INJECTION INTRAVENOUS at 06:11

## 2025-04-28 RX ADMIN — Medication 1 CAPSULE: at 17:21

## 2025-04-28 RX ADMIN — LINEZOLID 600 MG: 600 TABLET, FILM COATED ORAL at 21:00

## 2025-04-28 ASSESSMENT — PAIN DESCRIPTION - ORIENTATION: ORIENTATION: RIGHT

## 2025-04-28 ASSESSMENT — PAIN SCALES - GENERAL
PAINLEVEL_OUTOF10: 0
PAINLEVEL_OUTOF10: 0
PAINLEVEL_OUTOF10: 4

## 2025-04-28 ASSESSMENT — PAIN DESCRIPTION - LOCATION: LOCATION: BACK;HIP

## 2025-04-28 NOTE — WOUND CARE
Follow up on BLE wounds. Overall improvement, decreased erythema. Placed in BLE Unna boot compression wraps today, Pt tolerated well. Wound team to change every MWF while inpatient.    Per CM notes, Pt likely to DC to STR. Will need to continue x3/week BLE compression dressing changes.    L lateral ankle 7x2x0.3cm, pink/red, granular, epithelialization, slough, subcutaneous, small serosanguinous, no odor.        R lateral ankle 4x1x0.2cm, pink/red, granular, slough, epithelialization, scant serosanguinous, no odor.        R heel intact, dry/flaky skin.      R great metatarsal callous

## 2025-04-28 NOTE — PROGRESS NOTES
Hospitalist Progress Note   Admit Date:  2025  1:27 PM   Name:  Fanny Cho   Age:  79 y.o.  Sex:  female  :  1945   MRN:  [id number]   Room:  [facility]    Presenting/Chief Complaint: Skin Problem and Altered Mental Status     Reason(s) for Admission: Pressure sore on heel [L89.609]  Bilateral lower leg cellulitis [L03.116, L03.115]  Pressure injury of skin of heel, unspecified injury stage, unspecified laterality [L89.609]     Hospital Course:   Fanny Cho is a 79 y.o. female with medical history of  obesity BMI 38.7, HTN, dyslipidemia, gout, debility, venous stasis of B/L LE brought in from [facility] in view of bilateral foot pain and heel ulcers.   Patient lives in assisted living at the [facility] and was brought in by the daughter with concern patient for confusion as well as discomfort in her bilateral heels.      On presentation, patient was lethargic and information was obtained from the daughter.  For the past several days, patient has been having weakness, lethargy as well as heel pain with worsening drainage from the heel.  Also complains of dysuria without any urinary frequency or urgency.    In the emergency room, patient was hemodynamically stable and saturating well on room air.  Bilateral x-ray of her feet show advanced degenerative changes in both feet, flexion deformities in the DIP of multiple left toes, soft tissue swelling predominantly on the left.  Laboratory workup was fairly unremarkable except for albumin of 2.3.     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  Orthopedics was consulted.    Subjective & 24hr Events:   Patient was seen and examined at bedside.  Patient is sitting up in a recliner.  Alert awake and oriented x 3.  Does not have any acute complaint at this time.  Discussed x-ray findings of her hip.  Patient reports that she does not want any surgical intervention at this

## 2025-04-28 NOTE — PROGRESS NOTES
Nutrition Assessment  Assessment Type: Reassess  Reason for visit:  Best Practice Alert: Malnutrition Screening Tool   Malnutrition Screening Tool Score: 3    Nutrition Intervention:   Food and/or Nutrient Delivery:   Meals and Snacks:  Diet: Continue current order  Medical Food Supplements:   Medical food supplement therapy:  Continue Ensure High Protein (low calorie high protein) 160 calories, 16 grams protein per 8 ounce serving     Malnutrition Assessment:  [facility] Clinical Malnutrition Criteria  Malnutrition Status: At risk for malnutrition (historical wt loss, varied oral intake)  Nutrition Focused Physical Exam: Deferred due to pt getting up to bedside commode    Nutrition Assessment:  Food/Nutrition Related History:   Resident at [facility] since Nov 2024 per dtr at bedside, uses a manual wheelchair to go to dining room.  Pt reports eating better at [facility] and using ensure than prior to going to [facility].    Pt reports she has difficulty in chewing beef at times at [facility].  She indicates her intake overall there has continued, uses ons consistently.       Do You Have Any Cultural, Church, or Ethnic Food Preferences?: No   Weight History:   All inpt wt hx stated, current wt estimated.  Pt notes she lost wt prior but htinks she has been doing well recently.    IM office 10/23/24: 209#, 225# 8/19/24, 240# 2/2/24, 255# /22/23.  Historical wt loss per IM records over 1 year 18%.    Dtr reports 70# wt loss prior to going to [facility].  PCTs in room at [facility], asked to reste bed and weigh once she returns to bed.    Nutrition Background:       PMH obesity, HTN, DLP, gout, debility, venous stasis yenny LE.  Presented form [facility] with yenny foot pain and heel ulcers, daughter concerned about confusion.      Admitted with pressure sore on heel, venous stasis dermatitis of both lower extremities, gout, hypoalbunemia.     WC following.    Nutrition Monitoring/Evaluation:  Pt is up to recliner, alert and oriented, ate <25%

## 2025-04-28 NOTE — PROGRESS NOTES
ACUTE PHYSICAL THERAPY GOALS:   (Developed with and agreed upon by patient and/or caregiver.)  LTG:  (1.)Ms. Cho will move from supine to sit and sit to supine , scoot up and down, and roll side to side in bed with CONTACT GUARD ASSIST within 7 treatment day(s).    (2.)Ms. Cho will transfer from bed to chair and chair to bed with CONTACT GUARD ASSIST using the least restrictive device within 7 treatment day(s).    (3.)Ms. Cho will ambulate with CONTACT GUARD ASSIST for 25 feet with the least restrictive device within 7 treatment day(s).    PHYSICAL THERAPY: Daily Note AM   (Link to Caseload Tracking: PT Visit Days : 2  Time In/Out PT Charge Capture  Rehab Caseload Tracker  Orders    Fanny Cho is a 79 y.o. female   PRIMARY DIAGNOSIS: Pressure sore on heel  Pressure sore on heel [L89.609]  Bilateral lower leg cellulitis [L03.116, L03.115]  Pressure injury of skin of heel, unspecified injury stage, unspecified laterality [L89.609]       Inpatient: Payor: [facility] MEDICARE / Plan: [facility] MEDICARE-ADVANTAGE PPO / Product Type: Medicare /     ASSESSMENT:     REHAB RECOMMENDATIONS:   Recommendation to date pending progress:  Setting:  Short-term Rehab    Equipment:    To Be Determined     ASSESSMENT:  Ms. Cho was sitting in recliner on presentation and agreeable for PT.  She scooted to edge and stood with mod Ax2 and lots of additional time.  Patient was then able to pivot to BSC using RW and min Ax2 with slow short steps.. After using BSC, she stood with min Ax2 and cueing.  Worked on standing balance/tolerance while OT addressed self care activities.  Patient then transferred back to recliner.  Ortho arrived so treatment ended.  Patient did require less assistance for sit to stand and transfer today.  Will continue working towards goals.     SUBJECTIVE:   Ms. Cho states, \"That's my knees\"     Social/Functional Lives With: Alone  Type of Home: Assisted living  Home Equipment: Rollator,  Decreased step length    Weightbearing Status      Stairs      I=Independent, Mod I=Modified Independent, S=Supervision, SBA=Standby Assistance, CGA=Contact Guard Assistance,   Min=Minimal Assistance, Mod=Moderate Assistance, Max=Maximal Assistance, Total=Total Assistance, NT=Not Tested    PLAN:   FREQUENCY AND DURATION: 3 times/week for duration of hospital stay or until stated goals are met, whichever comes first.    TREATMENT:   TREATMENT:   Co-Treatment PT/OT necessary due to patient's decreased overall endurance/tolerance levels, as well as need for high level skilled assistance to complete functional transfers/mobility and functional tasks  Therapeutic Activity (23 Minutes): Therapeutic activity included Scooting, Transfer Training, Ambulation on level ground, Sitting balance , and Standing balance to improve functional Activity tolerance, Balance, Mobility, and Strength.    TREATMENT GRID:  N/A    AFTER TREATMENT PRECAUTIONS: Alarm Activated, Bed/Chair Locked, Call light within reach, Chair, Needs within reach, and RN notified    INTERDISCIPLINARY COLLABORATION:  RN/ PCT, PT/ PTA, and OT/ GOVEA    EDUCATION:    Educated patient and/or family/caregiver on the following: Assistive device indications/utilization/safety and Fall prevention strategies    TIME IN/OUT:  Time In: 1139  Time Out: 1203  Minutes: 24    A Anjelica Raymundo, PT

## 2025-04-28 NOTE — CARE COORDINATION
Per IDR this morning, pt will return to [facility] for skilled nursing or back to LTC bed when medically stable, wound care following inpt, discharge anticipated in several days, receiving IVAB's, CM will continue to follow and will need to get auth for STR when closer to discharge. CM will continue to follow.

## 2025-04-28 NOTE — CONSULTS
Orthopaedic Trauma Service  Consultation Note    Patient ID:  Fanny Cho  79 y.o.  female  1945   [id number]    Presenting/Chief Complaint: Skin Problem and Altered Mental Status    Date of Admission: 4/24/2025  1:27 PM   Reason(s) for Admission:   Pressure sore on heel [L89.609]  Bilateral lower leg cellulitis [L03.116, L03.115]  Pressure injury of skin of heel, unspecified injury stage, unspecified laterality [L89.609]     Date of Consultation: April 28, 2025  Referring Physician: Erin Cope MD    Hospital Problems:  Principal Problem:    Pressure sore on heel  Active Problems:    Venous stasis dermatitis of both lower extremities    Gout    Mixed hyperlipidemia    Obesity, morbid (HCC)    Essential hypertension    Hypoalbuminemia    Right hip pain  Resolved Problems:    * No resolved hospital problems. *    Assessment & Plan     Reviewed imaging w/ pt. 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  She does not currently want any intervention to right hip.  Will discuss injections with her daughter.    Multimodal pain control  WB status: WBAT RLE  PT/OT evals   Diet:  ADULT DIET; Regular; No Added Salt (3-4 gm)  ADULT ORAL NUTRITION SUPPLEMENT; Breakfast, Lunch, Dinner; Standard High Calorie/High Protein Oral Supplement  DVT prophylaxis: Defer to primary team    History of Present Illness   Fanny Cho is a 79 y.o. female who presented with bilateral foot and heel wounds. Also having weakness and frequent falls. Reports right hip pain. Notes she has had pain off and on for years but has noticed more popping and pain the last three weeks. Has also had some falls. She does not currently want any surgery. She also is not keen on injections but will discuss with her daughter.   -Sig PMH: venous stasis  -Sig PSH: shoulder arthroplasty, knee arthoplasty    Denies altered motor/sensation to extremities.  Denies headache, fevers/chills, nausea/vomiting, chest pain, shortness of breath.    History     Past

## 2025-04-28 NOTE — PROGRESS NOTES
ACUTE OCCUPATIONAL THERAPY GOALS:   (Developed with and agreed upon by patient and/or caregiver.)    1. Pt will toilet with min A using AE as needed   2. Pt will complete functional mobility for ADLs with CGA using AD as needed  3. Pt will complete lower body dressing with min A using AE as needed  4. Pt will complete grooming and hygiene at sink with CGA  5. Pt will demonstrate independence with HEP to promote increased BUE strength and functional use for ADLs  6. Pt will tolerate 23 minutes functional activity with 1-2 rest breaks to promote increased endurance for ADLs        Timeframe: 7 visits  OCCUPATIONAL THERAPY: Daily Note AM   OT Visit Days: 2   Time In/Out  OT Charge Capture  Rehab Caseload Tracker  OT Orders    Fanny Cho is a 79 y.o. female   PRIMARY DIAGNOSIS: Pressure sore on heel  Pressure sore on heel [L89.609]  Bilateral lower leg cellulitis [L03.116, L03.115]  Pressure injury of skin of heel, unspecified injury stage, unspecified laterality [L89.609]       Inpatient: Payor: [employer] MEDICARE / Plan: [facility] MEDICARE-ADVANTAGE PPO / Product Type: Medicare /     ASSESSMENT:     REHAB RECOMMENDATIONS:   Recommendation to date pending progress:  Setting:  Short-term Rehab    Equipment:    To Be Determined     ASSESSMENT:  Ms. Cho presents with decreased activity tolerance, LE weakness, arthritis, decreased independence with mobility, and decreased independence with LB self care. Pt completed first sit to stand with mod a x 2 and other sit to stand's with min a x 2. Pt also completed chair to bedside commode transfer with min a and toilet hygiene with with SBA in standing. Pt required mod/max a with clothing management. Pt left up in the chair with belongings in reach and ortho PA in room. Good effort. Continue POC.       SUBJECTIVE:     Ms. 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

## 2025-04-28 NOTE — PLAN OF CARE
Problem: Discharge Planning  Goal: Discharge to home or other facility with appropriate resources  Flowsheets (Taken 4/27/2025 1906 by Antonia Fernández, RN)  Discharge to home or other facility with appropriate resources:   Identify barriers to discharge with patient and caregiver   Arrange for needed discharge resources and transportation as appropriate   Identify discharge learning needs (meds, wound care, etc)   Refer to discharge planning if patient needs post-hospital services based on physician order or complex needs related to functional status, cognitive ability or social support system     Problem: Safety - Adult  Goal: Free from fall injury  Flowsheets (Taken 4/27/2025 1906 by Antonia Fernández, RN)  Free From Fall Injury: Instruct family/caregiver on patient safety     Problem: Pain  Goal: Verbalizes/displays adequate comfort level or baseline comfort level  Flowsheets (Taken 4/27/2025 1942 by Antonia Fernández, RN)  Verbalizes/displays adequate comfort level or baseline comfort level: Encourage patient to monitor pain and request assistance     Problem: Skin/Tissue Integrity - Adult  Goal: Incisions, wounds, or drain sites healing without S/S of infection  Flowsheets (Taken 4/28/2025 1229)  Incisions, Wounds, or Drain Sites Healing Without Sign and Symptoms of Infection:   Implement wound care per orders   Initiate isolation precautions as appropriate     Problem: Musculoskeletal - Adult  Goal: Return mobility to safest level of function  Flowsheets (Taken 4/28/2025 1229)  Return Mobility to Safest Level of Function:   Assess patient stability and activity tolerance for standing, transferring and ambulating with or without assistive devices   Assist with transfers and ambulation using safe patient handling equipment as needed   Ensure adequate protection for wounds/incisions during mobilization   Obtain physical therapy/occupational therapy consults as needed  Goal: Maintain proper alignment of affected  body part  Flowsheets (Taken 4/28/2025 1229)  Maintain proper alignment of affected body part:   Support and protect limb and body alignment per provider's orders   Instruct and reinforce with patient and family use of appropriate assistive device and precautions (e.g. spinal or hip dislocation precautions)     Problem: Metabolic/Fluid and Electrolytes - Adult  Goal: Electrolytes maintained within normal limits  Flowsheets (Taken 4/28/2025 2828)  Electrolytes maintained within normal limits:   Monitor labs and assess patient for signs and symptoms of electrolyte imbalances   Administer electrolyte replacement as ordered   Monitor response to electrolyte replacements, including repeat lab results as appropriate

## 2025-04-28 NOTE — PROGRESS NOTES
Pt resting in chair, denies needs at this time.  X-ray showed severe DJD right hip.  Patient declined intervention for hip.  Wound care ordered MWF. Hourly rounds completed.  Bed locked and lowered into lowest position.  Call light and personal items within reach.  Will give report to oncoming nurse.

## 2025-04-29 LAB
ANION GAP SERPL CALC-SCNC: 11 MMOL/L (ref 7–16)
BACTERIA SPEC CULT: NORMAL
BACTERIA SPEC CULT: NORMAL
BASOPHILS # BLD: 0.06 K/UL (ref 0–0.2)
BASOPHILS NFR BLD: 1 % (ref 0–2)
BUN SERPL-MCNC: 14 MG/DL (ref 8–23)
CALCIUM SERPL-MCNC: 8.6 MG/DL (ref 8.8–10.2)
CHLORIDE SERPL-SCNC: 104 MMOL/L (ref 98–107)
CO2 SERPL-SCNC: 23 MMOL/L (ref 20–29)
CREAT SERPL-MCNC: 0.82 MG/DL (ref 0.6–1.1)
DIFFERENTIAL METHOD BLD: ABNORMAL
EOSINOPHIL # BLD: 0.36 K/UL (ref 0–0.8)
EOSINOPHIL NFR BLD: 6.1 % (ref 0.5–7.8)
ERYTHROCYTE [DISTWIDTH] IN BLOOD BY AUTOMATED COUNT: 17.2 % (ref 11.9–14.6)
GLUCOSE SERPL-MCNC: 93 MG/DL (ref 70–99)
HCT VFR BLD AUTO: 29.1 % (ref 35.8–46.3)
HGB BLD-MCNC: 9.2 G/DL (ref 11.7–15.4)
IMM GRANULOCYTES # BLD AUTO: 0.02 K/UL (ref 0–0.5)
IMM GRANULOCYTES NFR BLD AUTO: 0.3 % (ref 0–5)
LYMPHOCYTES # BLD: 1.61 K/UL (ref 0.5–4.6)
LYMPHOCYTES NFR BLD: 27.4 % (ref 13–44)
MCH RBC QN AUTO: 27.7 PG (ref 26.1–32.9)
MCHC RBC AUTO-ENTMCNC: 31.6 G/DL (ref 31.4–35)
MCV RBC AUTO: 87.7 FL (ref 82–102)
MONOCYTES # BLD: 0.63 K/UL (ref 0.1–1.3)
MONOCYTES NFR BLD: 10.7 % (ref 4–12)
NEUTS SEG # BLD: 3.19 K/UL (ref 1.7–8.2)
NEUTS SEG NFR BLD: 54.5 % (ref 43–78)
NRBC # BLD: 0 K/UL (ref 0–0.2)
PLATELET # BLD AUTO: 172 K/UL (ref 150–450)
PMV BLD AUTO: 9.9 FL (ref 9.4–12.3)
POTASSIUM SERPL-SCNC: 4 MMOL/L (ref 3.5–5.1)
RBC # BLD AUTO: 3.32 M/UL (ref 4.05–5.2)
SERVICE CMNT-IMP: NORMAL
SERVICE CMNT-IMP: NORMAL
SODIUM SERPL-SCNC: 138 MMOL/L (ref 136–145)
WBC # BLD AUTO: 5.9 K/UL (ref 4.3–11.1)

## 2025-04-29 PROCEDURE — 80048 BASIC METABOLIC PNL TOTAL CA: CPT

## 2025-04-29 PROCEDURE — 2500000003 HC RX 250 WO HCPCS: Performed by: HOSPITALIST

## 2025-04-29 PROCEDURE — 85025 COMPLETE CBC W/AUTO DIFF WBC: CPT

## 2025-04-29 PROCEDURE — 2580000003 HC RX 258: Performed by: HOSPITALIST

## 2025-04-29 PROCEDURE — 97112 NEUROMUSCULAR REEDUCATION: CPT

## 2025-04-29 PROCEDURE — 6360000002 HC RX W HCPCS: Performed by: INTERNAL MEDICINE

## 2025-04-29 PROCEDURE — 36415 COLL VENOUS BLD VENIPUNCTURE: CPT

## 2025-04-29 PROCEDURE — 97530 THERAPEUTIC ACTIVITIES: CPT

## 2025-04-29 PROCEDURE — 6370000000 HC RX 637 (ALT 250 FOR IP): Performed by: HOSPITALIST

## 2025-04-29 PROCEDURE — 6370000000 HC RX 637 (ALT 250 FOR IP): Performed by: INTERNAL MEDICINE

## 2025-04-29 PROCEDURE — 2580000003 HC RX 258: Performed by: INTERNAL MEDICINE

## 2025-04-29 PROCEDURE — 1100000000 HC RM PRIVATE

## 2025-04-29 PROCEDURE — 6360000002 HC RX W HCPCS: Performed by: HOSPITALIST

## 2025-04-29 RX ADMIN — LINEZOLID 600 MG: 600 TABLET, FILM COATED ORAL at 20:40

## 2025-04-29 RX ADMIN — ENOXAPARIN SODIUM 30 MG: 100 INJECTION SUBCUTANEOUS at 09:43

## 2025-04-29 RX ADMIN — Medication 3 MG: at 20:40

## 2025-04-29 RX ADMIN — ENOXAPARIN SODIUM 30 MG: 100 INJECTION SUBCUTANEOUS at 20:39

## 2025-04-29 RX ADMIN — LINEZOLID 600 MG: 600 TABLET, FILM COATED ORAL at 09:43

## 2025-04-29 RX ADMIN — SODIUM CHLORIDE: 0.9 INJECTION, SOLUTION INTRAVENOUS at 15:19

## 2025-04-29 RX ADMIN — FAMOTIDINE 20 MG: 20 TABLET, FILM COATED ORAL at 20:40

## 2025-04-29 RX ADMIN — Medication 1 CAPSULE: at 15:23

## 2025-04-29 RX ADMIN — MEROPENEM 1000 MG: 1 INJECTION INTRAVENOUS at 07:17

## 2025-04-29 RX ADMIN — Medication 1 CAPSULE: at 09:43

## 2025-04-29 RX ADMIN — SODIUM CHLORIDE, PRESERVATIVE FREE 10 ML: 5 INJECTION INTRAVENOUS at 09:00

## 2025-04-29 RX ADMIN — SODIUM CHLORIDE, PRESERVATIVE FREE 10 ML: 5 INJECTION INTRAVENOUS at 20:41

## 2025-04-29 RX ADMIN — MEROPENEM 1000 MG: 1 INJECTION INTRAVENOUS at 15:21

## 2025-04-29 RX ADMIN — ROSUVASTATIN CALCIUM 5 MG: 5 TABLET, FILM COATED ORAL at 20:40

## 2025-04-29 RX ADMIN — FAMOTIDINE 20 MG: 20 TABLET, FILM COATED ORAL at 09:43

## 2025-04-29 RX ADMIN — CHOLECALCIFEROL TAB 125 MCG (5000 UNIT) 5000 UNITS: 125 TAB at 09:43

## 2025-04-29 RX ADMIN — ALLOPURINOL 300 MG: 100 TABLET ORAL at 09:43

## 2025-04-29 ASSESSMENT — PAIN SCALES - GENERAL: PAINLEVEL_OUTOF10: 0

## 2025-04-29 NOTE — PLAN OF CARE
Problem: Discharge Planning  Goal: Discharge to home or other facility with appropriate resources  4/29/2025 0848 by Yanely Castañeda RN  Outcome: Progressing  4/28/2025 2011 by Silvia Garcia RN  Outcome: Progressing  Flowsheets (Taken 4/28/2025 1932)  Discharge to home or other facility with appropriate resources:   Identify barriers to discharge with patient and caregiver   Identify discharge learning needs (meds, wound care, etc)   Refer to discharge planning if patient needs post-hospital services based on physician order or complex needs related to functional status, cognitive ability or social support system     Problem: Safety - Adult  Goal: Free from fall injury  4/29/2025 0848 by Yanely Castañeda RN  Outcome: Progressing  4/28/2025 2011 by Silvia Garcia RN  Outcome: Progressing     Problem: Pain  Goal: Verbalizes/displays adequate comfort level or baseline comfort level  4/29/2025 0848 by Yanely Castañeda RN  Outcome: Progressing  4/28/2025 2011 by Silvia Garcia RN  Outcome: Progressing     Problem: Skin/Tissue Integrity  Goal: Skin integrity remains intact  Description: 1.  Monitor for areas of redness and/or skin breakdown2.  Assess vascular access sites hourly3.  Every 4-6 hours minimum:  Change oxygen saturation probe site4.  Every 4-6 hours:  If on nasal continuous positive airway pressure, respiratory therapy assess nares and determine need for appliance change or resting period  4/29/2025 0848 by Yanely Castañeda RN  Outcome: Progressing  4/28/2025 2011 by Silvia Garcia, RN  Outcome: Progressing  Flowsheets (Taken 4/28/2025 1932)  Skin Integrity Remains Intact: Monitor for areas of redness and/or skin breakdown     Problem: Skin/Tissue Integrity - Adult  Goal: Incisions, wounds, or drain sites healing without S/S of infection  4/29/2025 0848 by Yanely Castañeda RN  Outcome: Progressing  4/28/2025 2011 by Silvia Garcia RN  Outcome: Progressing  Flowsheets (Taken 4/28/2025

## 2025-04-29 NOTE — PROGRESS NOTES
ACUTE PHYSICAL THERAPY GOALS:   (Developed with and agreed upon by patient and/or caregiver.)  LTG:  (1.)Ms. Cho will move from supine to sit and sit to supine , scoot up and down, and roll side to side in bed with CONTACT GUARD ASSIST within 7 treatment day(s).    (2.)Ms. Cho will transfer from bed to chair and chair to bed with CONTACT GUARD ASSIST using the least restrictive device within 7 treatment day(s).    (3.)Ms. Cho will ambulate with CONTACT GUARD ASSIST for 25 feet with the least restrictive device within 7 treatment day(s).    PHYSICAL THERAPY: Daily Note PM   (Link to Caseload Tracking: PT Visit Days : 3  Time In/Out PT Charge Capture  Rehab Caseload Tracker  Orders    Fanny Cho is a 79 y.o. female   PRIMARY DIAGNOSIS: Pressure sore on heel  Pressure sore on heel [L89.609]  Bilateral lower leg cellulitis [L03.116, L03.115]  Pressure injury of skin of heel, unspecified injury stage, unspecified laterality [L89.609]       Inpatient: Payor: [facility] MEDICARE / Plan: [facility] MEDICARE-ADVANTAGE PPO / Product Type: Medicare /     ASSESSMENT:     REHAB RECOMMENDATIONS:   Recommendation to date pending progress:  Setting:  Short-term Rehab    Equipment:    To Be Determined     ASSESSMENT:  Upon arrival, Ms. Cho sitting up in chair, agreeable to therapy. During today's session, therapist facilitated pt in functional mobility including transfers and ambulation. Pt completed x 3 STS transfers with RW and min/ mod A x 2. Pt required increased time to complete and seated rest breaks between each rep due to fatigue/ weakness. Pt ambulated 1 x 10' with RW/ CGA, chair follow provided for safety. Pt making progress towards goals as pt was able to ambulate maximal distance this date. Pt will continue to benefit from skilled acute PT services to progress mobility. Recommend rehab once medically ready for discharge.      SUBJECTIVE:   Ms. Cho states, \"I'm weak\"     Social/Functional Lives With:

## 2025-04-29 NOTE — PROGRESS NOTES
ACUTE OCCUPATIONAL THERAPY GOALS:   (Developed with and agreed upon by patient and/or caregiver.)    1. Pt will toilet with min A using AE as needed   2. Pt will complete functional mobility for ADLs with CGA using AD as needed  3. Pt will complete lower body dressing with min A using AE as needed  4. Pt will complete grooming and hygiene at sink with CGA  5. Pt will demonstrate independence with HEP to promote increased BUE strength and functional use for ADLs  6. Pt will tolerate 23 minutes functional activity with 1-2 rest breaks to promote increased endurance for ADLs        Timeframe: 7 visits  OCCUPATIONAL THERAPY: Daily Note PM   OT Visit Days: 3   Time In/Out  OT Charge Capture  Rehab Caseload Tracker  OT Orders    Fanny Cho is a 79 y.o. female   PRIMARY DIAGNOSIS: Pressure sore on heel  Pressure sore on heel [L89.609]  Bilateral lower leg cellulitis [L03.116, L03.115]  Pressure injury of skin of heel, unspecified injury stage, unspecified laterality [L89.609]       Inpatient: Payor: [employer] MEDICARE / Plan: [facility] MEDIC[facility] PPO / Product Type: Medicare /     ASSESSMENT:     REHAB RECOMMENDATIONS:   Recommendation to date pending progress:  Setting:  Short-term Rehab    Equipment:    To Be Determined     ASSESSMENT:  Ms. Cho continues to present with decreased activity tolerance, LE weakness, arthritis, decreased independence with mobility, and decreased independence with LB self care. Pt completed first sit to stand with mod a x 2 and other sit to stand's with min a x 2. Pt also completed functional mobility in the room with a rolling walker and min a 1-2 for safety. Pt had already completed self care so not addressed this session. Good effort with mobility. Continue POC.       SUBJECTIVE:     Ms. Cho states, \"I need to get my bradley up and moving\"     Social/Functional Lives With: Alone  Type of Home: Assisted living  Home Equipment: Rollator, Wheelchair - Manual  Has the patient

## 2025-04-29 NOTE — PROGRESS NOTES
Hospitalist Progress Note   Admit Date:  2025  1:27 PM   Name:  Fanny Cho   Age:  79 y.o.  Sex:  female  :  1945   MRN:  [id number]   Room:  [facility]/    Presenting/Chief Complaint: Skin Problem and Altered Mental Status     Reason(s) for Admission: Pressure sore on heel [L89.609]  Bilateral lower leg cellulitis [L03.116, L03.115]  Pressure injury of skin of heel, unspecified injury stage, unspecified laterality [L89.609]     Hospital Course:   Fanny Cho is a 79 y.o. female with medical history of  obesity BMI 38.7, HTN, dyslipidemia, gout, debility, venous stasis of B/L LE brought in from [facility] in view of bilateral foot pain and heel ulcers. Patient lives in assisted living at the [facility] and was brought in by the daughter with concern patient for confusion as well as discomfort in her bilateral heels.      In the emergency room, Bilateral x-ray of her feet show advanced degenerative changes in both feet, flexion deformities in the DIP of multiple left toes, soft tissue swelling predominantly on the left.  Laboratory workup was fairly unremarkable except for albumin of 2.3.     Patient was admitted with pressure sore on heel with history of venous stasis dermatitis to both lower extremities.  Lower extremity Doppler was without any signs of DVT. She has bilateral lower extremity chronic wounds.  Superficial cultures growing Pseudomonas, Enterococcus and MRSA.  She was started on broad-spectrum IV antibiotics.  ID was consulted and recommended no more than 5 days total of antibiotics and continue wound care. She completed 3 days of IV Zosyn and 2 days of Meropenem while in patient; completed 3 days of IV Linezolid and was switched to po to finish 5d course total EOT .     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  Orthopedics was consulted and patient did not want any intervention to right hip.   Platelets 144 (L) 150 - 450 K/uL    MPV 10.8 9.4 - 12.3 FL    nRBC 0.00 0.0 - 0.2 K/uL    Differential Type AUTOMATED      Neutrophils % 55.5 43.0 - 78.0 %    Lymphocytes % 25.2 13.0 - 44.0 %    Monocytes % 11.4 4.0 - 12.0 %    Eosinophils % 6.5 0.5 - 7.8 %    Basophils % 1.1 0.0 - 2.0 %    Immature Granulocytes % 0.3 0.0 - 5.0 %    Neutrophils Absolute 3.57 1.70 - 8.20 K/UL    Lymphocytes Absolute 1.62 0.50 - 4.60 K/UL    Monocytes Absolute 0.73 0.10 - 1.30 K/UL    Eosinophils Absolute 0.42 0.00 - 0.80 K/UL    Basophils Absolute 0.07 0.00 - 0.20 K/UL    Immature Granulocytes Absolute 0.02 0.0 - 0.5 K/UL   Basic Metabolic Panel w/ Reflex to MG    Collection Time: 04/28/25  5:17 AM   Result Value Ref Range    Sodium 136 136 - 145 mmol/L    Potassium 4.0 3.5 - 5.1 mmol/L    Chloride 103 98 - 107 mmol/L    CO2 22 20 - 29 mmol/L    Anion Gap 11 7 - 16 mmol/L    Glucose 89 70 - 99 mg/dL    BUN 16 8 - 23 MG/DL    Creatinine 0.87 0.60 - 1.10 MG/DL    Est, Glom Filt Rate 68 >60 ml/min/1.73m2    Calcium 8.7 (L) 8.8 - 10.2 MG/DL   CBC with Auto Differential    Collection Time: 04/29/25  4:51 AM   Result Value Ref Range    WBC 5.9 4.3 - 11.1 K/uL    RBC 3.32 (L) 4.05 - 5.2 M/uL    Hemoglobin 9.2 (L) 11.7 - 15.4 g/dL    Hematocrit 29.1 (L) 35.8 - 46.3 %    MCV 87.7 82 - 102 FL    MCH 27.7 26.1 - 32.9 PG    MCHC 31.6 31.4 - 35.0 g/dL    RDW 17.2 (H) 11.9 - 14.6 %    Platelets 172 150 - 450 K/uL    MPV 9.9 9.4 - 12.3 FL    nRBC 0.00 0.0 - 0.2 K/uL    Differential Type AUTOMATED      Neutrophils % 54.5 43.0 - 78.0 %    Lymphocytes % 27.4 13.0 - 44.0 %    Monocytes % 10.7 4.0 - 12.0 %    Eosinophils % 6.1 0.5 - 7.8 %    Basophils % 1.0 0.0 - 2.0 %    Immature Granulocytes % 0.3 0.0 - 5.0 %    Neutrophils Absolute 3.19 1.70 - 8.20 K/UL    Lymphocytes Absolute 1.61 0.50 - 4.60 K/UL    Monocytes Absolute 0.63 0.10 - 1.30 K/UL    Eosinophils Absolute 0.36 0.00 - 0.80 K/UL    Basophils Absolute 0.06 0.00 - 0.20 K/UL    Immature

## 2025-04-29 NOTE — CARE COORDINATION
Pt will complete IVAB's this afternoon, she will be ready for STR tmrw, to [facility] submitted auth to [employer] and pending, [facility] will continue to follow.

## 2025-04-30 PROCEDURE — 97530 THERAPEUTIC ACTIVITIES: CPT

## 2025-04-30 PROCEDURE — 29580 STRAPPING UNNA BOOT: CPT

## 2025-04-30 PROCEDURE — 6370000000 HC RX 637 (ALT 250 FOR IP): Performed by: INTERNAL MEDICINE

## 2025-04-30 PROCEDURE — 97112 NEUROMUSCULAR REEDUCATION: CPT

## 2025-04-30 PROCEDURE — 97535 SELF CARE MNGMENT TRAINING: CPT

## 2025-04-30 PROCEDURE — 6370000000 HC RX 637 (ALT 250 FOR IP): Performed by: HOSPITALIST

## 2025-04-30 PROCEDURE — 2500000003 HC RX 250 WO HCPCS: Performed by: HOSPITALIST

## 2025-04-30 PROCEDURE — 6360000002 HC RX W HCPCS: Performed by: HOSPITALIST

## 2025-04-30 PROCEDURE — 1100000000 HC RM PRIVATE

## 2025-04-30 RX ADMIN — CHOLECALCIFEROL TAB 125 MCG (5000 UNIT) 5000 UNITS: 125 TAB at 09:05

## 2025-04-30 RX ADMIN — TRAMADOL HYDROCHLORIDE 50 MG: 50 TABLET, COATED ORAL at 09:04

## 2025-04-30 RX ADMIN — ENOXAPARIN SODIUM 30 MG: 100 INJECTION SUBCUTANEOUS at 09:06

## 2025-04-30 RX ADMIN — Medication 1 CAPSULE: at 17:25

## 2025-04-30 RX ADMIN — SODIUM CHLORIDE, PRESERVATIVE FREE 10 ML: 5 INJECTION INTRAVENOUS at 09:06

## 2025-04-30 RX ADMIN — ALLOPURINOL 300 MG: 100 TABLET ORAL at 09:05

## 2025-04-30 RX ADMIN — ROSUVASTATIN CALCIUM 5 MG: 5 TABLET, FILM COATED ORAL at 21:02

## 2025-04-30 RX ADMIN — FAMOTIDINE 20 MG: 20 TABLET, FILM COATED ORAL at 21:02

## 2025-04-30 RX ADMIN — FAMOTIDINE 20 MG: 20 TABLET, FILM COATED ORAL at 09:05

## 2025-04-30 RX ADMIN — LINEZOLID 600 MG: 600 TABLET, FILM COATED ORAL at 09:05

## 2025-04-30 RX ADMIN — LOPERAMIDE HYDROCHLORIDE 2 MG: 2 CAPSULE ORAL at 21:03

## 2025-04-30 RX ADMIN — Medication 3 MG: at 21:03

## 2025-04-30 RX ADMIN — LINEZOLID 600 MG: 600 TABLET, FILM COATED ORAL at 21:02

## 2025-04-30 RX ADMIN — ENOXAPARIN SODIUM 30 MG: 100 INJECTION SUBCUTANEOUS at 21:02

## 2025-04-30 RX ADMIN — SODIUM CHLORIDE, PRESERVATIVE FREE 10 ML: 5 INJECTION INTRAVENOUS at 21:06

## 2025-04-30 RX ADMIN — Medication 1 CAPSULE: at 09:05

## 2025-04-30 ASSESSMENT — PAIN DESCRIPTION - ONSET: ONSET: ON-GOING

## 2025-04-30 ASSESSMENT — PAIN DESCRIPTION - PAIN TYPE: TYPE: CHRONIC PAIN

## 2025-04-30 ASSESSMENT — PAIN SCALES - GENERAL
PAINLEVEL_OUTOF10: 4
PAINLEVEL_OUTOF10: 4
PAINLEVEL_OUTOF10: 2

## 2025-04-30 ASSESSMENT — PAIN DESCRIPTION - LOCATION
LOCATION: BACK

## 2025-04-30 ASSESSMENT — PAIN DESCRIPTION - FREQUENCY: FREQUENCY: INTERMITTENT

## 2025-04-30 ASSESSMENT — PAIN DESCRIPTION - DESCRIPTORS
DESCRIPTORS: ACHING
DESCRIPTORS: ACHING

## 2025-04-30 NOTE — PROGRESS NOTES
Hospitalist Progress Note   Admit Date:  2025  1:27 PM   Name:  Fanny Cho   Age:  79 y.o.  Sex:  female  :  1945   MRN:  [id number]   Room:  [facility]/    Presenting/Chief Complaint: Skin Problem and Altered Mental Status     Reason(s) for Admission: Pressure sore on heel [L89.609]  Bilateral lower leg cellulitis [L03.116, L03.115]  Pressure injury of skin of heel, unspecified injury stage, unspecified laterality [L89.609]     Hospital Course:   Fanny Cho is a 79 y.o. female with medical history of  obesity BMI 38.7, HTN, dyslipidemia, gout, debility, venous stasis of B/L LE brought in from [facility] in view of bilateral foot pain and heel ulcers. Patient lives in assisted living at the [facility] and was brought in by the daughter with concern patient for confusion as well as discomfort in her bilateral heels.      In the emergency room, Bilateral x-ray of her feet show advanced degenerative changes in both feet, flexion deformities in the DIP of multiple left toes, soft tissue swelling predominantly on the left.  Laboratory workup was fairly unremarkable except for albumin of 2.3.     Patient was admitted with pressure sore on heel with history of venous stasis dermatitis to both lower extremities.  Lower extremity Doppler was without any signs of DVT. She has bilateral lower extremity chronic wounds.  Superficial cultures growing Pseudomonas, Enterococcus and MRSA.  She was started on broad-spectrum IV antibiotics.  ID was consulted and recommended no more than 5 days total of antibiotics and continue wound care. She completed 3 days of IV Zosyn and 2 days of Meropenem while in patient; completed 3 days of IV Linezolid and was switched to po to finish 5d course total EOT .     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  Orthopedics was consulted and patient did not want any intervention to right hip.       Subjective & 24hr Events:     Patient alert and orient x 3.  Sitting up in recliner.  Denies any pain currently.  Eager to go to rehab.  No fever or chills or SOB.    Assessment & Plan:     Pressure sore on heel  Venous stasis dermatitis of both lower extremities  Infection due to MRSA, Pseudomonas and VRE  - Wound Care has been consulted.  - Lower extremity Doppler without any signs of DVT.  - Blood culture without any growth.  Wound culture with MRSA, Pseudomonas and VRE.    - Infectious disease recs appreciated.  Recommending no more than 5 days of linezolid (started on admission) and IV meropenem(which was started 4/27). EOT for both 4/29  - Antibiotics completed 4/29    Right Hip pain due to severe DJD  Hip Xry with severe djd with concern for avascular necrosis  - Orthopedics has seen, pt does not want any intervention to R hip.     Gout: on allopurinol.  Mixed hyperlipidemia: on crestor.    Obesity  Body mass index is 38.2 kg/m².  Increased risk of all cause mortality, complicating care    Essential hypertension  BP optimally controlled. Not on any antihypertensives.     Hypoalbuminemia: ordered for protein supplementation with each meal.    Anticipated Discharge Arrangements:   SNF pending at this time    PT/OT evals ordered?  Therapy evals ordered  Diet:  ADULT DIET; Regular; No Added Salt (3-4 gm)  ADULT ORAL NUTRITION SUPPLEMENT; Breakfast, Lunch, Dinner; Standard High Calorie/High Protein Oral Supplement  VTE prophylaxis: Lovenox  Code status: Full Code      Non-peripheral Lines and Tubes (if present):      External Urinary Catheter (Active)        Telemetry (if present):           Hospital Problems:  Principal Problem:    Pressure sore on heel  Active Problems:    Venous stasis dermatitis of both lower extremities    Gout    Mixed hyperlipidemia    Obesity, morbid (HCC)    Essential hypertension    Hypoalbuminemia    Right hip pain  Resolved Problems:    * No resolved hospital problems.  (Peripheral Line)  10 mEq IntraVENous PRN    magnesium sulfate 2000 mg in 50 mL IVPB premix  2,000 mg IntraVENous PRN    enoxaparin Sodium (LOVENOX) injection 30 mg  30 mg SubCUTAneous BID    polyethylene glycol (GLYCOLAX) packet 17 g  17 g Oral Daily PRN    acetaminophen (TYLENOL) tablet 650 mg  650 mg Oral Q6H PRN    Or    acetaminophen (TYLENOL) suppository 650 mg  650 mg Rectal Q6H PRN    melatonin tablet 3 mg  3 mg Oral Nightly    sennosides-docusate sodium (SENOKOT-S) 8.6-50 MG tablet 1 tablet  1 tablet Oral BID    promethazine (PHENERGAN) tablet 25 mg  25 mg Oral Q6H PRN    Or    ondansetron (ZOFRAN) injection 4 mg  4 mg IntraVENous Q4H PRN    loperamide (IMODIUM) capsule 2 mg  2 mg Oral 4x Daily PRN    lactobacillus (CULTURELLE) capsule 1 capsule  1 capsule Oral BID        Signed:  Gladis Odonnell DO    Part of this note may have been written by using a voice dictation software.  The note has been proof read but may still contain some grammatical/other typographical errors.

## 2025-04-30 NOTE — PROGRESS NOTES
ACUTE PHYSICAL THERAPY GOALS:   (Developed with and agreed upon by patient and/or caregiver.)  LTG:  (1.)Ms. Cho will move from supine to sit and sit to supine , scoot up and down, and roll side to side in bed with CONTACT GUARD ASSIST within 7 treatment day(s).    (2.)Ms. Cho will transfer from bed to chair and chair to bed with CONTACT GUARD ASSIST using the least restrictive device within 7 treatment day(s).    (3.)Ms. Cho will ambulate with CONTACT GUARD ASSIST for 25 feet with the least restrictive device within 7 treatment day(s).    PHYSICAL THERAPY: Daily Note AM   (Link to Caseload Tracking: PT Visit Days : 4  Time In/Out PT Charge Capture  Rehab Caseload Tracker  Orders    Fanny Cho is a 79 y.o. female   PRIMARY DIAGNOSIS: Pressure sore on heel  Pressure sore on heel [L89.609]  Bilateral lower leg cellulitis [L03.116, L03.115]  Pressure injury of skin of heel, unspecified injury stage, unspecified laterality [L89.609]       Inpatient: Payor: [facility] MEDICARE / Plan: [facility] MEDICARE-ADVANTAGE PPO / Product Type: Medicare /     ASSESSMENT:     REHAB RECOMMENDATIONS:   Recommendation to date pending progress:  Setting:  Short-term Rehab    Equipment:    To Be Determined     ASSESSMENT:  Upon arrival, Ms. Cho is sitting in the chair.  She is agreeable to participate.  She continues to be weak.  All activity take extra time but she gives great effort.  Sit to stands got better each time she did it going from mod of 2 ot min of 2.  She urinated each time she stood up.  She was soaking wet and needed changing of her brief.  Also needed bsc transfer.   Transfer to bs required min of 1 with RW.  She also was able to ambulate 30 ft to the door.  This is great progress as she walked much farther than yesterday.  Ms. Cho continues to be function well below baseline and is therefore appropriate for continued PT services.  Awaiting rehab.       SUBJECTIVE:   Ms. Cho states, \"Do you  think I will ever be able to care for myself again'\"    Social/Functional Lives With: Alone  Type of Home: Assisted living  Home Equipment: Rollator, Wheelchair - Manual  Has the patient had two or more falls in the past year or any fall with injury in the past year?: Yes  Prior Level of Assist for Ambulation: Independent household ambulator, with or without device  Prior Level of Assist for Transfers: Independent  Additional Comments: Pt resides at [facility], is normally independent with ADLs (minus socks and shoes), uses a rollator in her room and WC out.  OBJECTIVE:     PAIN: VITALS / O2: PRECAUTION / LINES / DRAINS:   Pre Treatment:    No pain reported      Post Treatment: No pain reported Vitals        Oxygen         RESTRICTIONS/PRECAUTIONS:  Restrictions/Precautions  Restrictions/Precautions: Fall Risk  Restrictions/Precautions: Fall Risk     MOBILITY:  I Mod I S SBA CGA Min Mod Max Total  NT x2 Comments:   Bed Mobility    Rolling [] [] [] [] [] [] [] [] [] [x] []    Supine to Sit [] [] [] [] [] [] [] [] [] [x] []    Scooting [] [] [] [] [] [] [] [] [] [x] []    Sit to Supine [] [] [] [] [] [] [] [] [] [x] []    Transfers    Sit to Stand [] [] [] [] [] [x] [x] [] [] [] [x] X 4 with RW, anterior lean.  Extra time   Bed to Chair [] [] [] [] [] [] [] [] [] [x] []    Stand to Sit [] [] [] [] [] [x] [] [] [] [] [x]     [] [] [] [] [] [] [] [] [] [] []    I=Independent, Mod I=Modified Independent, S=Supervision, SBA=Standby Assistance, CGA=Contact Guard Assistance,   Min=Minimal Assistance, Mod=Moderate Assistance, Max=Maximal Assistance, Total=Total Assistance, NT=Not Tested    BALANCE: Good Fair+ Fair Fair- Poor NT Comments   Sitting Static [x] [] [] [] [] []    Sitting Dynamic [] [] [x] [] [] []              Standing Static [] [] [] [x] [] []    Standing Dynamic [] [] [] [x] [] []      GAIT: I Mod I S SBA CGA Min Mod Max Total  NT x2 Comments:   Level of Assistance [] [] [] [] [x] [] [] [] [] [] [] Distance  limited due to fatigue/ LE weakness   Distance 30  feet    DME Gait Belt and Rolling Walker    Gait Quality Decreased starr , Decreased step clearance, Decreased step length, and Wide base of support    Weightbearing Status      Stairs      I=Independent, Mod I=Modified Independent, S=Supervision, SBA=Standby Assistance, CGA=Contact Guard Assistance,   Min=Minimal Assistance, Mod=Moderate Assistance, Max=Maximal Assistance, Total=Total Assistance, NT=Not Tested    PLAN:   FREQUENCY AND DURATION: 3 times/week for duration of hospital stay or until stated goals are met, whichever comes first.    TREATMENT:   TREATMENT:   Co-Treatment PT/OT necessary due to patient's decreased overall endurance/tolerance levels, as well as need for high level skilled assistance to complete functional transfers/mobility and functional tasks  Therapeutic Activity (55 Minutes): Therapeutic activity included Transfer Training, Ambulation on level ground, Sitting balance , and Standing balance to improve functional Activity tolerance, Balance, Mobility, and Strength.    TREATMENT GRID:  N/A    AFTER TREATMENT PRECAUTIONS: Alarm Activated, Bed/Chair Locked, Call light within reach, Chair, and Needs within reach    INTERDISCIPLINARY COLLABORATION:  RN/ PCT and OT/ [facility]    EDUCATION:    Educated patient and/or family/caregiver on the following: Assistive device indications/utilization/safety, Fall prevention strategies, and discharge recommendations    TIME IN/OUT:  Time In: 1101  Time Out: 1156  Minutes: 55    TERA CHAUDHARI PT

## 2025-04-30 NOTE — PLAN OF CARE
Problem: Discharge Planning  Goal: Discharge to home or other facility with appropriate resources  4/30/2025 1015 by Kaye Garcia RN  Flowsheets (Taken 4/29/2025 1948 by Silvia Garcia RN)  Discharge to home or other facility with appropriate resources:   Identify barriers to discharge with patient and caregiver   Identify discharge learning needs (meds, wound care, etc)   Refer to discharge planning if patient needs post-hospital services based on physician order or complex needs related to functional status, cognitive ability or social support system  4/29/2025 2028 by Silvia Garcia RN  Outcome: Progressing  Flowsheets (Taken 4/29/2025 1948)  Discharge to home or other facility with appropriate resources:   Identify barriers to discharge with patient and caregiver   Identify discharge learning needs (meds, wound care, etc)   Refer to discharge planning if patient needs post-hospital services based on physician order or complex needs related to functional status, cognitive ability or social support system     Problem: Safety - Adult  Goal: Free from fall injury  4/30/2025 1015 by Kaye Garcia RN  Flowsheets (Taken 4/27/2025 1906 by Antonia Fernández, RN)  Free From Fall Injury: Instruct family/caregiver on patient safety  4/29/2025 2028 by Silvia Garcia RN  Outcome: Progressing     Problem: Pain  Goal: Verbalizes/displays adequate comfort level or baseline comfort level  4/30/2025 1015 by Kaye Garcia RN  Flowsheets (Taken 4/27/2025 1942 by Antonia Fernández, RN)  Verbalizes/displays adequate comfort level or baseline comfort level: Encourage patient to monitor pain and request assistance  4/29/2025 2028 by Silvia Garcia RN  Outcome: Progressing     Problem: Skin/Tissue Integrity  Goal: Skin integrity remains intact  Description: 1.  Monitor for areas of redness and/or skin breakdown2.  Assess vascular access sites hourly3.  Every 4-6 hours minimum:  Change oxygen saturation probe site4.   Every 4-6 hours:  If on nasal continuous positive airway pressure, respiratory therapy assess nares and determine need for appliance change or resting period  4/29/2025 2028 by Silvia Garcia RN  Outcome: Progressing  Flowsheets (Taken 4/29/2025 1948)  Skin Integrity Remains Intact: Monitor for areas of redness and/or skin breakdown     Problem: Skin/Tissue Integrity - Adult  Goal: Incisions, wounds, or drain sites healing without S/S of infection  4/30/2025 1015 by Kaye Garcia RN  Flowsheets (Taken 4/29/2025 1948 by Silvia Garcia, RN)  Incisions, Wounds, or Drain Sites Healing Without Sign and Symptoms of Infection: ADMISSION and DAILY: Assess and document risk factors for pressure ulcer development  4/29/2025 2028 by Silvia Garcia RN  Outcome: Progressing  Flowsheets (Taken 4/29/2025 1948)  Incisions, Wounds, or Drain Sites Healing Without Sign and Symptoms of Infection: ADMISSION and DAILY: Assess and document risk factors for pressure ulcer development     Problem: Musculoskeletal - Adult  Goal: Return mobility to safest level of function  4/30/2025 1015 by Kaye Garcia RN  Flowsheets (Taken 4/28/2025 1932 by Silvia Garcia, RN)  Return Mobility to Safest Level of Function: Assess patient stability and activity tolerance for standing, transferring and ambulating with or without assistive devices  4/29/2025 2028 by Silvia Garcia RN  Outcome: Progressing  Goal: Maintain proper alignment of affected body part  4/30/2025 1015 by Kaye Garcia RN  Flowsheets (Taken 4/28/2025 1932 by Silvia Garcia, RN)  Maintain proper alignment of affected body part: Support and protect limb and body alignment per provider's orders  4/29/2025 2028 by Silvia Garcia RN  Outcome: Progressing     Problem: Metabolic/Fluid and Electrolytes - Adult  Goal: Electrolytes maintained within normal limits  4/30/2025 1015 by Kaye Garcia RN  Flowsheets (Taken 4/29/2025 1948 by Silvia Garcia

## 2025-04-30 NOTE — PROGRESS NOTES
ACUTE OCCUPATIONAL THERAPY GOALS:   (Developed with and agreed upon by patient and/or caregiver.)    1. Pt will toilet with min A using AE as needed   2. Pt will complete functional mobility for ADLs with CGA using AD as needed  3. Pt will complete lower body dressing with min A using AE as needed  4. Pt will complete grooming and hygiene at sink with CGA  5. Pt will demonstrate independence with HEP to promote increased BUE strength and functional use for ADLs  6. Pt will tolerate 23 minutes functional activity with 1-2 rest breaks to promote increased endurance for ADLs      Timeframe: 7 visits  OCCUPATIONAL THERAPY: Daily Note AM   OT Visit Days: 4   Time In/Out  OT Charge Capture  Rehab Caseload Tracker  OT Orders    Fanny Cho is a 79 y.o. female   PRIMARY DIAGNOSIS: Pressure sore on heel  Pressure sore on heel [L89.609]  Bilateral lower leg cellulitis [L03.116, L03.115]  Pressure injury of skin of heel, unspecified injury stage, unspecified laterality [L89.609]       Inpatient: Payor: [employer] MEDICARE / Plan: [facility] MEDICARE-ADVANTAGE PPO / Product Type: Medicare /     ASSESSMENT:     REHAB RECOMMENDATIONS:   Recommendation to date pending progress:  Setting:  Short-term Rehab    Equipment:    To Be Determined     ASSESSMENT:  Ms. Cho presents with decreased activity tolerance, LE weakness, arthritis, B LE wounds, decreased independence with mobility, and decreased independence with LB self care and toileting. Pt completd shampoo cap and combing hair with setup only, then completed first sit to stand with mod a x 2 and other sit to stand's with min a x 2 using gait belt and cues to lean forward and push from seated surface with B arms. Unable to internally rotate B feet so that toes pointed forward.  Each time she stood she voided with purewick but it failed, so linens, pad and brief changed out. Dep pull up brief and replaced purewick after ambulation. Pt also completed recliner to bedside      TREATMENT:   Co-Treatment between OT & PT necessary due to patient's decreased overall endurance/tolerance levels, as well as need for high level skilled assistance to complete functional transfers/mobility and functional tasks  Neuromuscular Re-education (10 Minutes): Patient participated in neuromuscular re-education including functional reaching, weight shifting, postural training, visual scanning activity, midline training, fine motor skills training, standing tolerance activity , and sitting balance activity   with minimal, moderate, and need for assistance, verbal cues, visual cues, education, and adaptive equipment to improve sitting balance, standing balance, proprioception, fine motor skills, posture, coordination, static balance, and dynamic balance in order to prepare for functional task, prepare for seated ADLs, prepare for standing ADLs, prepare for functional transfer, increase safety awareness, and prepare for self care..   Self Care (46 minutes): Patient participated in upper body bathing, lower body bathing, toileting, upper body dressing, lower body dressing, self feeding, grooming, functional mobility, functional transfer, energy conservation, adaptive equipment, assistive device, and compensatory technique in supported sitting and standing with moderate visual, verbal, manual, and tactile cueing to increase independence, decrease assistance required, increase activity tolerance, and increase safety awareness. The patient was educated on role of occupational therapy, compensatory technique during ADL , strategies to improve safety, proper use of assistive device, recommended equipment, transfer training and safety, and energy conservation techniques during ADL/IADLS and patient verbalized understanding and will need reinforcement at next session.     TREATMENT GRID:  N/A    AFTER TREATMENT PRECAUTIONS: Alarm Activated, Bed/Chair Locked, Call light within reach, Chair, Heels floated, Needs

## 2025-04-30 NOTE — WOUND CARE
BLE Unna boot compression wraps changed. Pt w/ hopeful DC tomorrow back to [place] [facility]. Overall improvement in BLE wounds w/ new epithelialization and decreased slough.    LLE/lateral ankle; 6x2x0.2cm, pink/red, slough, subcutaneous, epithelialization, small serosanguinous, no odor.        RLE/lateral ankle 4x1x0.2cm, pink/red, slough, subcutaneous, epithelialization, scant serosanguinous, no odor. Callous was removed w/ cleaning.

## 2025-04-30 NOTE — PROGRESS NOTES
Pt in chair attempted to get her to bed but patient preferred chair.  Worked with PT this shift and is making progress toward adl goals.  Wound care to change bandages on heels.  Diet downgraded to soft and bite size due to dentition. Hourly rounds completed.  Bed locked and lowered into lowest position.  Call light and personal items within reach.  Will give report to oncoming nurse.

## 2025-05-01 PROCEDURE — 6370000000 HC RX 637 (ALT 250 FOR IP): Performed by: HOSPITALIST

## 2025-05-01 PROCEDURE — 6370000000 HC RX 637 (ALT 250 FOR IP): Performed by: INTERNAL MEDICINE

## 2025-05-01 PROCEDURE — 6360000002 HC RX W HCPCS: Performed by: HOSPITALIST

## 2025-05-01 PROCEDURE — 1100000000 HC RM PRIVATE

## 2025-05-01 RX ADMIN — Medication 3 MG: at 20:54

## 2025-05-01 RX ADMIN — ROSUVASTATIN CALCIUM 5 MG: 5 TABLET, FILM COATED ORAL at 20:54

## 2025-05-01 RX ADMIN — Medication 1 CAPSULE: at 09:42

## 2025-05-01 RX ADMIN — ENOXAPARIN SODIUM 30 MG: 100 INJECTION SUBCUTANEOUS at 20:55

## 2025-05-01 RX ADMIN — Medication 1 CAPSULE: at 17:31

## 2025-05-01 RX ADMIN — LINEZOLID 600 MG: 600 TABLET, FILM COATED ORAL at 09:42

## 2025-05-01 RX ADMIN — ENOXAPARIN SODIUM 30 MG: 100 INJECTION SUBCUTANEOUS at 09:41

## 2025-05-01 RX ADMIN — CHOLECALCIFEROL TAB 125 MCG (5000 UNIT) 5000 UNITS: 125 TAB at 09:42

## 2025-05-01 RX ADMIN — FAMOTIDINE 20 MG: 20 TABLET, FILM COATED ORAL at 09:42

## 2025-05-01 RX ADMIN — ALLOPURINOL 300 MG: 100 TABLET ORAL at 09:42

## 2025-05-01 RX ADMIN — LINEZOLID 600 MG: 600 TABLET, FILM COATED ORAL at 20:54

## 2025-05-01 RX ADMIN — FAMOTIDINE 20 MG: 20 TABLET, FILM COATED ORAL at 20:54

## 2025-05-01 ASSESSMENT — PAIN SCALES - GENERAL
PAINLEVEL_OUTOF10: 0
PAINLEVEL_OUTOF10: 0

## 2025-05-01 NOTE — PLAN OF CARE
Problem: Discharge Planning  Goal: Discharge to home or other facility with appropriate resources  Outcome: Progressing  Flowsheets (Taken 4/30/2025 1918)  Discharge to home or other facility with appropriate resources:   Identify barriers to discharge with patient and caregiver   Identify discharge learning needs (meds, wound care, etc)   Refer to discharge planning if patient needs post-hospital services based on physician order or complex needs related to functional status, cognitive ability or social support system     Problem: Safety - Adult  Goal: Free from fall injury  Outcome: Progressing     Problem: Pain  Goal: Verbalizes/displays adequate comfort level or baseline comfort level  Outcome: Progressing

## 2025-05-01 NOTE — PROGRESS NOTES
Hospitalist Progress Note   Admit Date:  2025  1:27 PM   Name:  Fanny Cho   Age:  79 y.o.  Sex:  female  :  1945   MRN:  [id number]   Room:  [facility]/    Presenting/Chief Complaint: Skin Problem and Altered Mental Status     Reason(s) for Admission: Pressure sore on heel [L89.609]  Bilateral lower leg cellulitis [L03.116, L03.115]  Pressure injury of skin of heel, unspecified injury stage, unspecified laterality [L89.609]     Hospital Course:   Fanny Cho is a 79 y.o. female with medical history of  obesity BMI 38.7, HTN, dyslipidemia, gout, debility, venous stasis of B/L LE brought in from [facility] in view of bilateral foot pain and heel ulcers. Patient lives in assisted living at the [facility] and was brought in by the daughter with concern patient for confusion as well as discomfort in her bilateral heels.      In the emergency room, Bilateral x-ray of her feet show advanced degenerative changes in both feet, flexion deformities in the DIP of multiple left toes, soft tissue swelling predominantly on the left.  Laboratory workup was fairly unremarkable except for albumin of 2.3.     Patient was admitted with pressure sore on heel with history of venous stasis dermatitis to both lower extremities.  Lower extremity Doppler was without any signs of DVT. She has bilateral lower extremity chronic wounds.  Superficial cultures growing Pseudomonas, Enterococcus and MRSA.  She was started on broad-spectrum IV antibiotics.  ID was consulted and recommended no more than 5 days total of antibiotics and continue wound care. She completed 3 days of IV Zosyn and 2 days of Meropenem while in patient; completed 3 days of IV Linezolid and was switched to po to finish 5d course total EOT .     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  Orthopedics was consulted and patient did not want any intervention to right hip.   mg  5 mg Oral Nightly    allopurinol (ZYLOPRIM) tablet 300 mg  300 mg Oral Daily    vitamin D3 (CHOLECALCIFEROL) tablet 5,000 Units  5,000 Units Oral Daily    sodium chloride flush 0.9 % injection 5-40 mL  5-40 mL IntraVENous 2 times per day    sodium chloride flush 0.9 % injection 5-40 mL  5-40 mL IntraVENous PRN    0.9 % sodium chloride infusion   IntraVENous PRN    potassium chloride (KLOR-CON M) extended release tablet 40 mEq  40 mEq Oral PRN    Or    potassium bicarb-citric acid (EFFER-K) effervescent tablet 40 mEq  40 mEq Oral PRN    Or    potassium chloride 10 mEq/100 mL IVPB (Peripheral Line)  10 mEq IntraVENous PRN    magnesium sulfate 2000 mg in 50 mL IVPB premix  2,000 mg IntraVENous PRN    enoxaparin Sodium (LOVENOX) injection 30 mg  30 mg SubCUTAneous BID    polyethylene glycol (GLYCOLAX) packet 17 g  17 g Oral Daily PRN    acetaminophen (TYLENOL) tablet 650 mg  650 mg Oral Q6H PRN    Or    acetaminophen (TYLENOL) suppository 650 mg  650 mg Rectal Q6H PRN    melatonin tablet 3 mg  3 mg Oral Nightly    sennosides-docusate sodium (SENOKOT-S) 8.6-50 MG tablet 1 tablet  1 tablet Oral BID    promethazine (PHENERGAN) tablet 25 mg  25 mg Oral Q6H PRN    Or    ondansetron (ZOFRAN) injection 4 mg  4 mg IntraVENous Q4H PRN    loperamide (IMODIUM) capsule 2 mg  2 mg Oral 4x Daily PRN    lactobacillus (CULTURELLE) capsule 1 capsule  1 capsule Oral BID        Signed:  Gladis Odonnell DO    Part of this note may have been written by using a voice dictation software.  The note has been proof read but may still contain some grammatical/other typographical errors.

## 2025-05-01 NOTE — PLAN OF CARE
Problem: Discharge Planning  Goal: Discharge to home or other facility with appropriate resources  Outcome: Progressing  Flowsheets (Taken 4/30/2025 1918 by Silvia Garcia, RN)  Discharge to home or other facility with appropriate resources:   Identify barriers to discharge with patient and caregiver   Identify discharge learning needs (meds, wound care, etc)   Refer to discharge planning if patient needs post-hospital services based on physician order or complex needs related to functional status, cognitive ability or social support system     Problem: Safety - Adult  Goal: Free from fall injury  Outcome: Progressing  Flowsheets (Taken 4/27/2025 1906 by Antonia Fernández, RN)  Free From Fall Injury: Instruct family/caregiver on patient safety     Problem: Pain  Goal: Verbalizes/displays adequate comfort level or baseline comfort level  Outcome: Progressing  Flowsheets (Taken 4/27/2025 1942 by Antonia Fernández, RN)  Verbalizes/displays adequate comfort level or baseline comfort level: Encourage patient to monitor pain and request assistance     Problem: Skin/Tissue Integrity - Adult  Goal: Incisions, wounds, or drain sites healing without S/S of infection  Outcome: Progressing  Flowsheets (Taken 4/30/2025 1918 by Silvia Garcia, RN)  Incisions, Wounds, or Drain Sites Healing Without Sign and Symptoms of Infection: ADMISSION and DAILY: Assess and document risk factors for pressure ulcer development     Problem: Musculoskeletal - Adult  Goal: Return mobility to safest level of function  Outcome: Progressing  Flowsheets (Taken 4/30/2025 1918 by Silvia Garcia, RN)  Return Mobility to Safest Level of Function: Assess patient stability and activity tolerance for standing, transferring and ambulating with or without assistive devices  Goal: Maintain proper alignment of affected body part  Outcome: Progressing  Flowsheets (Taken 4/30/2025 1918 by Silvia Garcia, RN)  Maintain proper alignment of affected body  part: Support and protect limb and body alignment per provider's orders

## 2025-05-02 VITALS
SYSTOLIC BLOOD PRESSURE: 152 MMHG | DIASTOLIC BLOOD PRESSURE: 65 MMHG | TEMPERATURE: 97.7 F | BODY MASS INDEX: 38.01 KG/M2 | RESPIRATION RATE: 16 BRPM | HEART RATE: 58 BPM | WEIGHT: 222.66 LBS | HEIGHT: 64 IN | OXYGEN SATURATION: 96 %

## 2025-05-02 LAB
SARS-COV-2 RDRP RESP QL NAA+PROBE: NOT DETECTED
SOURCE: NORMAL

## 2025-05-02 PROCEDURE — 6370000000 HC RX 637 (ALT 250 FOR IP): Performed by: INTERNAL MEDICINE

## 2025-05-02 PROCEDURE — 6360000002 HC RX W HCPCS: Performed by: HOSPITALIST

## 2025-05-02 PROCEDURE — 87635 SARS-COV-2 COVID-19 AMP PRB: CPT

## 2025-05-02 PROCEDURE — 29580 STRAPPING UNNA BOOT: CPT

## 2025-05-02 PROCEDURE — 6370000000 HC RX 637 (ALT 250 FOR IP): Performed by: HOSPITALIST

## 2025-05-02 RX ORDER — SENNA AND DOCUSATE SODIUM 50; 8.6 MG/1; MG/1
1 TABLET, FILM COATED ORAL 2 TIMES DAILY
Qty: 60 TABLET | Refills: 0
Start: 2025-05-02

## 2025-05-02 RX ADMIN — ALLOPURINOL 300 MG: 100 TABLET ORAL at 09:31

## 2025-05-02 RX ADMIN — FAMOTIDINE 20 MG: 20 TABLET, FILM COATED ORAL at 09:31

## 2025-05-02 RX ADMIN — CHOLECALCIFEROL TAB 125 MCG (5000 UNIT) 5000 UNITS: 125 TAB at 09:31

## 2025-05-02 RX ADMIN — ENOXAPARIN SODIUM 30 MG: 100 INJECTION SUBCUTANEOUS at 09:30

## 2025-05-02 RX ADMIN — Medication 1 CAPSULE: at 09:31

## 2025-05-02 NOTE — PROGRESS NOTES
Pt resting in chair at present time without complaints. Rounds performed, all needs met this shift. Bed locked and low, call light within reach. Will give report to oncoming day shift nurse.

## 2025-05-02 NOTE — CARE COORDINATION
Discharge note:  [facility] ambulance arranged for 1 pm to [facility] for STR, room 165-2, report [phone], extension [phone].  This plan is ok with Ms. Cho in room 608 and with her daughter Ms. Esha Bonilla via phone [phone].

## 2025-05-02 NOTE — WOUND CARE
BLE Unna boot compression wraps changed. Overall improvement w/ wounds, new epithelialization, and decreased slough.     Cleansed w/ soap/water, applied some antifungal cream to reddened areas, Opticell Ag/gauze to open areas, Unna boot compression wrap, rolled gauze, and coban.    LLE/lateral ankle 6x1x0.2cm, pink/red, slough, subcutaneous, epithelialization, small serosanguinous, no odor.        RLE/lateral ankle 4x1x0.2cm, pink/red, slough, subcutaneous, epithelialization, scant serosanguinous, no odor. Preventative pink silicone border foam placed on callous/dorsal foot bony prominence.

## 2025-05-02 NOTE — DISCHARGE SUMMARY
Hospitalist Discharge Summary   Admit Date:  2025  1:27 PM   DC Note date: 2025  Name:  Fanny Cho   Age:  79 y.o.  Sex:  female  :  1945   MRN:  [id number]   Room:  [facility]  PCP:  Thor Alonzo PA    Presenting Complaint: Skin Problem and Altered Mental Status     Initial Admission Diagnosis: Pressure sore on heel [L89.609]  Bilateral lower leg cellulitis [L03.116, L03.115]  Pressure injury of skin of heel, unspecified injury stage, unspecified laterality [L89.609]     Problem List for this Hospitalization (present on admission):    Principal Problem:    Pressure sore on heel  Active Problems:    Venous stasis dermatitis of both lower extremities    Gout    Mixed hyperlipidemia    Obesity, morbid (HCC)    Essential hypertension    Hypoalbuminemia    Right hip pain  Resolved Problems:    * No resolved hospital problems. *      Hospital Course:  79 y.o. female with medical history of  obesity BMI 38.7, HTN, dyslipidemia, gout, debility, venous stasis of B/L LE brought in from [facility] in view of bilateral foot pain and heel ulcers. Patient lives in assisted living at the [facility] and was brought in by the daughter with concern patient for confusion as well as discomfort in her bilateral heels.       In the emergency room, Bilateral x-ray of her feet show advanced degenerative changes in both feet, flexion deformities in the DIP of multiple left toes, soft tissue swelling predominantly on the left.  Laboratory workup was fairly unremarkable except for albumin of 2.3.      Patient was admitted with pressure sore on heel with history of venous stasis dermatitis to both lower extremities.  Lower extremity Doppler was without any signs of DVT. She has bilateral lower extremity chronic wounds.  Superficial cultures growing Pseudomonas, Enterococcus and MRSA.  She was started on broad-spectrum IV antibiotics.  ID was consulted and recommended no more than 5 days total of antibiotics and continue wound  levofloxacin >=8 ug/mL Resistant      meropenem 2 ug/mL Sensitive      piperacillin-tazobactam >=128 ug/mL Resistant      tobramycin <=1 ug/mL Sensitive                   [1]  E TEST                Susceptibility        Methicillin-Resistant Staphylococcus aureus      BACTERIAL SUSCEPTIBILITY PANEL GLENDY      linezolid 2 ug/mL Sensitive      oxacillin >=4 ug/mL Resistant      rifampin <=0.5 ug/mL Sensitive  [1]       tetracycline >=16 ug/mL Resistant  [2]       trimethoprim-sulfamethoxazole <=10 ug/mL Sensitive      vancomycin 1 ug/mL Sensitive                   [1]  Rifampin is not to be used for mono-therapy.     [2]  Organisms that are susceptible to tetracycline are also considered susceptible to doxycycline and minocycline. however,some organisms that are intermediate or resistant to tetracycline may be susceptible to doxycycline and minocycline.                Susceptibility        Enterococcus faecalis      BACTERIAL SUSCEPTIBILITY PANEL GLENDY      ampicillin <=2 ug/mL Sensitive      Penicillin G 4 ug/mL Sensitive      vancomycin 1 ug/mL Sensitive                           Culture, Blood 1 [[id number]] Collected: 04/24/25 1601    Order Status: Completed Specimen: Blood Updated: 04/29/25 1146     Special Requests --        RIGHT  Antecubital       Culture NO GROWTH 5 DAYS       Culture, Blood 1 [[id number]] Collected: 04/24/25 1422    Order Status: Completed Specimen: Blood Updated: 04/29/25 1146     Special Requests NO SPECIAL REQUESTS        Culture NO GROWTH 5 DAYS               All Labs from Last 24 Hrs:  No results found for this or any previous visit (from the past 24 hours).    No results for input(s): \"COVID19\" in the last 72 hours.    Recent Vital Data:  Patient Vitals for the past 24 hrs:   Temp Pulse Resp BP SpO2   05/02/25 0737 97.7 °F (36.5 °C) 58 16 (!) 152/65 96 %   05/02/25 0602 -- -- -- (!) 119/51 --   05/02/25 0349 98.1 °F (36.7 °C) 60 14 (!) 109/48 98 %   05/01/25 2004 97.9 °F (36.6 °C) 64 20

## 2025-05-02 NOTE — PROGRESS NOTES
TRANSFER - OUT REPORT:    Verbal report given to FOUZIA De Leon on Fanny Cho  being transferred to [facility] for routine progression of patient care       Report consisted of patient's Situation, Background, Assessment and   Recommendations(SBAR).     Information from the following report(s) Nurse Handoff Report was reviewed with the receiving nurse.           Lines:       Opportunity for questions and clarification was provided.      Patient transported with:  Tech

## 2025-05-05 ENCOUNTER — CARE COORDINATION (OUTPATIENT)
Dept: CARE COORDINATION | Facility: CLINIC | Age: 80
End: 2025-05-05

## 2025-05-05 NOTE — CARE COORDINATION
Transition of care outreach postponed for 7 days due to patient's discharge to [facility] for STR.

## 2025-05-12 ENCOUNTER — CARE COORDINATION (OUTPATIENT)
Dept: CARE COORDINATION | Facility: CLINIC | Age: 80
End: 2025-05-12

## 2025-05-12 NOTE — CARE COORDINATION
Care Transitions Post-Acute Facility Update Call    2025    Patient: Fanny Cho Patient : 1945   MRN: [id number]  Reason for Admission: Pressure Sore on the Heel  Discharge Date: 25 RARS: Readmission Risk Score: 11  Spoke with staff, states patient is currently still @ the rehab. Will continue to follow up for discharge information.       Care Transitions Post Acute Facility Update    Care Transitions Interventions      Post Acute Facility Update         Nursing       Rehab/Functional       SW/Discharge Planning

## 2025-05-13 NOTE — PROGRESS NOTES
Physician Progress Note      PATIENT:               KRISTA JACOBSON  CSN #:                  [id number]  :                       1945  ADMIT DATE:       2025 1:27 PM  DISCH DATE:        2025 1:00 PM  RESPONDING  PROVIDER #:        Gladis Odonnell DO          QUERY TEXT:    Pressure ulcer of  heel  is documented in the medical record Progress Notes    by Gladis Odonnell DO  Please specify the  stage:    The clinical indicators include:  Patient presents with bilateral foot pain and heel ulcers:  Venous stasis dermatitis    -\"Pressure sore on heel, Patient was admitted with pressure sore on heel with   history of venous stasis dermatitis to both lower extremities  ,Lower   extremity Doppler was without any signs of DVT. She has bilateral lower   extremity chronic wounds\"(PN  by Gladis Odonnell DO)    -\"For the past several days, patient has been having weakness, lethargy as   well as heel pain with worsening drainage from the heel\"(PN  by  Erin Cope MD)    -\"Patient wound culture growing heavy Pseudomonas, MRSA and   vancomycin-resistant Enterococcus faecalis\"( PN  by  Erin Cope MD)    -\"Extremities: Bilateral heel ulcers/pressure sores with surrounding erythema,   warm and tender to touch\"( H&P  by Maria Del Carmen Avilez MD)    -\"L lateral ankle 7x2x0.3cm, pink/red, granular, epithelialization, slough,   subcutaneous, small serosanguinous, no odor\"(wound care )  -\"R lateral ankle 4x1x0.2cm, pink/red, granular, slough, epithelialization,   scant serosanguinous, no odor.\"(wound care )    On EPIC  Ankle right lateral Wound assessment:Pink/red,slough,   subcutaneous   Ankle left lateral Wound assessment:Pink/red,slough, subcutaneous    -Wound care(by Moy Rosen RN),piperacillin-tazobactam (ZOSYN) 4,500 mg in   sodium chloride 0.9 % 100 mL(from EPIC ),vancomycin (VANCOCIN) 2500 mg in   sodium chloride 0.9 % 500 mL(from EPIC ) ,soap and water cleaning, daily   dressing,

## 2025-05-19 ENCOUNTER — CARE COORDINATION (OUTPATIENT)
Dept: CARE COORDINATION | Facility: CLINIC | Age: 80
End: 2025-05-19

## 2025-05-19 NOTE — CARE COORDINATION
Care Transitions Post-Acute Facility Update Call    2025    Patient: Fanny Cho Patient : 1945   MRN: [id number]  Reason for Admission: Pressure Sore on Heel  Discharge Date: 25 RARS: Readmission Risk Score: 11  Spoke with staff, states patient is currently still @ the facility. Will follow up for discharge information.        Care Transitions Post Acute Facility Update    Care Transitions Interventions      Post Acute Facility Update         Nursing       Rehab/Functional       SW/Discharge Planning

## 2025-05-27 ENCOUNTER — CARE COORDINATION (OUTPATIENT)
Dept: CARE COORDINATION | Facility: CLINIC | Age: 80
End: 2025-05-27

## 2025-05-27 NOTE — CARE COORDINATION
Care Transitions Post-Acute Facility Update Call    2025    Patient: Fanny Cho Patient : 1945   MRN: [id number]  Reason for Admission: Pressure Sore on Heel  Discharge Date: 25 RARS: Readmission Risk Score: 11  Spoke with staff , states patient is currently still @ the Rehab facility. Will continue to follow up for discharge information.       Care Transitions Post Acute Facility Update    Care Transitions Interventions      Post Acute Facility Update         Nursing       Rehab/Functional       SW/Discharge Planning

## 2025-06-02 ENCOUNTER — CARE COORDINATION (OUTPATIENT)
Dept: CARE COORDINATION | Facility: CLINIC | Age: 80
End: 2025-06-02

## 2025-06-02 NOTE — CARE COORDINATION
Care Transitions Post-Acute Facility Update Call    2025    Patient: Fanny Cho Patient : 1945   MRN: [id number]  Reason for Admission: Pressure Sore on Heels  Discharge Date: 25 RARS: Readmission Risk Score: 11  Unable to speak to staff for discharge information. Will attempt to contact facility next business day .       Care Transitions Post Acute Facility Update    Care Transitions Interventions      Post Acute Facility Update         Nursing       Rehab/Functional       SW/Discharge Planning

## 2025-06-03 ENCOUNTER — CARE COORDINATION (OUTPATIENT)
Dept: CARE COORDINATION | Facility: CLINIC | Age: 80
End: 2025-06-03

## 2025-06-03 NOTE — CARE COORDINATION
Care Transitions Post-Acute Facility Update Call    6/3/2025    Patient: Fanny Cho Patient : 1945   MRN: [id number]  Reason for Admission: Pressure sore on Heel  Discharge Date: 25 RARS: Readmission Risk Score: 11  Spoke with patient states patient is currently still at the facility. Will continue to follow up for discharge information.       Care Transitions Post Acute Facility Update    Care Transitions Interventions      Post Acute Facility Update         Nursing       Rehab/Functional       SW/Discharge Planning

## 2025-06-10 ENCOUNTER — CARE COORDINATION (OUTPATIENT)
Dept: CARE COORDINATION | Facility: CLINIC | Age: 80
End: 2025-06-10

## 2025-06-10 NOTE — CARE COORDINATION
Care Transitions Post-Acute Facility Update Call    6/10/2025    Patient: Fanny Cho Patient : 1945   MRN: [id number]  Reason for Admission: Pressure Sore on Heel  Discharge Date: 25 RARS: Readmission Risk Score: 11  Spoke with staff, states patient is currently still @ the facility. Patient has reached over 30 days at the Rehab Facility.       Care Transitions Post Acute Facility Update    Care Transitions Interventions      Post Acute Facility Update         Nursing       Rehab/Functional       SW/Discharge Planning

## 2025-06-13 ENCOUNTER — CARE COORDINATION (OUTPATIENT)
Dept: CARE COORDINATION | Facility: CLINIC | Age: 80
End: 2025-06-13

## (undated) DEVICE — SYR 3ML LL TIP 1/10ML GRAD --

## (undated) DEVICE — RAZOR PREP DBL EDGE STRL DISP --

## (undated) DEVICE — NDL PRT INJ NSAF BLNT 18GX1.5 --

## (undated) DEVICE — SPONGE: SPECIALTY PEANUT XR 100/CS: Brand: MEDICAL ACTION INDUSTRIES

## (undated) DEVICE — Device

## (undated) DEVICE — REM POLYHESIVE ADULT PATIENT RETURN ELECTRODE: Brand: VALLEYLAB

## (undated) DEVICE — JP 19FR SILICONE DRAIN: Brand: CARDINAL HEALTH

## (undated) DEVICE — CONNECTOR TBNG OD5-7MM O2 END DISP

## (undated) DEVICE — SYR 5ML 1/5 GRAD LL NSAF LF --

## (undated) DEVICE — KENDALL RADIOLUCENT FOAM MONITORING ELECTRODE RECTANGULAR SHAPE: Brand: KENDALL

## (undated) DEVICE — SHEET, T, LAPAROTOMY, STERILE: Brand: MEDLINE

## (undated) DEVICE — DRAPE TWL SURG 16X26IN BLU ORB04] ALLCARE INC]

## (undated) DEVICE — KIT INFUS PMP 270ML 2M/HR SOAK CATH L2.5IN N NARC ON-Q

## (undated) DEVICE — (D)STRIP SKN CLSR 0.5X4IN WHT --

## (undated) DEVICE — BUTTON SWITCH PENCIL BLADE ELECTRODE, HOLSTER: Brand: EDGE

## (undated) DEVICE — SUT PROL 1 30IN CTX BLU --

## (undated) DEVICE — SUTURE PERMAHAND SZ 2-0 L12X18IN NONABSORBABLE BLK SILK A185H

## (undated) DEVICE — (D)PREP SKN CHLRAPRP APPL 26ML -- CONVERT TO ITEM 371833

## (undated) DEVICE — INTENDED FOR TISSUE SEPARATION, AND OTHER PROCEDURES THAT REQUIRE A SHARP SURGICAL BLADE TO PUNCTURE OR CUT.: Brand: BARD-PARKER SAFETY BLADES SIZE 11, STERILE

## (undated) DEVICE — DRAIN WND PENRS RADPQ 0.25X12 --

## (undated) DEVICE — SOLUTION IV 1000ML 0.9% SOD CHL

## (undated) DEVICE — AMD ANTIMICROBIAL GAUZE SPONGES,12 PLY USP TYPE VII, 0.2% POLYHEXAMETHYLENE BIGUANIDE HCI (PHMB): Brand: CURITY

## (undated) DEVICE — SLIM BODY SKIN STAPLER: Brand: APPOSE ULC

## (undated) DEVICE — SUT ETHLN 3-0 18IN PS1 BLK --

## (undated) DEVICE — SYR LR LCK 1ML GRAD NSAF 30ML --

## (undated) DEVICE — DEVICE FIX OPN ABSRB STRP SECURESTRP

## (undated) DEVICE — KENDALL SCD EXPRESS SLEEVES, KNEE LENGTH, MEDIUM: Brand: KENDALL SCD

## (undated) DEVICE — MASTISOL ADHESIVE LIQ 2/3ML

## (undated) DEVICE — CANNULA NSL ORAL AD FOR CAPNOFLEX CO2 O2 AIRLFE

## (undated) DEVICE — SYR 50ML SLIP TIP NSAF LF STRL --